# Patient Record
Sex: MALE | Race: WHITE | Employment: OTHER | URBAN - METROPOLITAN AREA
[De-identification: names, ages, dates, MRNs, and addresses within clinical notes are randomized per-mention and may not be internally consistent; named-entity substitution may affect disease eponyms.]

---

## 2018-07-27 ENCOUNTER — HOSPITAL ENCOUNTER (EMERGENCY)
Age: 63
Discharge: HOME | End: 2018-07-27
Attending: EMERGENCY MEDICINE | Admitting: EMERGENCY MEDICINE
Payer: SELF-PAY

## 2018-07-27 ENCOUNTER — APPOINTMENT (OUTPATIENT)
Dept: GENERAL RADIOLOGY | Age: 63
End: 2018-07-27
Attending: EMERGENCY MEDICINE
Payer: SELF-PAY

## 2018-07-27 VITALS
DIASTOLIC BLOOD PRESSURE: 71 MMHG | OXYGEN SATURATION: 98 % | WEIGHT: 179 LBS | HEIGHT: 69 IN | BODY MASS INDEX: 26.51 KG/M2 | SYSTOLIC BLOOD PRESSURE: 126 MMHG | HEART RATE: 95 BPM | RESPIRATION RATE: 17 BRPM | TEMPERATURE: 98.6 F

## 2018-07-27 DIAGNOSIS — L03.115 CELLULITIS OF RIGHT LOWER EXTREMITY: Primary | ICD-10-CM

## 2018-07-27 DIAGNOSIS — L02.91 ABSCESS: ICD-10-CM

## 2018-07-27 LAB
ANION GAP SERPL CALC-SCNC: 9 MMOL/L (ref 3–18)
BASOPHILS # BLD: 0 K/UL (ref 0–0.1)
BASOPHILS NFR BLD: 0 % (ref 0–2)
BUN SERPL-MCNC: 16 MG/DL (ref 7–18)
BUN/CREAT SERPL: 16 (ref 12–20)
CALCIUM SERPL-MCNC: 8.9 MG/DL (ref 8.5–10.1)
CHLORIDE SERPL-SCNC: 106 MMOL/L (ref 100–108)
CO2 SERPL-SCNC: 25 MMOL/L (ref 21–32)
CREAT SERPL-MCNC: 1.02 MG/DL (ref 0.6–1.3)
DIFFERENTIAL METHOD BLD: ABNORMAL
EOSINOPHIL # BLD: 0 K/UL (ref 0–0.4)
EOSINOPHIL NFR BLD: 0 % (ref 0–5)
ERYTHROCYTE [DISTWIDTH] IN BLOOD BY AUTOMATED COUNT: 13.3 % (ref 11.6–14.5)
GLUCOSE SERPL-MCNC: 109 MG/DL (ref 74–99)
HCT VFR BLD AUTO: 39.5 % (ref 36–48)
HGB BLD-MCNC: 13.4 G/DL (ref 13–16)
LACTATE BLD-SCNC: 0.4 MMOL/L (ref 0.4–2)
LYMPHOCYTES # BLD: 1 K/UL (ref 0.9–3.6)
LYMPHOCYTES NFR BLD: 6 % (ref 21–52)
MCH RBC QN AUTO: 31.2 PG (ref 24–34)
MCHC RBC AUTO-ENTMCNC: 33.9 G/DL (ref 31–37)
MCV RBC AUTO: 92.1 FL (ref 74–97)
MONOCYTES # BLD: 1.3 K/UL (ref 0.05–1.2)
MONOCYTES NFR BLD: 8 % (ref 3–10)
NEUTS SEG # BLD: 13.6 K/UL (ref 1.8–8)
NEUTS SEG NFR BLD: 86 % (ref 40–73)
PLATELET # BLD AUTO: 240 K/UL (ref 135–420)
PMV BLD AUTO: 8.8 FL (ref 9.2–11.8)
POTASSIUM SERPL-SCNC: 3.9 MMOL/L (ref 3.5–5.5)
RBC # BLD AUTO: 4.29 M/UL (ref 4.7–5.5)
SODIUM SERPL-SCNC: 140 MMOL/L (ref 136–145)
WBC # BLD AUTO: 15.9 K/UL (ref 4.6–13.2)

## 2018-07-27 PROCEDURE — 90471 IMMUNIZATION ADMIN: CPT

## 2018-07-27 PROCEDURE — 74011250636 HC RX REV CODE- 250/636: Performed by: EMERGENCY MEDICINE

## 2018-07-27 PROCEDURE — 80048 BASIC METABOLIC PNL TOTAL CA: CPT | Performed by: PHYSICIAN ASSISTANT

## 2018-07-27 PROCEDURE — 96374 THER/PROPH/DIAG INJ IV PUSH: CPT

## 2018-07-27 PROCEDURE — 73564 X-RAY EXAM KNEE 4 OR MORE: CPT

## 2018-07-27 PROCEDURE — 85025 COMPLETE CBC W/AUTO DIFF WBC: CPT | Performed by: PHYSICIAN ASSISTANT

## 2018-07-27 PROCEDURE — 74011250636 HC RX REV CODE- 250/636: Performed by: PHYSICIAN ASSISTANT

## 2018-07-27 PROCEDURE — 75810000289 HC I&D ABSCESS SIMP/COMP/MULT

## 2018-07-27 PROCEDURE — 99283 EMERGENCY DEPT VISIT LOW MDM: CPT

## 2018-07-27 PROCEDURE — 90715 TDAP VACCINE 7 YRS/> IM: CPT | Performed by: PHYSICIAN ASSISTANT

## 2018-07-27 PROCEDURE — 83605 ASSAY OF LACTIC ACID: CPT

## 2018-07-27 RX ORDER — SULFAMETHOXAZOLE AND TRIMETHOPRIM 800; 160 MG/1; MG/1
1 TABLET ORAL 2 TIMES DAILY
Qty: 14 TAB | Refills: 0 | Status: SHIPPED | OUTPATIENT
Start: 2018-07-27 | End: 2018-08-06

## 2018-07-27 RX ORDER — CEPHALEXIN 500 MG/1
500 CAPSULE ORAL 4 TIMES DAILY
Qty: 28 CAP | Refills: 0 | Status: ON HOLD | OUTPATIENT
Start: 2018-07-27 | End: 2018-08-06

## 2018-07-27 RX ORDER — CEPHALEXIN 250 MG/1
500 CAPSULE ORAL
Status: DISCONTINUED | OUTPATIENT
Start: 2018-07-27 | End: 2018-07-27 | Stop reason: HOSPADM

## 2018-07-27 RX ORDER — SULFAMETHOXAZOLE AND TRIMETHOPRIM 800; 160 MG/1; MG/1
1 TABLET ORAL
Status: DISCONTINUED | OUTPATIENT
Start: 2018-07-27 | End: 2018-07-27 | Stop reason: HOSPADM

## 2018-07-27 RX ORDER — KETOROLAC TROMETHAMINE 30 MG/ML
15 INJECTION, SOLUTION INTRAMUSCULAR; INTRAVENOUS
Status: COMPLETED | OUTPATIENT
Start: 2018-07-27 | End: 2018-07-27

## 2018-07-27 RX ADMIN — KETOROLAC TROMETHAMINE 15 MG: 30 INJECTION, SOLUTION INTRAMUSCULAR at 19:16

## 2018-07-27 RX ADMIN — TETANUS TOXOID, REDUCED DIPHTHERIA TOXOID AND ACELLULAR PERTUSSIS VACCINE, ADSORBED 0.5 ML: 5; 2.5; 8; 8; 2.5 SUSPENSION INTRAMUSCULAR at 19:15

## 2018-07-27 NOTE — ED NOTES
Cellulitis right knee. HR slightly elevated. I performed a brief evaluation, including history and physical, of the patient here in triage and I have determined that pt will need further treatment and evaluation from the main side ER physician. I have placed initial orders to help in expediting patients care.      July 27, 2018 at 5:44 PM - Yumiko Verma PA-C

## 2018-07-27 NOTE — ED PROVIDER NOTES
EMERGENCY DEPARTMENT HISTORY AND PHYSICAL EXAM    7:01 PM      Date: 7/27/2018  Patient Name: Chance Perdue    History of Presenting Illness     Chief Complaint   Patient presents with    Knee Pain         History Provided By: Patient    Chief Complaint: Knee pain  Duration: 2-3 Days  Timing:  Progressive  Location: Right knee  Quality: Pressure  Severity: Moderate  Modifying Factors: N/A  Associated Symptoms: Swelling, erythema, abrasion, nausea, drainage      Additional History (Context): Chance Perdue is a 65 y.o. male with no PMHx who presents with progressive, moderate right knee pain described as pressure with associated swelling, erythema, abrasion, nausea, and drainage onset x 2-3 days. Pt states had a rusted nail impact his right knee a few days ago and believes all of the nail was removed. Since the nail entered his knee he has begun to have swelling of the right knee and right foot. This morning around 4 AM he pressed down on the knee when drainage came out. Has a known allergy to penicillin and amoxicillin. Denies fever. No further concerns or complaints at this time. PCP: None        Past History     Past Medical History:  History reviewed. No pertinent past medical history. Past Surgical History:  History reviewed. No pertinent surgical history. Family History:  History reviewed. No pertinent family history. Social History:  Social History   Substance Use Topics    Smoking status: Current Some Day Smoker    Smokeless tobacco: Never Used    Alcohol use None       Allergies: Allergies   Allergen Reactions    Amoxicillin Hives    Other Medication Other (comments)     No narcotics/hx addiction    Penicillins Hives         Review of Systems       Review of Systems   Constitutional: Negative for fever. Gastrointestinal: Positive for nausea. Musculoskeletal: Positive for joint swelling (right knee and right foot) and myalgias (right knee).    Skin: Positive for color change (erythema of the right knee) and wound (abrasion). Positive for drainage. All other systems reviewed and are negative. Physical Exam     Visit Vitals    /73 (BP 1 Location: Right arm, BP Patient Position: At rest)    Pulse 95    Temp 98.6 °F (37 °C)    Resp 17    Ht 5' 9\" (1.753 m)    Wt 81.2 kg (179 lb)    SpO2 98%    BMI 26.43 kg/m2       Physical Exam   Constitutional: He is oriented to person, place, and time. He appears well-developed and well-nourished. HENT:   Head: Normocephalic and atraumatic. Neck: Neck supple. No JVD present. Abdominal: Soft. He exhibits no distension. There is no tenderness. There is no rebound and no guarding. Musculoskeletal:        Legs:  RLE 2+ edema  No LLE edema  Mild erythema R knee inferior to patella as drawn  Central area with white and no active drainage at this time  Full ROM R knee  Gross sensation intact BL LE  DP 2+    Neurological: He is alert and oriented to person, place, and time. Skin: Skin is warm and dry. Psychiatric: Judgment normal.         Diagnostic Study Results     Labs -  Recent Results (from the past 12 hour(s))   CBC WITH AUTOMATED DIFF    Collection Time: 07/27/18  7:10 PM   Result Value Ref Range    WBC 15.9 (H) 4.6 - 13.2 K/uL    RBC 4.29 (L) 4.70 - 5.50 M/uL    HGB 13.4 13.0 - 16.0 g/dL    HCT 39.5 36.0 - 48.0 %    MCV 92.1 74.0 - 97.0 FL    MCH 31.2 24.0 - 34.0 PG    MCHC 33.9 31.0 - 37.0 g/dL    RDW 13.3 11.6 - 14.5 %    PLATELET 530 338 - 007 K/uL    MPV 8.8 (L) 9.2 - 11.8 FL    NEUTROPHILS 86 (H) 40 - 73 %    LYMPHOCYTES 6 (L) 21 - 52 %    MONOCYTES 8 3 - 10 %    EOSINOPHILS 0 0 - 5 %    BASOPHILS 0 0 - 2 %    ABS. NEUTROPHILS 13.6 (H) 1.8 - 8.0 K/UL    ABS. LYMPHOCYTES 1.0 0.9 - 3.6 K/UL    ABS. MONOCYTES 1.3 (H) 0.05 - 1.2 K/UL    ABS. EOSINOPHILS 0.0 0.0 - 0.4 K/UL    ABS.  BASOPHILS 0.0 0.0 - 0.1 K/UL    DF AUTOMATED     METABOLIC PANEL, BASIC    Collection Time: 07/27/18  7:10 PM   Result Value Ref Range    Sodium 140 136 - 145 mmol/L    Potassium 3.9 3.5 - 5.5 mmol/L    Chloride 106 100 - 108 mmol/L    CO2 25 21 - 32 mmol/L    Anion gap 9 3.0 - 18 mmol/L    Glucose 109 (H) 74 - 99 mg/dL    BUN 16 7.0 - 18 MG/DL    Creatinine 1.02 0.6 - 1.3 MG/DL    BUN/Creatinine ratio 16 12 - 20      GFR est AA >60 >60 ml/min/1.73m2    GFR est non-AA >60 >60 ml/min/1.73m2    Calcium 8.9 8.5 - 10.1 MG/DL   POC LACTIC ACID    Collection Time: 07/27/18  7:18 PM   Result Value Ref Range    Lactic Acid (POC) 0.4 0.4 - 2.0 mmol/L       Radiologic Studies -   XR KNEE RT MIN 4 V    (Results Pending)     No acute process, mild joint effusion    Medical Decision Making   I am the first provider for this patient. I reviewed the vital signs, available nursing notes, past medical history, past surgical history, family history and social history. Vital Signs-Reviewed the patient's vital signs. Pulse Oximetry Analysis -  98% on room air, normal.       Records Reviewed: Nursing Notes (Time of Review: 7:01 PM)    ED Course: Progress Notes, Reevaluation, and Consults:    Provider Notes (Medical Decision Making): 64 y/o male presents with R knee redness and swelling. S/p nail to knee, concern for cellulitis, abscess  Doubt septic joint as ROM intact and pucture site inferior to joint  Will xr to eval for fb  Screen for sepsis. Discussed concern with pt and advised admission, pt declines, understands risk of worsening infection. bedisde US showing possible small fluid collection so will attempt I&D  Cover with abx. Discussed wound care and return precautions.      Procedures:   I&D Abcess Simple  Date/Time: 7/27/2018 8:15 PM  Performed by: Stalin Lora  Authorized by: Stalin Lora     Consent:     Consent obtained:  Verbal    Consent given by:  Patient    Risks discussed:  Bleeding, incomplete drainage and infection    Alternatives discussed:  No treatment  Location:     Type:  Abscess    Size:  1cm    Location:  Lower extremity    Lower extremity location:  Knee    Knee location:  R knee  Pre-procedure details:     Skin preparation:  Chloraprep  Anesthesia (see MAR for exact dosages): Anesthesia method:  Local infiltration    Local anesthetic:  Lidocaine 1% w/o epi (2)  Procedure type:     Complexity:  Simple  Procedure details:     Incision types:  Stab incision    Incision depth:  Subcutaneous    Scalpel blade:  11    Drainage:  Bloody    Drainage amount:  Scant    Wound treatment:  Wound left open    Packing materials:  None  Post-procedure details:     Patient tolerance of procedure: Tolerated well, no immediate complications            Diagnosis     Clinical Impression:   1. Cellulitis of right lower extremity    2. Abscess        Disposition: Discharged. Attestations:  Romel [address] [place] [address] acting as a scribe for and in the presence of Starla Dutta DO      July 27, 2018 at 7:01 PM       Provider Attestation:      I personally performed the services described in the documentation, reviewed the documentation, as recorded by the scribe in my presence, and it accurately and completely records my words and actions.  July 27, 2018 at DO [place]    _______________________________

## 2018-07-28 NOTE — DISCHARGE INSTRUCTIONS
Cellulitis: Care Instructions  Your Care Instructions    Cellulitis is a skin infection caused by bacteria, most often strep or staph. It often occurs after a break in the skin from a scrape, cut, bite, or puncture, or after a rash. Cellulitis may be treated without doing tests to find out what caused it. But your doctor may do tests, if needed, to look for a specific bacteria, like methicillin-resistant Staphylococcus aureus (MRSA). The doctor has checked you carefully, but problems can develop later. If you notice any problems or new symptoms, get medical treatment right away. Follow-up care is a key part of your treatment and safety. Be sure to make and go to all appointments, and call your doctor if you are having problems. It's also a good idea to know your test results and keep a list of the medicines you take. How can you care for yourself at home? · Take your antibiotics as directed. Do not stop taking them just because you feel better. You need to take the full course of antibiotics. · Prop up the infected area on pillows to reduce pain and swelling. Try to keep the area above the level of your heart as often as you can. · If your doctor told you how to care for your wound, follow your doctor's instructions. If you did not get instructions, follow this general advice:  ¨ Wash the wound with clean water 2 times a day. Don't use hydrogen peroxide or alcohol, which can slow healing. ¨ You may cover the wound with a thin layer of petroleum jelly, such as Vaseline, and a nonstick bandage. ¨ Apply more petroleum jelly and replace the bandage as needed. · Be safe with medicines. Take pain medicines exactly as directed. ¨ If the doctor gave you a prescription medicine for pain, take it as prescribed. ¨ If you are not taking a prescription pain medicine, ask your doctor if you can take an over-the-counter medicine.   To prevent cellulitis in the future  · Try to prevent cuts, scrapes, or other injuries to your skin. Cellulitis most often occurs where there is a break in the skin. · If you get a scrape, cut, mild burn, or bite, wash the wound with clean water as soon as you can to help avoid infection. Don't use hydrogen peroxide or alcohol, which can slow healing. · If you have swelling in your legs (edema), support stockings and good skin care may help prevent leg sores and cellulitis. · Take care of your feet, especially if you have diabetes or other conditions that increase the risk of infection. Wear shoes and socks. Do not go barefoot. If you have athlete's foot or other skin problems on your feet, talk to your doctor about how to treat them. When should you call for help? Call your doctor now or seek immediate medical care if:    · You have signs that your infection is getting worse, such as:  ¨ Increased pain, swelling, warmth, or redness. ¨ Red streaks leading from the area. ¨ Pus draining from the area. ¨ A fever.     · You get a rash.    Watch closely for changes in your health, and be sure to contact your doctor if:    · You do not get better as expected. Where can you learn more? Go to http://tiffanie-elliot.info/. Lee Lacey in the search box to learn more about \"Cellulitis: Care Instructions. \"  Current as of: May 10, 2017  Content Version: 11.7  © 7454-7971 [employer]. Care instructions adapted under license by [employer] (which disclaims liability or warranty for this information). If you have questions about a medical condition or this instruction, always ask your healthcare professional. [place] [address] any warranty or liability for your use of this information.

## 2018-07-29 ENCOUNTER — HOSPITAL ENCOUNTER (INPATIENT)
Age: 63
LOS: 8 days | Discharge: HOME HEALTH | DRG: 464 | End: 2018-08-06
Attending: EMERGENCY MEDICINE | Admitting: INTERNAL MEDICINE
Payer: SELF-PAY

## 2018-07-29 DIAGNOSIS — L03.115 CELLULITIS OF RIGHT LEG: Primary | ICD-10-CM

## 2018-07-29 DIAGNOSIS — Z78.9 FAILURE OF OUTPATIENT TREATMENT: ICD-10-CM

## 2018-07-29 PROBLEM — T14.8XXA INFECTED WOUND: Status: ACTIVE | Noted: 2018-07-29

## 2018-07-29 PROBLEM — L08.9 INFECTED WOUND: Status: ACTIVE | Noted: 2018-07-29

## 2018-07-29 PROBLEM — Z72.0 TOBACCO ABUSE: Status: ACTIVE | Noted: 2018-07-29

## 2018-07-29 LAB
ANION GAP SERPL CALC-SCNC: 7 MMOL/L (ref 3–18)
ANION GAP SERPL CALC-SCNC: 9 MMOL/L (ref 3–18)
BASOPHILS # BLD: 0 K/UL (ref 0–0.1)
BASOPHILS # BLD: 0 K/UL (ref 0–0.1)
BASOPHILS NFR BLD: 0 % (ref 0–2)
BASOPHILS NFR BLD: 0 % (ref 0–2)
BUN SERPL-MCNC: 15 MG/DL (ref 7–18)
BUN SERPL-MCNC: 16 MG/DL (ref 7–18)
BUN/CREAT SERPL: 14 (ref 12–20)
BUN/CREAT SERPL: 14 (ref 12–20)
CALCIUM SERPL-MCNC: 8.1 MG/DL (ref 8.5–10.1)
CALCIUM SERPL-MCNC: 8.4 MG/DL (ref 8.5–10.1)
CHLORIDE SERPL-SCNC: 105 MMOL/L (ref 100–108)
CHLORIDE SERPL-SCNC: 105 MMOL/L (ref 100–108)
CO2 SERPL-SCNC: 25 MMOL/L (ref 21–32)
CO2 SERPL-SCNC: 28 MMOL/L (ref 21–32)
CREAT SERPL-MCNC: 1.07 MG/DL (ref 0.6–1.3)
CREAT SERPL-MCNC: 1.14 MG/DL (ref 0.6–1.3)
DIFFERENTIAL METHOD BLD: ABNORMAL
DIFFERENTIAL METHOD BLD: ABNORMAL
EOSINOPHIL # BLD: 0 K/UL (ref 0–0.4)
EOSINOPHIL # BLD: 0.1 K/UL (ref 0–0.4)
EOSINOPHIL NFR BLD: 0 % (ref 0–5)
EOSINOPHIL NFR BLD: 1 % (ref 0–5)
ERYTHROCYTE [DISTWIDTH] IN BLOOD BY AUTOMATED COUNT: 13.3 % (ref 11.6–14.5)
ERYTHROCYTE [DISTWIDTH] IN BLOOD BY AUTOMATED COUNT: 13.5 % (ref 11.6–14.5)
GLUCOSE SERPL-MCNC: 114 MG/DL (ref 74–99)
GLUCOSE SERPL-MCNC: 97 MG/DL (ref 74–99)
HCT VFR BLD AUTO: 35.9 % (ref 36–48)
HCT VFR BLD AUTO: 38.5 % (ref 36–48)
HGB BLD-MCNC: 12.1 G/DL (ref 13–16)
HGB BLD-MCNC: 13.2 G/DL (ref 13–16)
LACTATE BLD-SCNC: 0.9 MMOL/L (ref 0.4–2)
LACTATE BLD-SCNC: 1.2 MMOL/L (ref 0.4–2)
LACTATE SERPL-SCNC: 1.1 MMOL/L (ref 0.4–2)
LYMPHOCYTES # BLD: 0.8 K/UL (ref 0.9–3.6)
LYMPHOCYTES # BLD: 0.9 K/UL (ref 0.9–3.6)
LYMPHOCYTES NFR BLD: 7 % (ref 21–52)
LYMPHOCYTES NFR BLD: 7 % (ref 21–52)
MCH RBC QN AUTO: 31.3 PG (ref 24–34)
MCH RBC QN AUTO: 31.4 PG (ref 24–34)
MCHC RBC AUTO-ENTMCNC: 33.7 G/DL (ref 31–37)
MCHC RBC AUTO-ENTMCNC: 34.3 G/DL (ref 31–37)
MCV RBC AUTO: 91.4 FL (ref 74–97)
MCV RBC AUTO: 92.8 FL (ref 74–97)
MONOCYTES # BLD: 0.8 K/UL (ref 0.05–1.2)
MONOCYTES # BLD: 1 K/UL (ref 0.05–1.2)
MONOCYTES NFR BLD: 10 % (ref 3–10)
MONOCYTES NFR BLD: 6 % (ref 3–10)
NEUTS SEG # BLD: 11.1 K/UL (ref 1.8–8)
NEUTS SEG # BLD: 8.6 K/UL (ref 1.8–8)
NEUTS SEG NFR BLD: 82 % (ref 40–73)
NEUTS SEG NFR BLD: 87 % (ref 40–73)
PLATELET # BLD AUTO: 204 K/UL (ref 135–420)
PLATELET # BLD AUTO: 229 K/UL (ref 135–420)
PMV BLD AUTO: 8.8 FL (ref 9.2–11.8)
PMV BLD AUTO: 8.8 FL (ref 9.2–11.8)
POTASSIUM SERPL-SCNC: 3.8 MMOL/L (ref 3.5–5.5)
POTASSIUM SERPL-SCNC: 3.9 MMOL/L (ref 3.5–5.5)
RBC # BLD AUTO: 3.87 M/UL (ref 4.7–5.5)
RBC # BLD AUTO: 4.21 M/UL (ref 4.7–5.5)
SODIUM SERPL-SCNC: 139 MMOL/L (ref 136–145)
SODIUM SERPL-SCNC: 140 MMOL/L (ref 136–145)
WBC # BLD AUTO: 10.5 K/UL (ref 4.6–13.2)
WBC # BLD AUTO: 12.8 K/UL (ref 4.6–13.2)

## 2018-07-29 PROCEDURE — 99284 EMERGENCY DEPT VISIT MOD MDM: CPT

## 2018-07-29 PROCEDURE — 80048 BASIC METABOLIC PNL TOTAL CA: CPT | Performed by: PHYSICIAN ASSISTANT

## 2018-07-29 PROCEDURE — 74011250637 HC RX REV CODE- 250/637: Performed by: INTERNAL MEDICINE

## 2018-07-29 PROCEDURE — 74011250636 HC RX REV CODE- 250/636: Performed by: INTERNAL MEDICINE

## 2018-07-29 PROCEDURE — 85025 COMPLETE CBC W/AUTO DIFF WBC: CPT | Performed by: PHYSICIAN ASSISTANT

## 2018-07-29 PROCEDURE — 87070 CULTURE OTHR SPECIMN AEROBIC: CPT | Performed by: INTERNAL MEDICINE

## 2018-07-29 PROCEDURE — 77030011256 HC DRSG MEPILEX <16IN NO BORD MOLN -A

## 2018-07-29 PROCEDURE — 96365 THER/PROPH/DIAG IV INF INIT: CPT

## 2018-07-29 PROCEDURE — 83605 ASSAY OF LACTIC ACID: CPT

## 2018-07-29 PROCEDURE — 74011250636 HC RX REV CODE- 250/636: Performed by: EMERGENCY MEDICINE

## 2018-07-29 PROCEDURE — 77030019604 HC DRSG WND CA ALG S&N -A

## 2018-07-29 PROCEDURE — 65270000029 HC RM PRIVATE

## 2018-07-29 PROCEDURE — 87040 BLOOD CULTURE FOR BACTERIA: CPT | Performed by: HOSPITALIST

## 2018-07-29 PROCEDURE — 36415 COLL VENOUS BLD VENIPUNCTURE: CPT | Performed by: INTERNAL MEDICINE

## 2018-07-29 PROCEDURE — 80048 BASIC METABOLIC PNL TOTAL CA: CPT | Performed by: INTERNAL MEDICINE

## 2018-07-29 PROCEDURE — 87186 SC STD MICRODIL/AGAR DIL: CPT | Performed by: INTERNAL MEDICINE

## 2018-07-29 PROCEDURE — 87077 CULTURE AEROBIC IDENTIFY: CPT | Performed by: INTERNAL MEDICINE

## 2018-07-29 PROCEDURE — 83605 ASSAY OF LACTIC ACID: CPT | Performed by: INTERNAL MEDICINE

## 2018-07-29 RX ORDER — LEVOFLOXACIN 5 MG/ML
750 INJECTION, SOLUTION INTRAVENOUS EVERY 24 HOURS
Status: DISCONTINUED | OUTPATIENT
Start: 2018-07-29 | End: 2018-07-31

## 2018-07-29 RX ORDER — VANCOMYCIN 2 GRAM/500 ML IN 0.9 % SODIUM CHLORIDE INTRAVENOUS
2000 ONCE
Status: COMPLETED | OUTPATIENT
Start: 2018-07-29 | End: 2018-07-29

## 2018-07-29 RX ORDER — ACETAMINOPHEN 325 MG/1
650 TABLET ORAL
Status: DISCONTINUED | OUTPATIENT
Start: 2018-07-29 | End: 2018-07-30

## 2018-07-29 RX ORDER — METRONIDAZOLE 500 MG/100ML
500 INJECTION, SOLUTION INTRAVENOUS EVERY 8 HOURS
Status: DISCONTINUED | OUTPATIENT
Start: 2018-07-29 | End: 2018-07-29

## 2018-07-29 RX ORDER — SODIUM CHLORIDE 9 MG/ML
75 INJECTION, SOLUTION INTRAVENOUS CONTINUOUS
Status: DISCONTINUED | OUTPATIENT
Start: 2018-07-29 | End: 2018-07-31

## 2018-07-29 RX ORDER — SODIUM CHLORIDE 0.9 % (FLUSH) 0.9 %
5-10 SYRINGE (ML) INJECTION AS NEEDED
Status: DISCONTINUED | OUTPATIENT
Start: 2018-07-29 | End: 2018-08-06 | Stop reason: HOSPADM

## 2018-07-29 RX ORDER — ENOXAPARIN SODIUM 100 MG/ML
40 INJECTION SUBCUTANEOUS EVERY 24 HOURS
Status: DISCONTINUED | OUTPATIENT
Start: 2018-07-29 | End: 2018-08-06 | Stop reason: HOSPADM

## 2018-07-29 RX ORDER — SODIUM CHLORIDE 0.9 % (FLUSH) 0.9 %
5-10 SYRINGE (ML) INJECTION EVERY 8 HOURS
Status: DISCONTINUED | OUTPATIENT
Start: 2018-07-29 | End: 2018-08-06 | Stop reason: HOSPADM

## 2018-07-29 RX ADMIN — SODIUM CHLORIDE 75 ML/HR: 900 INJECTION, SOLUTION INTRAVENOUS at 19:23

## 2018-07-29 RX ADMIN — VANCOMYCIN HYDROCHLORIDE 2000 MG: 10 INJECTION, POWDER, LYOPHILIZED, FOR SOLUTION INTRAVENOUS at 16:56

## 2018-07-29 RX ADMIN — ENOXAPARIN SODIUM 40 MG: 100 INJECTION SUBCUTANEOUS at 19:23

## 2018-07-29 RX ADMIN — ACETAMINOPHEN 650 MG: 325 TABLET, FILM COATED ORAL at 19:22

## 2018-07-29 RX ADMIN — LEVOFLOXACIN 750 MG: 5 INJECTION, SOLUTION INTRAVENOUS at 15:21

## 2018-07-29 NOTE — IP AVS SNAPSHOT
[address] Patient: Papito Shah MRN: [id number] KQS:5/25/7805 About your hospitalization You were admitted on:  July 29, 2018 You last received care in the:  [address] [place] [address],[address] You were discharged on:  August 6, 2018 Why you were hospitalized Your primary diagnosis was:  Cellulitis Of Right Leg Your diagnoses also included:  Infected Wound, Tobacco Abuse Follow-up Information Follow up With Details Comments Contact Brandie Miller MD On 8/6/2018 11:45am [address] [phone] 
[phone] Your Scheduled Appointments Tuesday August 07, 2018 To Be Determined START OF CARE with Magdiel Lugo RN  
[facility] CARE SCHEDULING/INTAKE ([facility]/ [facility]PICE) [address] [place] [address] SCHEDULING/INTAKE ([facility]/ HOSPICE) Tuesday August 07, 2018  3:00 PM EDT INFUSION 30 with [facility] INFUSION NURSE 3  
[facility] OP INFUSION [facility] ([address]) [facility] [phone]  
[phone] Wednesday August 08, 2018  1:00 PM EDT INFUSION 30 with [facility] FAST TRACK NURSE [facility] OP INFUSION [facility] ([address]) [facility] [phone]  
[phone] Discharge Orders None A check val indicates which time of day the medication should be taken. My Medications START taking these medications Instructions Each Dose to Equal  
 Morning Noon Evening Bedtime  
 oxyCODONE-acetaminophen  mg per tablet Commonly known as:  PERCOCET 10 Your last dose was: Your next dose is: Take 2 Tabs by mouth every six (6) hours as needed. Max Daily Amount: 8 Tabs. 2 Tab CONTINUE taking these medications  Instructions Each Dose to Equal  
 Morning Noon Evening Bedtime  
 cephALEXin 500 mg capsule Commonly known as:  Christi Hall Your last dose was: Your next dose is: Take 1 Cap by mouth four (4) times daily for 9 days. 500 mg  
    
   
   
   
  
  
STOP taking these medications   
 trimethoprim-sulfamethoxazole 160-800 mg per tablet Commonly known as:  BACTRIM DS, SEPTRA DS Where to Get Your Medications Information on where to get these meds will be given to you by the nurse or doctor. ! Ask your nurse or doctor about these medications  
  cephALEXin 500 mg capsule  
 oxyCODONE-acetaminophen  mg per tablet Opioid Education Prescription Opioids: What You Need to Know: 
 
 
After general anesthesia or intravenous sedation, for 24 hours or while taking prescription Narcotics: · Limit your activities · Do not drive and operate hazardous machinery · Do not make important personal or business decisions · Do  not drink alcoholic beverages · If you have not urinated within 8 hours after discharge, please contact your surgeon on call. Report the following to your surgeon: 
· Excessive pain, swelling, redness or odor of or around the surgical area · Temperature over 100.5 · Nausea and vomiting lasting longer than 4 hours or if unable to take medications · Any signs of decreased circulation or nerve impairment to extremity: change in color, persistent  numbness, tingling, coldness or increase pain · Any questions What to do at Home: 
Recommended activity: Activity as tolerated,  
 
 *  Please give a list of your current medications to your Primary Care Provider. *  Please update this list whenever your medications are discontinued, doses are 
    changed, or new medications (including over-the-counter products) are added. *  Please carry medication information at all times in case of emergency situations. These are general instructions for a healthy lifestyle: No smoking/ No tobacco products/ Avoid exposure to second hand smoke Surgeon General's Warning:  Quitting smoking now greatly reduces serious risk to your health. Obesity, smoking, and sedentary lifestyle greatly increases your risk for illness A healthy diet, regular physical exercise & weight monitoring are important for maintaining a healthy lifestyle You may be retaining fluid if you have a history of heart failure or if you experience any of the following symptoms:  Weight gain of 3 pounds or more overnight or 5 pounds in a week, increased swelling in our hands or feet or shortness of breath while lying flat in bed. Please call your doctor as soon as you notice any of these symptoms; do not wait until your next office visit. Recognize signs and symptoms of STROKE: 
 
F-face looks uneven A-arms unable to move or move unevenly S-speech slurred or non-existent T-time-call 911 as soon as signs and symptoms begin-DO NOT go Back to bed or wait to see if you get better-TIME IS BRAIN. Warning Signs of HEART ATTACK Call 911 if you have these symptoms: 
? Chest discomfort. Most heart attacks involve discomfort in the center of the chest that lasts more than a few minutes, or that goes away and comes back. It can feel like uncomfortable pressure, squeezing, fullness, or pain. ? Discomfort in other areas of the upper body. Symptoms can include pain or discomfort in one or both arms, the back, neck, jaw, or stomach. ? Shortness of breath with or without chest discomfort. ? Other signs may include breaking out in a cold sweat, nausea, or lightheadedness. Don't wait more than five minutes to call [address]! Fast action can save your life. Calling 911 is almost always the fastest way to get lifesaving treatment. Emergency Medical Services staff can begin treatment when they arrive  up to an hour sooner than if someone gets to the hospital by car. The discharge information has been reviewed with the patient. The patient verbalized understanding. Discharge medications reviewed with the patient and appropriate educational materials and side effects teaching were provided. ___________________________________________________________________________________________________________________________________ [employer] Coach[employer] Announcement We are excited to announce that we are making your provider's discharge notes available to you in Printed Piece. You will see these notes when they are completed and signed by the physician that discharged you from your recent hospital stay. If you have any questions or concerns about any information you see in Printed Piece, please call the Health Information Department where you were seen or reach out to your Primary Care Provider for more information about your plan of care. Introducing [facility]! [employer] introduces Printed Piece patient portal. Now you can access parts of your medical record, email your doctor's office, and request medication refills online. 1. In your internet browser, go to [email or web address] 2. Click on the First Time User? Click Here link in the Sign In box. You will see the New Member Sign Up page. 3. Enter your Printed Piece Access Code exactly as it appears below. You will not need to use this code after youve completed the sign-up process. If you do not sign up before the expiration date, you must request a new code. · Printed Piece Access Code: [email or web address] Expires: 10/25/2018  5:39 PM 
 
4. Enter the last four digits of your Social Security Number (xxxx) and Date of Birth (mm/dd/yyyy) as indicated and click Submit. You will be taken to the next sign-up page. 5. Create a Awesome.met ID. This will be your Service Seeking login ID and cannot be changed, so think of one that is secure and easy to remember. 6. Create a Service Seeking password. You can change your password at any time. 7. Enter your Password Reset Question and Answer. This can be used at a later time if you forget your password. 8. Enter your e-mail address. You will receive e-mail notification when new information is available in [address] 9. Click Sign Up. You can now view and download portions of your medical record. 10. Click the Download Summary menu link to download a portable copy of your medical information. If you have questions, please visit the Frequently Asked Questions section of the Service Seeking website. Remember, Service Seeking is NOT to be used for urgent needs. For medical emergencies, dial 911. Now available from your iPhone and Android! Introducing Anders Deshpande As a [employer] patient, I wanted to make you aware of our electronic visit tool called Anders Deshpande. [employer] 24/7 allows you to connect within minutes with a medical provider 24 hours a day, seven days a week via a mobile device or tablet or logging into a secure website from your computer. You can access Anders Anthonyprimofin from anywhere in the [place]. A virtual visit might be right for you when you have a simple condition and feel like you just dont want to get out of bed, or cant get away from work for an appointment, when your regular [employer] provider is not available (evenings, weekends or holidays), or when youre out of town and need minor care.   Electronic visits cost only $49 and if the Anders Charlee provider determines a prescription is needed to treat your condition, one can be electronically transmitted to a nearby pharmacy*. Please take a moment to enroll today if you have not already done so. The enrollment process is free and takes just a few minutes. To enroll, please download the Tittat 24/7 sandy to your tablet or phone, or visit [email or web address] to enroll on your computer. And, as an [address] [place] [address] patient with a Idibon account, the results of your visits will be scanned into your electronic medical record and your primary care provider will be able to view the scanned results. We urge you to continue to see your regular [employer] provider for your ongoing medical care. And while your primary care provider may not be the one available when you seek a [employer]â„¢ virtual visit, the peace of mind you get from getting a real diagnosis real time can be priceless. For more information on [employer]â„¢, view our Frequently Asked Questions (FAQs) at [email or web address]. Sincerely, 
 
Figueroa Reddy MD 
Chief Medical Officer [facility] Sera Bojorquez *:  certain medications cannot be prescribed via [employer]â„¢ Unresulted Labs-Please follow up with your PCP about these lab tests Order Current Status CK In process Providers Seen During Your Hospitalization Provider Specialty Primary office phone Nini Saha MD Emergency Medicine [phone] Nick Sanchez MD Internal Medicine [phone] Donnie Jones MD Family Practice [phone] Sosa Carl MD Internal Medicine [phone] Your Primary Care Physician (PCP) Primary Care Physician Office Phone Office Fax [phone] [address] [phone] You are allergic to the following Allergen Reactions Amoxicillin Hives Other Medication Other (comments) No narcotics/hx addiction Penicillins Hives Recent Documentation Height Weight BMI Smoking Status 1.753 m 81.2 kg 26.43 kg/m2 Current Some Day Smoker Emergency Contacts Name Discharge Info Relation Home Work Mobile Shashank Salmeron  Other Relative [6] [phone] Elizabeth Simon CAREGIVER [3] Friend [5] [phone] Patient Belongings The following personal items are in your possession at time of discharge: 
  Dental Appliances: None  Visual Aid: Glasses, With patient      Home Medications: None   Jewelry: None  Clothing: At bedside    Other Valuables: Cell Phone  Personal Items Sent to Safe: none Discharge Instructions Attachments/References CELLULITIS (ENGLISH) WOUND: VAC (VACUUM-ASSISTED CLOSURE) (ENGLISH) Patient Handouts Cellulitis: Care Instructions Your Care Instructions Cellulitis is a skin infection caused by bacteria, most often strep or staph. It often occurs after a break in the skin from a scrape, cut, bite, or puncture, or after a rash. Cellulitis may be treated without doing tests to find out what caused it. But your doctor may do tests, if needed, to look for a specific bacteria, like methicillin-resistant Staphylococcus aureus (MRSA). The doctor has checked you carefully, but problems can develop later. If you notice any problems or new symptoms, get medical treatment right away. Follow-up care is a key part of your treatment and safety. Be sure to make and go to all appointments, and call your doctor if you are having problems. It's also a good idea to know your test results and keep a list of the medicines you take. How can you care for yourself at home? · Take your antibiotics as directed. Do not stop taking them just because you feel better. You need to take the full course of antibiotics. · Prop up the infected area on pillows to reduce pain and swelling. Try to keep the area above the level of your heart as often as you can. · If your doctor told you how to care for your wound, follow your doctor's instructions. If you did not get instructions, follow this general advice: ¨ Wash the wound with clean water 2 times a day. Don't use hydrogen peroxide or alcohol, which can slow healing. ¨ You may cover the wound with a thin layer of petroleum jelly, such as Vaseline, and a nonstick bandage. ¨ Apply more petroleum jelly and replace the bandage as needed. · Be safe with medicines. Take pain medicines exactly as directed. ¨ If the doctor gave you a prescription medicine for pain, take it as prescribed. ¨ If you are not taking a prescription pain medicine, ask your doctor if you can take an over-the-counter medicine. To prevent cellulitis in the future · Try to prevent cuts, scrapes, or other injuries to your skin. Cellulitis most often occurs where there is a break in the skin. · If you get a scrape, cut, mild burn, or bite, wash the wound with clean water as soon as you can to help avoid infection. Don't use hydrogen peroxide or alcohol, which can slow healing. · If you have swelling in your legs (edema), support stockings and good skin care may help prevent leg sores and cellulitis. · Take care of your feet, especially if you have diabetes or other conditions that increase the risk of infection. Wear shoes and socks. Do not go barefoot. If you have athlete's foot or other skin problems on your feet, talk to your doctor about how to treat them. When should you call for help? Call your doctor now or seek immediate medical care if: 
  · You have signs that your infection is getting worse, such as: 
¨ Increased pain, swelling, warmth, or redness. ¨ Red streaks leading from the area. ¨ Pus draining from the area. ¨ A fever.  
  · You get a rash.  
 Watch closely for changes in your health, and be sure to contact your doctor if: 
  · You do not get better as expected. Where can you learn more? Go to http://tiffanie-elliot.info/. Zeeshan Hamilton in the search box to learn more about \"Cellulitis: Care Instructions. \" Current as of: May 10, 2017 Content Version: 11.7 © 7251-3308 [employer]. Care instructions adapted under license by [employer] (which disclaims liability or warranty for this information). If you have questions about a medical condition or this instruction, always ask your healthcare professional. [address] any warranty or liability for your use of this information. Vacuum-Assisted Closure for Wound Healing: Care Instructions Your Care Instructions When you have a wound that is hard to close your doctor may treat it with vacuum-assisted closure (VAC). VAC uses negative pressure (suction) to help bring the edges of your wound together. It also removes fluid and dead tissue from the wound area. In VAC: 
· A special piece of foam or cotton gauze fits over your wound. This covers and protects the wound. A clear bandage (film dressing) goes several inches beyond the foam or gauze dressing to create a seal for the vacuum. · A tube connects the foam to a small machine called the therapy unit. The therapy unit creates the suction. · The [facility] system may be carried around (portable) or may stay in one place (stationary). VAC does not hurt. You may feel a mild pulling on the wound when treatment first starts. How long you need VAC depends on the size and type of wound you have and how well the [facility] works. You will be limited in what you can do while the wound heals. You will use VAC 24 hours a day. Follow-up care is a key part of your treatment and safety. Be sure to make and go to all appointments, and call your doctor if you are having problems. It's also a good idea to know your test results and keep a list of the medicines you take. How can you care for yourself at home? · A home health care worker will come to your home a few times a week to change the bandage and check the machine. You may need it changed more often if there is a lot of drainage. · Your doctor will give you information on what you can and can't do. This depends on where your wound is located. Your activities may be limited during the time you're using VAC. · You will be able to take sponge baths. Don't shower or take baths unless your doctor says it is okay. · Take pain medicines exactly as directed. ¨ If the doctor gave you a prescription medicine for pain, take it as prescribed. ¨ If you are not taking a prescription pain medicine, ask your doctor if you can take an over-the-counter medicine. · If your doctor prescribed antibiotics, take them as directed. Do not stop taking them just because you feel better. You need to take the full course of antibiotics. When should you call for help? Call 911 anytime you think you may need emergency care. For example, call if: 
  · You have a lot of bleeding or see a sudden change in the color or texture of the drainage.  
  · The wound splits open and organs under the skin can be seen (evisceration).  
 Call your doctor now or seek immediate medical care if: 
  · The wound starts bleeding.  
  · The bandage comes off. Cover the area with a sterile bandage until you can see your doctor or your home health care worker comes by.  
  · You have signs of infection, such as: 
¨ Increased pain, swelling, warmth, or redness around the wound. ¨ Red streaks leading from the wound. ¨ Pus draining from the wound. ¨ A fever.  
 Watch closely for changes in your health, and be sure to contact your doctor if: 
  · The noise the machine makes changes or gets very loud. This may mean the seal is broken or the machine is not producing enough suction. Where can you learn more? Go to http://tiffanie-elliot.info/. Enter K124 in the search box to learn more about \"Vacuum-Assisted Closure for Wound Healing: Care Instructions. \" Current as of: November 21, 2017 Content Version: 11.7 © 8225-0907 [facility] [employer]. Care instructions adapted under license by [employer] (which disclaims liability or warranty for this information). If you have questions about a medical condition or this instruction, always ask your healthcare professional. [address] any warranty or liability for your use of this information. Please provide this summary of care documentation to your next provider. Signatures-by signing, you are acknowledging that this After Visit Summary has been reviewed with you and you have received a copy. Patient Signature:  ____________________________________________________________ Date:  ____________________________________________________________  
  
Chani Kasie Provider Signature:  ____________________________________________________________ Date:  ____________________________________________________________

## 2018-07-29 NOTE — ED NOTES
Cellulitis has worsened since Friday. On bactrim and keflex x 2 days. I performed a brief evaluation, including history and physical, of the patient here in triage and I have determined that pt will need further treatment and evaluation from the main side ER physician. I have placed initial orders to help in expediting patients care. July 29, 2018 at 1:37 PM - Kaylee Verma PA-C Visit Vitals  /74 (BP 1 Location: Right arm, BP Patient Position: At rest)  Pulse 80  Temp 98 °F (36.7 °C)  Resp 16  
 Ht 5' 9\" (1.753 m)  Wt 81.2 kg (179 lb)  SpO2 99%  BMI 26.43 kg/m2

## 2018-07-29 NOTE — PROGRESS NOTES
Pharmacy Dosing Services: Vancomycin    Indication: Diabetic Foot Infection    Day of therapy: 0    Other Antimicrobials (Include dose, start day & day of therapy):  Levofloxacin 750mg IV Q24H      Loading dose (date given): 2000mg  Current Maintenance dose: New Start    Goal Vancomycin Level: 15-20  (Trough 15-20 for most infections, 20 for meningitis/osteomyelitis, pre-HD level ~25)    Vancomycin Level (if drawn): New start     Significant Cultures: N/A    Renal function stable? (unstable defined as SCr increase of 0.5 mg/dL or > 50% increase from baseline, whichever is greater) (Y/N): Y     CAPD, Hemodialysis or Renal Replacement Therapy (Y/N): N     Recent Labs      18   1350  18   1910   CREA  1.14  1.02   BUN  16  16   WBC  12.8  15.9*     Temp (24hrs), Av °F (36.7 °C), Min:98 °F (36.7 °C), Max:98 °F (36.7 °C)    Creatinine Clearance (Creatinine Clearance (ml/min)): 68 mL/min     Regimen assessment: New Start  Maintenance dose: 1000mg IV Q12H  Next scheduled level:  @ 1630       Pharmacy will follow daily and adjust medications as appropriate for renal function and/or serum levels.     Thank you,  Leticia Green, PHARMD

## 2018-07-29 NOTE — PROGRESS NOTES
1900: Provided patient with boxed meal and hot tray. 1902: Spoke with Dr. Behzad Pierre, patient complaint of headache, order for Tylenol PRN. 1905: Called ultrasound, states tech for PVL not available Saturday/Sunday. Called nursing supervisor. Will attempt to page on-call tech. 1907: Paged on-call tech ,  states Peter Wilson, ultrasound tech will call to [phone]. Patient resting in bed, awaiting verification from pharmacy to administer Tylenol. 1912: Spoke with ultrasound tech, PVL will be preformed when tech arrives Monday morning. Bedside shift change report given to Brook Garza RN (oncoming nurse) by Anamika Guevara RN (offgoing nurse). Report included the following information SBAR, Kardex, Intake/Output, MAR, Accordion and Recent Results. 0139: Drainage oozing from wound to RLE, applied. No increase in edema. Erythema at level of knee, no change. Bedside shift change report given to Yin Johns RN (oncoming nurse) by Brook Garza RN (offgoing nurse). Report included the following information SBAR, Kardex, ED Summary, Procedure Summary, Intake/Output and MAR.

## 2018-07-29 NOTE — IP AVS SNAPSHOT
303 [facility] 
 
 
 [address] [facility] [place]io Richard [address] [facility] Patient: Melvern Cheadle MRN: [id number] ADELITA:0/98/7238 A check val indicates which time of day the medication should be taken. My Medications START taking these medications Instructions Each Dose to Equal  
 Morning Noon Evening Bedtime  
 oxyCODONE-acetaminophen  mg per tablet Commonly known as:  PERCOCET 10 Your last dose was: Your next dose is: Take 2 Tabs by mouth every six (6) hours as needed. Max Daily Amount: 8 Tabs. 2 Tab CONTINUE taking these medications Instructions Each Dose to Equal  
 Morning Noon Evening Bedtime  
 cephALEXin 500 mg capsule Commonly known as:  Isidro Hicks Your last dose was: Your next dose is: Take 1 Cap by mouth four (4) times daily for 9 days. 500 mg  
    
   
   
   
  
  
STOP taking these medications   
 trimethoprim-sulfamethoxazole 160-800 mg per tablet Commonly known as:  BACTRIM DS, SEPTRA DS Where to Get Your Medications Information on where to get these meds will be given to you by the nurse or doctor. ! Ask your nurse or doctor about these medications  
  cephALEXin 500 mg capsule  
 oxyCODONE-acetaminophen  mg per tablet

## 2018-07-29 NOTE — PROGRESS NOTES
1800  Pt assisted from zaid to the bed, bell with in reach,told primary nurse Sanjana Huang about pt  , that he is physically here on the floor. NO pain at this time, VS done.

## 2018-07-29 NOTE — ED PROVIDER NOTES
EMERGENCY DEPARTMENT HISTORY AND PHYSICAL EXAM    2:44 PM      Date: 7/29/2018  Patient Name: Trina Medina    History of Presenting Illness     Chief Complaint   Patient presents with    Wound Check         History Provided By: Patient    Chief Complaint: Right leg redness  Duration:  Days  Timing:  Acute and Worsening  Location: Right leg  Quality: Redness  Severity: Moderate  Modifying Factors: None  Associated Symptoms: Right knee pain, swelling of the right lower leg and foot      Additional History (Context): Trina Medina is a 65 y.o. male with No significant past medical history who presents with right leg redness that started five days ago after a nail punctured his knee. He notes associated right knee pain for the past five days and swelling of the right lower leg and foot and nausea that started two days ago. He reports that he was seen in the ED two days ago and was discharged with Bactrim and Keflex. He has been taking his medications as prescribed as well as four OTC Ibuprofen every 6 hours, but his sx are continuing to worsen. He has been waking up with shakes, so he was suspecting he may be having fevers over night. His wound is continuing to drain pus. He does not have a PCP and has not seen a doctor in 35 years. He denies a hx of diabetes, chest pain, SOB, abdominal pain, vomiting, diarrhea, and any further complaints.     PCP: None    Current Facility-Administered Medications   Medication Dose Route Frequency Provider Last Rate Last Dose    levoFLOXacin (LEVAQUIN) 750 mg in D5W IVPB  750 mg IntraVENous Q24H Braxton Zazueta  mL/hr at 07/29/18 1521 750 mg at 07/29/18 1521    vancomycin (VANCOCIN) 2000 mg in  ml infusion  2,000 mg IntraVENous ONCE Arnie Yolie Romero MD        VANCOMYCIN INFORMATION NOTE 1 Each  1 Each Other Rx Dosing/Monitoring Braxton Zazueta MD        [START ON 7/30/2018] vancomycin (VANCOCIN) 1,000 mg in 0.9% sodium chloride (MBP/ADV) 250 mL adv  1,000 mg IntraVENous Q12H Seamus Mensah MD        Aliza Prado ON 7/31/2018] VANCOMYCIN INFORMATION NOTE   Other ONCE Seamus Mensah MD         Current Outpatient Prescriptions   Medication Sig Dispense Refill    cephALEXin (KEFLEX) 500 mg capsule Take 1 Cap by mouth four (4) times daily for 7 days. 28 Cap 0    trimethoprim-sulfamethoxazole (BACTRIM DS, SEPTRA DS) 160-800 mg per tablet Take 1 Tab by mouth two (2) times a day for 7 days. 14 Tab 0       Past History     Past Medical History:  History reviewed. No pertinent past medical history. Past Surgical History:  History reviewed. No pertinent surgical history. Family History:  History reviewed. No pertinent family history. Social History:  Social History   Substance Use Topics    Smoking status: Current Some Day Smoker    Smokeless tobacco: Current User    Alcohol use None       Allergies: Allergies   Allergen Reactions    Amoxicillin Hives    Other Medication Other (comments)     No narcotics/hx addiction    Penicillins Hives         Review of Systems     Review of Systems   Constitutional: Negative for chills. HENT: Negative for congestion and sore throat. Respiratory: Negative for cough and shortness of breath. Cardiovascular: Positive for leg swelling (right leg and foot swelling). Negative for chest pain. Gastrointestinal: Negative for abdominal pain, diarrhea, nausea and vomiting. Genitourinary: Negative for dysuria. Musculoskeletal: Positive for arthralgias (right knee pain). Negative for back pain. Skin: Positive for color change (right lower leg redness). Negative for rash. Neurological: Negative for dizziness and headaches. All other systems reviewed and are negative. Physical Exam     Visit Vitals    /79    Pulse 79    Temp 98 °F (36.7 °C)    Resp 17    Ht 5' 9\" (1.753 m)    Wt 81.2 kg (179 lb)    SpO2 99%    BMI 26.43 kg/m2       Physical Exam   General Exam: Patient is a well developed and well nourished in no distress. Patient does not appear acutely ill or toxic. Eye Exam: Lids and conjunctiva are normal  ENT Exam: The general head and facial exam is normal.  The neck is supple without meningeal signs. No significant adenopathy. Pulmonary Exam: No respiratory distress. The respiratory rate is normal.  No stridor. The breath sounds are equal bilaterally. There are no wheezes, rales, or rhonchi noted. Cardiac Exam: The cardiac rate and rhythm are normal.  No significant murmurs, rubs, or gallops. The peripheral pulses are normal.  Abdominal Exam: Abdomen is soft and non-distended. No pulsatile masses. There is no local tenderness. There is no rebound or guarding noted. Skin and Soft Tissue: The skin is warm and dry  Musculoskeletal Exam: No peripheral edema. .Right lower extremity redness and swelling from distal thigh to foot. Open wound distal to right knee with no palpable abscess or expressed drainage. No redness overlying the knee or significant effusion. No pain directed at right knee on ROM. Psychiatric: Normal adult with appropriate demeanor and interpersonal interaction. Is oriented to person, place, and time. Diagnostic Study Results     Labs -  Recent Results (from the past 12 hour(s))   CBC WITH AUTOMATED DIFF    Collection Time: 07/29/18  1:50 PM   Result Value Ref Range    WBC 12.8 4.6 - 13.2 K/uL    RBC 4.21 (L) 4.70 - 5.50 M/uL    HGB 13.2 13.0 - 16.0 g/dL    HCT 38.5 36.0 - 48.0 %    MCV 91.4 74.0 - 97.0 FL    MCH 31.4 24.0 - 34.0 PG    MCHC 34.3 31.0 - 37.0 g/dL    RDW 13.3 11.6 - 14.5 %    PLATELET 953 792 - 129 K/uL    MPV 8.8 (L) 9.2 - 11.8 FL    NEUTROPHILS 87 (H) 40 - 73 %    LYMPHOCYTES 7 (L) 21 - 52 %    MONOCYTES 6 3 - 10 %    EOSINOPHILS 0 0 - 5 %    BASOPHILS 0 0 - 2 %    ABS. NEUTROPHILS 11.1 (H) 1.8 - 8.0 K/UL    ABS. LYMPHOCYTES 0.9 0.9 - 3.6 K/UL    ABS. MONOCYTES 0.8 0.05 - 1.2 K/UL    ABS. EOSINOPHILS 0.0 0.0 - 0.4 K/UL    ABS.  BASOPHILS 0.0 0.0 - 0.1 K/UL    DF AUTOMATED METABOLIC PANEL, BASIC    Collection Time: 07/29/18  1:50 PM   Result Value Ref Range    Sodium 139 136 - 145 mmol/L    Potassium 3.8 3.5 - 5.5 mmol/L    Chloride 105 100 - 108 mmol/L    CO2 25 21 - 32 mmol/L    Anion gap 9 3.0 - 18 mmol/L    Glucose 97 74 - 99 mg/dL    BUN 16 7.0 - 18 MG/DL    Creatinine 1.14 0.6 - 1.3 MG/DL    BUN/Creatinine ratio 14 12 - 20      GFR est AA >60 >60 ml/min/1.73m2    GFR est non-AA >60 >60 ml/min/1.73m2    Calcium 8.4 (L) 8.5 - 10.1 MG/DL   POC LACTIC ACID    Collection Time: 07/29/18  1:57 PM   Result Value Ref Range    Lactic Acid (POC) 0.9 0.4 - 2.0 mmol/L       Radiologic Studies -   No orders to display         Medical Decision Making   I am the first provider for this patient. I reviewed the vital signs, available nursing notes, past medical history, past surgical history, family history and social history. Vital Signs-Reviewed the patient's vital signs. Pulse Oximetry Analysis -  99% on room air (Interpretation)WNL    Cardiac Monitor:  Rate: 80 BPM  Rhythm:  Normal Sinus Rhythm       Records Reviewed: Nursing Notes and Old Medical Records (Time of Review: 2:44 PM)    ED Course: Progress Notes, Reevaluation, and Consults:  Consult:  Discussed care with Dr. Nubia Ibarra, Specialty: Hospitalist  Standard discussion; including history of patients chief complaint, available diagnostic results, and treatment course. He agrees on admission. 3:38 PM, 07/29/18       Provider Notes (Medical Decision Making): Pt with worsening RLE redness and swelling after puncture wound. No palpable abscess. Exam not consistent with DVT and suggest worsening cellulitis. Pt taking PO antibiotics with worsening symptoms. Not septic. To be admitted for IV antibiotics. For Hospitalized Patients:    1. Hospitalization Decision Time:  The decision to hospitalize the patient was made by Dr. Chani Koch at 3:13 PM on 7/29/2018    2.  Aspirin: Aspirin was not given because the patient did not present with a stroke at the time of their Emergency Department evaluation    Diagnosis     Clinical Impression:   1. Cellulitis of right leg    2. Failure of outpatient treatment        Disposition: Admit      _______________________________    Attestations:  Scribe [address] [place] [address] acting as a scribe for and in the presence of Loretta Shelton MD      July 29, 2018 at 4:35 PM       Provider Attestation:      I personally performed the services described in the documentation, reviewed the documentation, as recorded by the scribe in my presence, and it accurately and completely records my words and actions.  July 29, 2018 at 4:35 PM - Loretta Shelton MD    _______________________________

## 2018-07-29 NOTE — ED NOTES
TRANSFER - ED to INPATIENT REPORT:    SBAR report made available to receiving floor on this patient being transferred to [address] [place] (2200)  for routine progression of care       Admitting diagnosis Cellulitis of right leg    Information from the following report(s) SBAR was made available to receiving floor. Lines:   Peripheral IV 07/29/18 Left Antecubital (Active)   Site Assessment Clean, dry, & intact 7/29/2018  1:50 PM   Phlebitis Assessment 0 7/29/2018  1:50 PM   Infiltration Assessment 0 7/29/2018  1:50 PM   Dressing Status Clean, dry, & intact 7/29/2018  1:50 PM   Dressing Type Transparent 7/29/2018  1:50 PM   Hub Color/Line Status Green 7/29/2018  1:50 PM        Medication list confirmed with patient    Opportunity for questions and clarification was provided.       Patient is oriented to time, place, person and situation   Patient is  continent and ambulatory without assist     Valuables transported with patient     Patient transported with:   [employer].

## 2018-07-29 NOTE — PROGRESS NOTES
Problem: Falls - Risk of  Goal: *Absence of Falls  Document Yoko Fall Risk and appropriate interventions in the flowsheet.    Outcome: Progressing Towards Goal  Fall Risk Interventions:

## 2018-07-29 NOTE — H&P
Hospitalist Admission Note    NAME:  Sean Estrada   :      MRN:   [id number]     Date/Time:  2018 5:01 PM    Subjective:     CHIEF COMPLAINT:    Chief Complaint   Patient presents with    Wound Check       HISTORY OF PRESENT ILLNESS:     Ayla Root is a 65 y.o.  male with h/o skin cancer - basal cell ca several years ago,came to the ED on 17 for a wound underneath his right knee associated with redness of leg. Patient says that this happened when he was painting the house,then got down on his knees and a nail punched in. In ER he was prescribed bactrim and keflex then discharged. Today he noticed that the redness has progressed above his knee and down to his ankle. The leg is now swollen,saying that the knee is not tender. The wound looks infected. He denies fever,chills. Xray of knee on 2017 w/o abnormality. He will be admitted for cellulitis with infected wound. Past Medical History:   Diagnosis Date    Skin cancer         Social History   Substance Use Topics    Smoking status: Current Some Day Smoker    Smokeless tobacco: Current User    Alcohol use Not on file        History reviewed. No pertinent family history. Allergies   Allergen Reactions    Amoxicillin Hives    Other Medication Other (comments)     No narcotics/hx addiction    Penicillins Hives        Prior to Admission medications    Medication Sig Start Date End Date Taking? Authorizing Provider   cephALEXin (KEFLEX) 500 mg capsule Take 1 Cap by mouth four (4) times daily for 7 days. 7/27/18 8/3/18  Earma Fresh, DO   trimethoprim-sulfamethoxazole (BACTRIM DS, SEPTRA DS) 160-800 mg per tablet Take 1 Tab by mouth two (2) times a day for 7 days. 7/27/18 8/3/18  Earma Fresh, DO       REVIEW OF SYSTEMS:    Constitutional:  No fever or weight loss  HEENT:  No headache or visual changes  Cardiovascular:  No chest pain, no palpitations. Respiratory:  No coughing, wheezing, or shortness of breath. GI:  No abdominal pain. No nausea or vomiting. No diarrhea  :  No hematuria or dysuria. No frequency, retention, urinary incontinence. Extr;swollen right leg with open wound underneath right knee. Skin:  No rashes or moles  Neuro:  No seizures or syncope  Hematological:  No bruising or bleeding  Endocrine:  No diabetes or thyroid disease  Objective:   VITALS:       Visit Vitals    /70    Pulse 80    Temp 98 °F (36.7 °C)    Resp 22    Ht 5' 9\" (1.753 m)    Wt 81.2 kg (179 lb)    SpO2 99%    BMI 26.43 kg/m2       O2 Device: Room air    PHYSICAL EXAM:   General:    Lying in bed in no acute distress. HEENT:  Pupils equal.  Sclera anicteric. Conjunctiva pink. Mucous membranes                           moist  Neck:  Supple. Trachea midline. No accessory muscle use. No thyromegaly. No jugular venous distention  CV:                  Regular rate and rhythm. Lungs:   Clear to auscultation bilaterally. No Wheezing or Rhonchi. No rales. Normal to percussion  Abdomen:   Soft, non-tender. Not distended. Bowel sounds normal. No organomegaly  Extremities: Swollen,red right leg from above knee to around the right ankle. Swelling also involving the right foot. Open wound 2x2 cm,with sero-purulent drainage underneath right knee. Neurologic: Alert and oriented X 3. LAB DATA REVIEWED:    Recent Results (from the past 24 hour(s))   CBC WITH AUTOMATED DIFF    Collection Time: 07/29/18  1:50 PM   Result Value Ref Range    WBC 12.8 4.6 - 13.2 K/uL    RBC 4.21 (L) 4.70 - 5.50 M/uL    HGB 13.2 13.0 - 16.0 g/dL    HCT 38.5 36.0 - 48.0 %    MCV 91.4 74.0 - 97.0 FL    MCH 31.4 24.0 - 34.0 PG    MCHC 34.3 31.0 - 37.0 g/dL    RDW 13.3 11.6 - 14.5 %    PLATELET 997 717 - 810 K/uL    MPV 8.8 (L) 9.2 - 11.8 FL    NEUTROPHILS 87 (H) 40 - 73 %    LYMPHOCYTES 7 (L) 21 - 52 %    MONOCYTES 6 3 - 10 %    EOSINOPHILS 0 0 - 5 %    BASOPHILS 0 0 - 2 %    ABS.  NEUTROPHILS 11.1 (H) 1.8 - 8.0 K/UL ABS. LYMPHOCYTES 0.9 0.9 - 3.6 K/UL    ABS. MONOCYTES 0.8 0.05 - 1.2 K/UL    ABS. EOSINOPHILS 0.0 0.0 - 0.4 K/UL    ABS. BASOPHILS 0.0 0.0 - 0.1 K/UL    DF AUTOMATED     METABOLIC PANEL, BASIC    Collection Time: 07/29/18  1:50 PM   Result Value Ref Range    Sodium 139 136 - 145 mmol/L    Potassium 3.8 3.5 - 5.5 mmol/L    Chloride 105 100 - 108 mmol/L    CO2 25 21 - 32 mmol/L    Anion gap 9 3.0 - 18 mmol/L    Glucose 97 74 - 99 mg/dL    BUN 16 7.0 - 18 MG/DL    Creatinine 1.14 0.6 - 1.3 MG/DL    BUN/Creatinine ratio 14 12 - 20      GFR est AA >60 >60 ml/min/1.73m2    GFR est non-AA >60 >60 ml/min/1.73m2    Calcium 8.4 (L) 8.5 - 10.1 MG/DL   POC LACTIC ACID    Collection Time: 07/29/18  1:57 PM   Result Value Ref Range    Lactic Acid (POC) 0.9 0.4 - 2.0 mmol/L   POC LACTIC ACID    Collection Time: 07/29/18  4:32 PM   Result Value Ref Range    Lactic Acid (POC) 1.2 0.4 - 2.0 mmol/L       Assessment/Plan:      Principal Problem:    Cellulitis of right leg (7/29/2018)    Active Problems:    Infected wound (7/29/2018)      Tobacco abuse (7/29/2018)       ___________________________________________________  PLAN:    1. Cellulitis of right leg   -Blood cx   -Vanc/levaquin iv   -Elevate leg while in bed. -PVL rt knee. 2.Infected wound   -Received tetanus shot on 7/27   -Wound cx   -On atbx   -Wound care consult    3. Tobacco abuse    -Declined nicoderm patch   -Educated cessation    4. H/o skin cancer   -Needs follow-up op    DVT prophylaxis:lovenox  Full code  Disposition:tbd    ___________________________________________________  Admitting Physician: Chu Bonilla MD

## 2018-07-30 ENCOUNTER — APPOINTMENT (OUTPATIENT)
Dept: MRI IMAGING | Age: 63
DRG: 464 | End: 2018-07-30
Attending: NURSE PRACTITIONER
Payer: SELF-PAY

## 2018-07-30 ENCOUNTER — APPOINTMENT (OUTPATIENT)
Dept: VASCULAR SURGERY | Age: 63
DRG: 464 | End: 2018-07-30
Attending: INTERNAL MEDICINE
Payer: SELF-PAY

## 2018-07-30 ENCOUNTER — APPOINTMENT (OUTPATIENT)
Dept: GENERAL RADIOLOGY | Age: 63
DRG: 464 | End: 2018-07-30
Attending: HOSPITALIST
Payer: SELF-PAY

## 2018-07-30 LAB
ANION GAP SERPL CALC-SCNC: 8 MMOL/L (ref 3–18)
APPEARANCE UR: CLEAR
BACTERIA URNS QL MICRO: NEGATIVE /HPF
BASOPHILS # BLD: 0 K/UL (ref 0–0.1)
BASOPHILS NFR BLD: 0 % (ref 0–2)
BILIRUB UR QL: NEGATIVE
BUN SERPL-MCNC: 10 MG/DL (ref 7–18)
BUN/CREAT SERPL: 12 (ref 12–20)
CALCIUM SERPL-MCNC: 8.2 MG/DL (ref 8.5–10.1)
CHLORIDE SERPL-SCNC: 107 MMOL/L (ref 100–108)
CO2 SERPL-SCNC: 25 MMOL/L (ref 21–32)
COLOR UR: YELLOW
CREAT SERPL-MCNC: 0.85 MG/DL (ref 0.6–1.3)
DIFFERENTIAL METHOD BLD: ABNORMAL
EOSINOPHIL # BLD: 0.1 K/UL (ref 0–0.4)
EOSINOPHIL NFR BLD: 1 % (ref 0–5)
EPITH CASTS URNS QL MICRO: ABNORMAL /LPF (ref 0–5)
ERYTHROCYTE [DISTWIDTH] IN BLOOD BY AUTOMATED COUNT: 13.5 % (ref 11.6–14.5)
GLUCOSE SERPL-MCNC: 112 MG/DL (ref 74–99)
GLUCOSE UR STRIP.AUTO-MCNC: NEGATIVE MG/DL
HCT VFR BLD AUTO: 33.6 % (ref 36–48)
HGB BLD-MCNC: 11.2 G/DL (ref 13–16)
HGB UR QL STRIP: ABNORMAL
KETONES UR QL STRIP.AUTO: NEGATIVE MG/DL
LEUKOCYTE ESTERASE UR QL STRIP.AUTO: NEGATIVE
LYMPHOCYTES # BLD: 0.6 K/UL (ref 0.9–3.6)
LYMPHOCYTES NFR BLD: 10 % (ref 21–52)
MCH RBC QN AUTO: 30.5 PG (ref 24–34)
MCHC RBC AUTO-ENTMCNC: 33.3 G/DL (ref 31–37)
MCV RBC AUTO: 91.6 FL (ref 74–97)
MONOCYTES # BLD: 0.8 K/UL (ref 0.05–1.2)
MONOCYTES NFR BLD: 12 % (ref 3–10)
NEUTS SEG # BLD: 5.1 K/UL (ref 1.8–8)
NEUTS SEG NFR BLD: 77 % (ref 40–73)
NITRITE UR QL STRIP.AUTO: NEGATIVE
PH UR STRIP: 6 [PH] (ref 5–8)
PLATELET # BLD AUTO: 178 K/UL (ref 135–420)
PMV BLD AUTO: 8.8 FL (ref 9.2–11.8)
POTASSIUM SERPL-SCNC: 3.9 MMOL/L (ref 3.5–5.5)
PROT UR STRIP-MCNC: 30 MG/DL
RBC # BLD AUTO: 3.67 M/UL (ref 4.7–5.5)
RBC #/AREA URNS HPF: ABNORMAL /HPF (ref 0–5)
SODIUM SERPL-SCNC: 140 MMOL/L (ref 136–145)
SP GR UR REFRACTOMETRY: 1.02 (ref 1–1.03)
UROBILINOGEN UR QL STRIP.AUTO: 1 EU/DL (ref 0.2–1)
WBC # BLD AUTO: 6.6 K/UL (ref 4.6–13.2)
WBC URNS QL MICRO: ABNORMAL /HPF (ref 0–4)

## 2018-07-30 PROCEDURE — 87086 URINE CULTURE/COLONY COUNT: CPT | Performed by: HOSPITALIST

## 2018-07-30 PROCEDURE — 74011250636 HC RX REV CODE- 250/636: Performed by: EMERGENCY MEDICINE

## 2018-07-30 PROCEDURE — 74011250636 HC RX REV CODE- 250/636: Performed by: INTERNAL MEDICINE

## 2018-07-30 PROCEDURE — 87040 BLOOD CULTURE FOR BACTERIA: CPT | Performed by: HOSPITALIST

## 2018-07-30 PROCEDURE — 73723 MRI JOINT LWR EXTR W/O&W/DYE: CPT

## 2018-07-30 PROCEDURE — 36415 COLL VENOUS BLD VENIPUNCTURE: CPT | Performed by: INTERNAL MEDICINE

## 2018-07-30 PROCEDURE — 93971 EXTREMITY STUDY: CPT

## 2018-07-30 PROCEDURE — 65270000029 HC RM PRIVATE

## 2018-07-30 PROCEDURE — 85025 COMPLETE CBC W/AUTO DIFF WBC: CPT | Performed by: INTERNAL MEDICINE

## 2018-07-30 PROCEDURE — 71045 X-RAY EXAM CHEST 1 VIEW: CPT

## 2018-07-30 PROCEDURE — 74011250636 HC RX REV CODE- 250/636: Performed by: HOSPITALIST

## 2018-07-30 PROCEDURE — 74011250637 HC RX REV CODE- 250/637: Performed by: INTERNAL MEDICINE

## 2018-07-30 PROCEDURE — 77030011256 HC DRSG MEPILEX <16IN NO BORD MOLN -A

## 2018-07-30 PROCEDURE — A9575 INJ GADOTERATE MEGLUMI 0.1ML: HCPCS | Performed by: HOSPITALIST

## 2018-07-30 PROCEDURE — 81001 URINALYSIS AUTO W/SCOPE: CPT | Performed by: HOSPITALIST

## 2018-07-30 PROCEDURE — 74011250637 HC RX REV CODE- 250/637: Performed by: HOSPITALIST

## 2018-07-30 PROCEDURE — 80048 BASIC METABOLIC PNL TOTAL CA: CPT | Performed by: INTERNAL MEDICINE

## 2018-07-30 RX ORDER — HYDROCODONE BITARTRATE AND ACETAMINOPHEN 5; 325 MG/1; MG/1
1 TABLET ORAL
Status: DISCONTINUED | OUTPATIENT
Start: 2018-07-30 | End: 2018-07-31 | Stop reason: SDUPTHER

## 2018-07-30 RX ORDER — GADOTERATE MEGLUMINE 376.9 MG/ML
15 INJECTION INTRAVENOUS
Status: COMPLETED | OUTPATIENT
Start: 2018-07-30 | End: 2018-07-30

## 2018-07-30 RX ADMIN — HYDROCODONE BITARTRATE AND ACETAMINOPHEN 1 TABLET: 5; 325 TABLET ORAL at 15:19

## 2018-07-30 RX ADMIN — ACETAMINOPHEN 650 MG: 325 TABLET, FILM COATED ORAL at 01:38

## 2018-07-30 RX ADMIN — HYDROCODONE BITARTRATE AND ACETAMINOPHEN 1 TABLET: 5; 325 TABLET ORAL at 21:56

## 2018-07-30 RX ADMIN — Medication 10 ML: at 21:57

## 2018-07-30 RX ADMIN — ENOXAPARIN SODIUM 40 MG: 100 INJECTION SUBCUTANEOUS at 18:33

## 2018-07-30 RX ADMIN — SODIUM CHLORIDE 1000 MG: 900 INJECTION, SOLUTION INTRAVENOUS at 05:02

## 2018-07-30 RX ADMIN — SODIUM CHLORIDE 1000 MG: 900 INJECTION, SOLUTION INTRAVENOUS at 17:18

## 2018-07-30 RX ADMIN — ACETAMINOPHEN 650 MG: 325 TABLET, FILM COATED ORAL at 08:20

## 2018-07-30 RX ADMIN — Medication 10 ML: at 00:28

## 2018-07-30 RX ADMIN — LEVOFLOXACIN 750 MG: 5 INJECTION, SOLUTION INTRAVENOUS at 15:20

## 2018-07-30 RX ADMIN — GADOTERATE MEGLUMINE 15 ML: 376.9 INJECTION INTRAVENOUS at 14:00

## 2018-07-30 NOTE — PROGRESS NOTES
Care Management Interventions  PCP Verified by CM: Yes  Palliative Care Criteria Met (RRAT>21 & CHF Dx)?: No  Transition of Care Consult (CM Consult): Discharge Planning  Current Support Network: Lives Alone  Confirm Follow Up Transport: Family  Plan discussed with Pt/Family/Caregiver: Yes  Discharge Location  Discharge Placement: Home     Reason for Admission:  Cellulitis                     RRAT Score:    green                 Plan for utilizing home health: if recommended                         Likelihood of Readmission: green                         Transition of Care Plan:        Spoke with patient in room, he stated that he lives alone. He has been independent with his care and uses no DME's. He stated that he is unemployed, has no PCP, No insurance He verified his address and phone # as correct on the facesheet.  consult ordered. Patient has designated ________Nephew________________ to participate in his/her discharge plan and to receive any needed information. Leidy Hamilton [phone].  :His d/c plan is for him to return home when medically cleared. He will arrange transport with family.

## 2018-07-30 NOTE — ROUTINE PROCESS
Assumed care of patient at 1915 report received from Graham Epperson. IRAM. Alert & oriented, denies nausea & distress. RLE red, swelling, dressing intact. Call bell within reach. All safety precautions in place - safety maintained. 2030 -   Urine obtained & sent to Lab. Patient resting quietly. 2156 - Medicated pt for pain. Pain rate 6/10. Elevated right leg with pillows. 0300 - dressing came off, new dressing applied to Right knee. 0430 -  Medicated  pt for pain. 0500 -  Applied new dressing to Right knee, well tolerated. 0730 -  Bedside and Verbal shift change report given to Crow SEGURA RN (oncoming nurse) by Pedro Fagan RN BSN (offgoing nurse). Report given with SBAR, Kardex, Intake/Output, MAR and Recent Results.

## 2018-07-30 NOTE — PROGRESS NOTES
Patient has developed fever to 102. Already on broad spectrum antibiotics. Will send blood culture X 2 and urine culture, CXR. If temperature does not come back down promptly we will need to consider adding additional antibiotic coverage.

## 2018-07-30 NOTE — PROGRESS NOTES
conducted an initial consultation and Spiritual Assessment for Sean Estrada, who is a 65 y.o.,male. Patients Primary Language is: [place]. According to the patients EMR Islam Affiliation is: No preference. The reason the Patient came to the hospital is:   Patient Active Problem List    Diagnosis Date Noted    Cellulitis of right leg 07/29/2018    Infected wound 07/29/2018    Tobacco abuse 07/29/2018        The  provided the following Interventions:  Initiated a relationship of care and support. Explored issues of seema, belief, spirituality and Baptism/ritual needs while hospitalized. Listened empathically. Provided information about Spiritual Care Services. Offered prayer and assurance of continued prayers on patient's behalf. Chart reviewed. The following outcomes were achieved:  Patient shared limited information about both their medical narrative and spiritual journey/beliefs. Patient processed feeling about current hospitalization. Patient expressed gratitude for 's visit. Assessment:  Patient does not have any Baptism/cultural needs that will affect patients preferences in health care. There are no further spiritual or Baptism issues which require intervention at this time. Plan:  Chaplains will continue to follow and will provide pastoral care on an as needed/requested basis.  recommends bedside caregivers page  on duty if patient shows signs of acute spiritual or emotional distress. Thomas Golden M.Div.   [facility] [address]  [phone]

## 2018-07-30 NOTE — PROGRESS NOTES
Problem: Falls - Risk of  Goal: *Absence of Falls  Document Yoko Fall Risk and appropriate interventions in the flowsheet. Outcome: Progressing Towards Goal  Fall Risk Interventions:  Mobility Interventions: Patient to call before getting OOB, Assess mobility with egress test, Strengthening exercises (ROM-active/passive)         Medication Interventions: Evaluate medications/consider consulting pharmacy, Patient to call before getting OOB, Teach patient to arise slowly         History of Falls Interventions: Door open when patient unattended        Problem: Pressure Injury - Risk of  Goal: *Prevention of pressure injury  Document Erick Scale and appropriate interventions in the flowsheet. Outcome: Progressing Towards Goal  Pressure Injury Interventions:             Activity Interventions: Increase time out of bed, Pressure redistribution bed/mattress(bed type)    Mobility Interventions: HOB 30 degrees or less, Pressure redistribution bed/mattress (bed type)    Nutrition Interventions: Document food/fluid/supplement intake, Offer support with meals,snacks and hydration    Friction and Shear Interventions: HOB 30 degrees or less

## 2018-07-30 NOTE — PROGRESS NOTES
Problem: Discharge Planning  Goal: *Discharge to safe environment  Outcome: Progressing Towards Goal  Home, home health if recommended

## 2018-07-30 NOTE — PROGRESS NOTES
[facility]  Hospitalist Division        Inpatient Daily Progress Note    Daily progress Note    Patient: Wilma Angeles MRN: [id number]  CSN: [id number]    YOB: 1955  Age: 65 y.o. Sex: male    DOA: 7/29/2018 LOS:  LOS: 1 day                    Chief Complaint:  Cellulitis right leg    Interval History: 64 y/o male with hx of Basal Cell Skin CA presented to the ED on 7/27 for wound and redness underneath right knee. Pt stated this happened when he was painting the house, he got down on his knee and a nail punched in. Xray of knee w/o abnormality. In the ED he was prescribed Bactrim and Keflex and discharged. He presented back to the ED on7/29 for wound check- pt noticed edema and redness has progressed above his knee and down to his ankle. Lactic acid normal, blood culture collected x 1 NGTD, wound culture with moderate WBC's, no organisms seen. Pt started on IV Abx and admitted for further management of care. Wound care following. PVL RLE negative. MRI right knee pending. Assessment/Plan:     Patient Active Problem List   Diagnosis Code    Cellulitis of right leg L03.115    Infected wound T14. 8XXA, L08.9    Tobacco abuse Z72.0       A/P:  Cellulitis right leg  - seen in ED on 7/27- xray normal, d/c with Keflex and Bactrim  - presented back to ED on 7/29 for wound check, now with edema and progression of erythema  - blood culture collected 7/29 x 1 NGTD  - wound culture with moderate WBC's, no organisms seen  - IV Vanc and Levaquin  - PVL RLE negative   - normal WBC, afebrile, lactic acid normal  - MRI right knee pending    Infected Wound  - received Tetanus shot on 7/27  - wound culture with moderate WBC's, no organisms seen  - wound care consult, appreciate input  - continue IV Vanc and Levaquin    Tobacco abuse  - declined nicoderm patch  - educated on cessation    Hx of Basal Cell Skin CA  - f/u outpatient    DVT Prophylaxis  - Lovenox daily      Adrain Soulier, FNP-C  [address] [place] [address]  Hospitalist Division  Pager:  [phone]  Office:  [phone]        Subjective:      Has some pain erythema and edema to RLE. PVL RLE negative. Pt seen at bedside while wound care nurse doing eval- packed with iodoform, recommends MRI (results currently pending). Pt anticipating being able to return to work and also has a vacation planned in the next ~2 weeks. Objective:      Visit Vitals    /68 (BP 1 Location: Right arm, BP Patient Position: At rest)    Pulse 77    Temp 98.4 °F (36.9 °C)    Resp 18    Ht 5' 9\" (1.753 m)    Wt 81.2 kg (179 lb)    SpO2 97%    BMI 26.43 kg/m2           Physical Exam:  General appearance: alert, cooperative, no distress, appears stated age  Head: Normocephalic, without obvious abnormality, atraumatic  Lungs: clear to auscultation bilaterally  Heart: regular rate and rhythm, S1, S2 normal, no murmur, click, rub or gallop  Abdomen: soft, non tender, non distended. Normoactive bowel sounds. Extremities:erythema, 2+ edema RLE, DP pulse palpable, wound to anterior right knee with purulent drainage   Skin: see above  Neurologic: Grossly normal  PSY: Mood and affect normal, appropriately behaved        Intake and Output:  Current Shift:     Last three shifts:  07/28 1901 - 07/30 0700  In: 1231.3 [P.O.:480; I.V.:751.3]  Out: 400 [Urine:400]    Recent Results (from the past 24 hour(s))   CBC WITH AUTOMATED DIFF    Collection Time: 07/29/18  1:50 PM   Result Value Ref Range    WBC 12.8 4.6 - 13.2 K/uL    RBC 4.21 (L) 4.70 - 5.50 M/uL    HGB 13.2 13.0 - 16.0 g/dL    HCT 38.5 36.0 - 48.0 %    MCV 91.4 74.0 - 97.0 FL    MCH 31.4 24.0 - 34.0 PG    MCHC 34.3 31.0 - 37.0 g/dL    RDW 13.3 11.6 - 14.5 %    PLATELET 665 710 - 842 K/uL    MPV 8.8 (L) 9.2 - 11.8 FL    NEUTROPHILS 87 (H) 40 - 73 %    LYMPHOCYTES 7 (L) 21 - 52 %    MONOCYTES 6 3 - 10 %    EOSINOPHILS 0 0 - 5 %    BASOPHILS 0 0 - 2 %    ABS. NEUTROPHILS 11.1 (H) 1.8 - 8.0 K/UL    ABS. LYMPHOCYTES 0.9 0.9 - 3.6 K/UL    ABS. MONOCYTES 0.8 0.05 - 1.2 K/UL    ABS. EOSINOPHILS 0.0 0.0 - 0.4 K/UL    ABS. BASOPHILS 0.0 0.0 - 0.1 K/UL    DF AUTOMATED     METABOLIC PANEL, BASIC    Collection Time: 07/29/18  1:50 PM   Result Value Ref Range    Sodium 139 136 - 145 mmol/L    Potassium 3.8 3.5 - 5.5 mmol/L    Chloride 105 100 - 108 mmol/L    CO2 25 21 - 32 mmol/L    Anion gap 9 3.0 - 18 mmol/L    Glucose 97 74 - 99 mg/dL    BUN 16 7.0 - 18 MG/DL    Creatinine 1.14 0.6 - 1.3 MG/DL    BUN/Creatinine ratio 14 12 - 20      GFR est AA >60 >60 ml/min/1.73m2    GFR est non-AA >60 >60 ml/min/1.73m2    Calcium 8.4 (L) 8.5 - 10.1 MG/DL   POC LACTIC ACID    Collection Time: 07/29/18  1:57 PM   Result Value Ref Range    Lactic Acid (POC) 0.9 0.4 - 2.0 mmol/L   POC LACTIC ACID    Collection Time: 07/29/18  4:32 PM   Result Value Ref Range    Lactic Acid (POC) 1.2 0.4 - 2.0 mmol/L   METABOLIC PANEL, BASIC    Collection Time: 07/29/18  8:02 PM   Result Value Ref Range    Sodium 140 136 - 145 mmol/L    Potassium 3.9 3.5 - 5.5 mmol/L    Chloride 105 100 - 108 mmol/L    CO2 28 21 - 32 mmol/L    Anion gap 7 3.0 - 18 mmol/L    Glucose 114 (H) 74 - 99 mg/dL    BUN 15 7.0 - 18 MG/DL    Creatinine 1.07 0.6 - 1.3 MG/DL    BUN/Creatinine ratio 14 12 - 20      GFR est AA >60 >60 ml/min/1.73m2    GFR est non-AA >60 >60 ml/min/1.73m2    Calcium 8.1 (L) 8.5 - 10.1 MG/DL   CBC WITH AUTOMATED DIFF    Collection Time: 07/29/18  8:02 PM   Result Value Ref Range    WBC 10.5 4.6 - 13.2 K/uL    RBC 3.87 (L) 4.70 - 5.50 M/uL    HGB 12.1 (L) 13.0 - 16.0 g/dL    HCT 35.9 (L) 36.0 - 48.0 %    MCV 92.8 74.0 - 97.0 FL    MCH 31.3 24.0 - 34.0 PG    MCHC 33.7 31.0 - 37.0 g/dL    RDW 13.5 11.6 - 14.5 %    PLATELET 676 040 - 083 K/uL    MPV 8.8 (L) 9.2 - 11.8 FL    NEUTROPHILS 82 (H) 40 - 73 %    LYMPHOCYTES 7 (L) 21 - 52 %    MONOCYTES 10 3 - 10 %    EOSINOPHILS 1 0 - 5 %    BASOPHILS 0 0 - 2 %    ABS. NEUTROPHILS 8.6 (H) 1.8 - 8.0 K/UL    ABS. LYMPHOCYTES 0.8 (L) 0.9 - 3.6 K/UL    ABS. MONOCYTES 1.0 0.05 - 1.2 K/UL    ABS. EOSINOPHILS 0.1 0.0 - 0.4 K/UL    ABS. BASOPHILS 0.0 0.0 - 0.1 K/UL    DF AUTOMATED     LACTIC ACID    Collection Time: 07/29/18  8:02 PM   Result Value Ref Range    Lactic acid 1.1 0.4 - 2.0 MMOL/L   METABOLIC PANEL, BASIC    Collection Time: 07/30/18  5:05 AM   Result Value Ref Range    Sodium 140 136 - 145 mmol/L    Potassium 3.9 3.5 - 5.5 mmol/L    Chloride 107 100 - 108 mmol/L    CO2 25 21 - 32 mmol/L    Anion gap 8 3.0 - 18 mmol/L    Glucose 112 (H) 74 - 99 mg/dL    BUN 10 7.0 - 18 MG/DL    Creatinine 0.85 0.6 - 1.3 MG/DL    BUN/Creatinine ratio 12 12 - 20      GFR est AA >60 >60 ml/min/1.73m2    GFR est non-AA >60 >60 ml/min/1.73m2    Calcium 8.2 (L) 8.5 - 10.1 MG/DL   CBC WITH AUTOMATED DIFF    Collection Time: 07/30/18  5:05 AM   Result Value Ref Range    WBC 6.6 4.6 - 13.2 K/uL    RBC 3.67 (L) 4.70 - 5.50 M/uL    HGB 11.2 (L) 13.0 - 16.0 g/dL    HCT 33.6 (L) 36.0 - 48.0 %    MCV 91.6 74.0 - 97.0 FL    MCH 30.5 24.0 - 34.0 PG    MCHC 33.3 31.0 - 37.0 g/dL    RDW 13.5 11.6 - 14.5 %    PLATELET 635 872 - 962 K/uL    MPV 8.8 (L) 9.2 - 11.8 FL    NEUTROPHILS 77 (H) 40 - 73 %    LYMPHOCYTES 10 (L) 21 - 52 %    MONOCYTES 12 (H) 3 - 10 %    EOSINOPHILS 1 0 - 5 %    BASOPHILS 0 0 - 2 %    ABS. NEUTROPHILS 5.1 1.8 - 8.0 K/UL    ABS. LYMPHOCYTES 0.6 (L) 0.9 - 3.6 K/UL    ABS. MONOCYTES 0.8 0.05 - 1.2 K/UL    ABS. EOSINOPHILS 0.1 0.0 - 0.4 K/UL    ABS. BASOPHILS 0.0 0.0 - 0.1 K/UL    DF AUTOMATED             Lab Results   Component Value Date/Time    Glucose 112 (H) 07/30/2018 05:05 AM    Glucose 114 (H) 07/29/2018 08:02 PM    Glucose 97 07/29/2018 01:50 PM    Glucose 109 (H) 07/27/2018 07:10 PM        Imaging:  No results found.     Medication List Reviewed:  Current Facility-Administered Medications   Medication Dose Route Frequency    levoFLOXacin (LEVAQUIN) 750 mg in D5W IVPB  750 mg IntraVENous Q24H    VANCOMYCIN INFORMATION NOTE 1 Each  1 Each Other Rx Dosing/Monitoring    vancomycin (VANCOCIN) 1,000 mg in 0.9% sodium chloride (MBP/ADV) 250 mL adv  1,000 mg IntraVENous Q12H    [START ON 7/31/2018] VANCOMYCIN INFORMATION NOTE   Other ONCE    sodium chloride (NS) flush 5-10 mL  5-10 mL IntraVENous Q8H    sodium chloride (NS) flush 5-10 mL  5-10 mL IntraVENous PRN    enoxaparin (LOVENOX) injection 40 mg  40 mg SubCUTAneous Q24H    0.9% sodium chloride infusion  75 mL/hr IntraVENous CONTINUOUS    acetaminophen (TYLENOL) tablet 650 mg  650 mg Oral Q6H PRN

## 2018-07-30 NOTE — PROGRESS NOTES
Beside shift report received from RITA Long with sbar  To vascular lab via stretcher  Wound care nurse in with patient  To MRI    1420 returned from MRI  1452 paged  to obtain pain med  1500 paged   1515 medicated for pain  1620 temp. 102.4 paged [facility] notified of temp, will see patient  Bedside shift report given to CAPO Dela Cruz R.N.with sbar  Patient instructed on collecting clean catch urine

## 2018-07-30 NOTE — WOUND CARE
General Progress Note    Patient: Delores Swain MRN: [id number] LOS: 1     YOB: 1955  Age: 65 y.o. Sex: male        Admit Date: 7/29/2018      Subjective:   Patient verbalized some intermittent discomfort when assessing wound depth however tolerable no other pain or discomfort with wound care assessment/treatment. Per patient \" I have a feeling of tightness in the back of my knee when I bend my leg. \"  Right leg edematous and erythema extending from above the nee to the foot. Frank Nova NP bedside. Report to Giovanna Weber RN; orders received, Frank Nova, Nurse Practitioner     Objective:     Vitals  Patient Vitals for the past 8 hrs:   BP Temp Pulse Resp SpO2   07/30/18 0744 111/68 98.4 °F (36.9 °C) 77 18 97 %       I/O Current Shift       I/O Last Three Shifts  07/28 1901 - 07/30 0700  In: 2676.3 [P.O.:1480; I.V.:1196.3]  Out: 750 [Urine:750]              Assessment:     Wound Presentation:     07/30/18 1015   Wound Knee Right   Date First Assessed/Time First Assessed: 07/29/18 2136   POA: Yes  Wound Type: Puncture  Location: Knee  Orientation: Right   DRESSING STATUS Removed   DRESSING TYPE Gauze;Special tape (comment)   Non-Pressure Injury Full thickness (subcut/muscle)   Wound Length (cm) 1.3 cm   Wound Width (cm) 1.7 cm   Wound Depth (cm) 1.2   Wound Surface area (cm^2) 2.21 cm^2   Condition of Base Slough   Condition of Edges Open   Tissue Type Yellow   Tissue Type Percent Yellow 100   Direction of Undermining 12    oclock;1    oclock;2    oclock;3    oclock;4    oclock;5    oclock;6    oclock;7    oclock;8    oclock;9    oclock;10    oclock;11    oclock   Depth of Undermining (cm) 0.7   Drainage Amount  Large   Drainage Color Purulent;Sanguinous   Wound Odor None   Periwound Skin Condition Edematous; Erythema, blanchable; Increased warmth   Cleansing and Cleansing Agents  Normal saline   Dressing Type Applied Packing;Silicone  (3/9OLFX Iodoform Packing, Mepilex)   Procedure Tolerated Well               Principal Problem:    Cellulitis of right leg (7/29/2018)    Active Problems:    Infected wound (7/29/2018)      Tobacco abuse (7/29/2018)           bed rails up x3, bed low and locked, lights dimmed for comfort, and call bell within patient's reach. Patient verbalized understanding to all instructions provided but needs additional instruction. Plan:     Nursing to Perform Daily:  Wound Care/Dressing change to RIGHT KNEE: Cleanse wound with normal saline, apply no sting skin prep to periwound skin, gently fill wound depth with 1/4inch Iodoform packing (ensure tail of Iodoform left remaining from wound base for removal at next dressing change),  cover with Mepilex, may secure with hypafix tape. Discussed with Jalen Ramirez NP possible further imaging as purulent drainage is still able to expressed.     Spencer English RN, [address] [place] [address],[address]

## 2018-07-31 ENCOUNTER — ANESTHESIA EVENT (OUTPATIENT)
Dept: SURGERY | Age: 63
DRG: 464 | End: 2018-07-31
Payer: SELF-PAY

## 2018-07-31 ENCOUNTER — ANESTHESIA (OUTPATIENT)
Dept: SURGERY | Age: 63
DRG: 464 | End: 2018-07-31
Payer: SELF-PAY

## 2018-07-31 LAB
ANION GAP SERPL CALC-SCNC: 10 MMOL/L (ref 3–18)
BASOPHILS # BLD: 0 K/UL (ref 0–0.1)
BASOPHILS NFR BLD: 0 % (ref 0–2)
BUN SERPL-MCNC: 9 MG/DL (ref 7–18)
BUN/CREAT SERPL: 9 (ref 12–20)
CALCIUM SERPL-MCNC: 8.2 MG/DL (ref 8.5–10.1)
CHLORIDE SERPL-SCNC: 106 MMOL/L (ref 100–108)
CO2 SERPL-SCNC: 25 MMOL/L (ref 21–32)
CREAT SERPL-MCNC: 1 MG/DL (ref 0.6–1.3)
DIFFERENTIAL METHOD BLD: ABNORMAL
EOSINOPHIL # BLD: 0.1 K/UL (ref 0–0.4)
EOSINOPHIL NFR BLD: 1 % (ref 0–5)
ERYTHROCYTE [DISTWIDTH] IN BLOOD BY AUTOMATED COUNT: 13.6 % (ref 11.6–14.5)
GLUCOSE SERPL-MCNC: 118 MG/DL (ref 74–99)
HCT VFR BLD AUTO: 33.6 % (ref 36–48)
HGB BLD-MCNC: 11.3 G/DL (ref 13–16)
LYMPHOCYTES # BLD: 1.3 K/UL (ref 0.9–3.6)
LYMPHOCYTES NFR BLD: 18 % (ref 21–52)
MCH RBC QN AUTO: 30.8 PG (ref 24–34)
MCHC RBC AUTO-ENTMCNC: 33.6 G/DL (ref 31–37)
MCV RBC AUTO: 91.6 FL (ref 74–97)
MONOCYTES # BLD: 0.9 K/UL (ref 0.05–1.2)
MONOCYTES NFR BLD: 13 % (ref 3–10)
NEUTS SEG # BLD: 4.7 K/UL (ref 1.8–8)
NEUTS SEG NFR BLD: 68 % (ref 40–73)
PLATELET # BLD AUTO: 220 K/UL (ref 135–420)
PMV BLD AUTO: 8.7 FL (ref 9.2–11.8)
POTASSIUM SERPL-SCNC: 3.7 MMOL/L (ref 3.5–5.5)
RBC # BLD AUTO: 3.67 M/UL (ref 4.7–5.5)
SODIUM SERPL-SCNC: 141 MMOL/L (ref 136–145)
VANCOMYCIN TROUGH SERPL-MCNC: 6.5 UG/ML (ref 10–20)
WBC # BLD AUTO: 7 K/UL (ref 4.6–13.2)

## 2018-07-31 PROCEDURE — 77030019895 HC PCKNG STRP IODO -A

## 2018-07-31 PROCEDURE — 74011250636 HC RX REV CODE- 250/636: Performed by: EMERGENCY MEDICINE

## 2018-07-31 PROCEDURE — 87075 CULTR BACTERIA EXCEPT BLOOD: CPT | Performed by: HOSPITALIST

## 2018-07-31 PROCEDURE — 36415 COLL VENOUS BLD VENIPUNCTURE: CPT | Performed by: INTERNAL MEDICINE

## 2018-07-31 PROCEDURE — 77030012510 HC MSK AIRWY LMA TELE -B: Performed by: ANESTHESIOLOGY

## 2018-07-31 PROCEDURE — 76060000033 HC ANESTHESIA 1 TO 1.5 HR: Performed by: ORTHOPAEDIC SURGERY

## 2018-07-31 PROCEDURE — 0JBN0ZZ EXCISION OF RIGHT LOWER LEG SUBCUTANEOUS TISSUE AND FASCIA, OPEN APPROACH: ICD-10-PCS | Performed by: ORTHOPAEDIC SURGERY

## 2018-07-31 PROCEDURE — 76010000149 HC OR TIME 1 TO 1.5 HR: Performed by: ORTHOPAEDIC SURGERY

## 2018-07-31 PROCEDURE — 74011000258 HC RX REV CODE- 258: Performed by: INTERNAL MEDICINE

## 2018-07-31 PROCEDURE — 80202 ASSAY OF VANCOMYCIN: CPT | Performed by: HOSPITALIST

## 2018-07-31 PROCEDURE — 77030013079 HC BLNKT BAIR HGGR 3M -A: Performed by: ANESTHESIOLOGY

## 2018-07-31 PROCEDURE — 74011250636 HC RX REV CODE- 250/636: Performed by: INTERNAL MEDICINE

## 2018-07-31 PROCEDURE — 77030019952 HC CANSTR VAC ASST KCON -B: Performed by: ORTHOPAEDIC SURGERY

## 2018-07-31 PROCEDURE — 74011250636 HC RX REV CODE- 250/636

## 2018-07-31 PROCEDURE — 74011250636 HC RX REV CODE- 250/636: Performed by: ORTHOPAEDIC SURGERY

## 2018-07-31 PROCEDURE — 74011000258 HC RX REV CODE- 258

## 2018-07-31 PROCEDURE — 77030011256 HC DRSG MEPILEX <16IN NO BORD MOLN -A

## 2018-07-31 PROCEDURE — 77030019895 HC PCKNG STRP IODO -A: Performed by: ORTHOPAEDIC SURGERY

## 2018-07-31 PROCEDURE — 87070 CULTURE OTHR SPECIMN AEROBIC: CPT | Performed by: INTERNAL MEDICINE

## 2018-07-31 PROCEDURE — 87075 CULTR BACTERIA EXCEPT BLOOD: CPT | Performed by: INTERNAL MEDICINE

## 2018-07-31 PROCEDURE — 87077 CULTURE AEROBIC IDENTIFY: CPT | Performed by: INTERNAL MEDICINE

## 2018-07-31 PROCEDURE — 74011250636 HC RX REV CODE- 250/636: Performed by: NURSE ANESTHETIST, CERTIFIED REGISTERED

## 2018-07-31 PROCEDURE — 77030018836 HC SOL IRR NACL ICUM -A: Performed by: ORTHOPAEDIC SURGERY

## 2018-07-31 PROCEDURE — 76210000016 HC OR PH I REC 1 TO 1.5 HR: Performed by: ORTHOPAEDIC SURGERY

## 2018-07-31 PROCEDURE — 74011250637 HC RX REV CODE- 250/637: Performed by: HOSPITALIST

## 2018-07-31 PROCEDURE — 87070 CULTURE OTHR SPECIMN AEROBIC: CPT | Performed by: HOSPITALIST

## 2018-07-31 PROCEDURE — 87186 SC STD MICRODIL/AGAR DIL: CPT | Performed by: INTERNAL MEDICINE

## 2018-07-31 PROCEDURE — 80048 BASIC METABOLIC PNL TOTAL CA: CPT | Performed by: INTERNAL MEDICINE

## 2018-07-31 PROCEDURE — 65270000029 HC RM PRIVATE

## 2018-07-31 PROCEDURE — 85025 COMPLETE CBC W/AUTO DIFF WBC: CPT | Performed by: INTERNAL MEDICINE

## 2018-07-31 PROCEDURE — 77030019934 HC DRSG VAC ASST KCON -B: Performed by: ORTHOPAEDIC SURGERY

## 2018-07-31 PROCEDURE — 87186 SC STD MICRODIL/AGAR DIL: CPT | Performed by: HOSPITALIST

## 2018-07-31 PROCEDURE — 87077 CULTURE AEROBIC IDENTIFY: CPT | Performed by: HOSPITALIST

## 2018-07-31 RX ORDER — FENTANYL CITRATE 50 UG/ML
50 INJECTION, SOLUTION INTRAMUSCULAR; INTRAVENOUS
Status: DISCONTINUED | OUTPATIENT
Start: 2018-07-31 | End: 2018-07-31 | Stop reason: HOSPADM

## 2018-07-31 RX ORDER — MAGNESIUM SULFATE 100 %
4 CRYSTALS MISCELLANEOUS AS NEEDED
Status: DISCONTINUED | OUTPATIENT
Start: 2018-07-31 | End: 2018-07-31 | Stop reason: HOSPADM

## 2018-07-31 RX ORDER — DEXTROSE 50 % IN WATER (D50W) INTRAVENOUS SYRINGE
25-50 AS NEEDED
Status: DISCONTINUED | OUTPATIENT
Start: 2018-07-31 | End: 2018-07-31 | Stop reason: HOSPADM

## 2018-07-31 RX ORDER — SODIUM CHLORIDE 9 MG/ML
125 INJECTION, SOLUTION INTRAVENOUS CONTINUOUS
Status: DISCONTINUED | OUTPATIENT
Start: 2018-07-31 | End: 2018-08-02

## 2018-07-31 RX ORDER — LIDOCAINE HYDROCHLORIDE 20 MG/ML
INJECTION, SOLUTION EPIDURAL; INFILTRATION; INTRACAUDAL; PERINEURAL AS NEEDED
Status: DISCONTINUED | OUTPATIENT
Start: 2018-07-31 | End: 2018-07-31 | Stop reason: HOSPADM

## 2018-07-31 RX ORDER — MIDAZOLAM HYDROCHLORIDE 1 MG/ML
INJECTION, SOLUTION INTRAMUSCULAR; INTRAVENOUS AS NEEDED
Status: DISCONTINUED | OUTPATIENT
Start: 2018-07-31 | End: 2018-07-31 | Stop reason: HOSPADM

## 2018-07-31 RX ORDER — ONDANSETRON 2 MG/ML
INJECTION INTRAMUSCULAR; INTRAVENOUS AS NEEDED
Status: DISCONTINUED | OUTPATIENT
Start: 2018-07-31 | End: 2018-07-31 | Stop reason: HOSPADM

## 2018-07-31 RX ORDER — OXYCODONE AND ACETAMINOPHEN 5; 325 MG/1; MG/1
1-2 TABLET ORAL
Status: DISCONTINUED | OUTPATIENT
Start: 2018-07-31 | End: 2018-07-31 | Stop reason: HOSPADM

## 2018-07-31 RX ORDER — FENTANYL CITRATE 50 UG/ML
INJECTION, SOLUTION INTRAMUSCULAR; INTRAVENOUS AS NEEDED
Status: DISCONTINUED | OUTPATIENT
Start: 2018-07-31 | End: 2018-07-31 | Stop reason: HOSPADM

## 2018-07-31 RX ORDER — SODIUM CHLORIDE, SODIUM LACTATE, POTASSIUM CHLORIDE, CALCIUM CHLORIDE 600; 310; 30; 20 MG/100ML; MG/100ML; MG/100ML; MG/100ML
INJECTION, SOLUTION INTRAVENOUS
Status: DISCONTINUED | OUTPATIENT
Start: 2018-07-31 | End: 2018-07-31 | Stop reason: HOSPADM

## 2018-07-31 RX ORDER — SODIUM CHLORIDE, SODIUM LACTATE, POTASSIUM CHLORIDE, CALCIUM CHLORIDE 600; 310; 30; 20 MG/100ML; MG/100ML; MG/100ML; MG/100ML
25 INJECTION, SOLUTION INTRAVENOUS CONTINUOUS
Status: DISCONTINUED | OUTPATIENT
Start: 2018-07-31 | End: 2018-07-31 | Stop reason: HOSPADM

## 2018-07-31 RX ORDER — SODIUM CHLORIDE 0.9 % (FLUSH) 0.9 %
5-10 SYRINGE (ML) INJECTION AS NEEDED
Status: DISCONTINUED | OUTPATIENT
Start: 2018-07-31 | End: 2018-07-31 | Stop reason: HOSPADM

## 2018-07-31 RX ORDER — PROPOFOL 10 MG/ML
INJECTION, EMULSION INTRAVENOUS AS NEEDED
Status: DISCONTINUED | OUTPATIENT
Start: 2018-07-31 | End: 2018-07-31 | Stop reason: HOSPADM

## 2018-07-31 RX ORDER — MORPHINE SULFATE 4 MG/ML
2-4 INJECTION INTRAVENOUS
Status: COMPLETED | OUTPATIENT
Start: 2018-07-31 | End: 2018-07-31

## 2018-07-31 RX ORDER — MORPHINE SULFATE 4 MG/ML
2 INJECTION INTRAVENOUS
Status: DISCONTINUED | OUTPATIENT
Start: 2018-07-31 | End: 2018-08-06 | Stop reason: HOSPADM

## 2018-07-31 RX ORDER — OXYCODONE AND ACETAMINOPHEN 10; 325 MG/1; MG/1
2 TABLET ORAL
Status: DISCONTINUED | OUTPATIENT
Start: 2018-07-31 | End: 2018-08-06 | Stop reason: HOSPADM

## 2018-07-31 RX ADMIN — FENTANYL CITRATE 50 MCG: 50 INJECTION, SOLUTION INTRAMUSCULAR; INTRAVENOUS at 18:16

## 2018-07-31 RX ADMIN — FENTANYL CITRATE 25 MCG: 50 INJECTION, SOLUTION INTRAMUSCULAR; INTRAVENOUS at 17:05

## 2018-07-31 RX ADMIN — MIDAZOLAM HYDROCHLORIDE 2 MG: 1 INJECTION, SOLUTION INTRAMUSCULAR; INTRAVENOUS at 16:33

## 2018-07-31 RX ADMIN — MORPHINE SULFATE 2 MG: 4 INJECTION INTRAVENOUS at 20:15

## 2018-07-31 RX ADMIN — FENTANYL CITRATE 25 MCG: 50 INJECTION, SOLUTION INTRAMUSCULAR; INTRAVENOUS at 16:49

## 2018-07-31 RX ADMIN — SODIUM CHLORIDE 1000 MG: 900 INJECTION, SOLUTION INTRAVENOUS at 04:48

## 2018-07-31 RX ADMIN — Medication 10 ML: at 22:55

## 2018-07-31 RX ADMIN — ONDANSETRON 4 MG: 2 INJECTION INTRAMUSCULAR; INTRAVENOUS at 16:59

## 2018-07-31 RX ADMIN — Medication 10 ML: at 05:36

## 2018-07-31 RX ADMIN — FENTANYL CITRATE 25 MCG: 50 INJECTION, SOLUTION INTRAMUSCULAR; INTRAVENOUS at 16:58

## 2018-07-31 RX ADMIN — SODIUM CHLORIDE 1000 MG: 900 INJECTION, SOLUTION INTRAVENOUS at 22:53

## 2018-07-31 RX ADMIN — MORPHINE SULFATE 4 MG: 4 INJECTION INTRAVENOUS at 17:55

## 2018-07-31 RX ADMIN — SODIUM CHLORIDE, SODIUM LACTATE, POTASSIUM CHLORIDE, CALCIUM CHLORIDE: 600; 310; 30; 20 INJECTION, SOLUTION INTRAVENOUS at 16:42

## 2018-07-31 RX ADMIN — MEROPENEM 500 MG: 500 INJECTION, POWDER, FOR SOLUTION INTRAVENOUS at 20:41

## 2018-07-31 RX ADMIN — LIDOCAINE HYDROCHLORIDE 60 MG: 20 INJECTION, SOLUTION EPIDURAL; INFILTRATION; INTRACAUDAL; PERINEURAL at 16:40

## 2018-07-31 RX ADMIN — MEROPENEM 500 MG: 500 INJECTION, POWDER, FOR SOLUTION INTRAVENOUS at 10:19

## 2018-07-31 RX ADMIN — ENOXAPARIN SODIUM 40 MG: 100 INJECTION SUBCUTANEOUS at 20:15

## 2018-07-31 RX ADMIN — FENTANYL CITRATE 25 MCG: 50 INJECTION, SOLUTION INTRAMUSCULAR; INTRAVENOUS at 17:07

## 2018-07-31 RX ADMIN — SODIUM CHLORIDE 75 ML/HR: 900 INJECTION, SOLUTION INTRAVENOUS at 04:10

## 2018-07-31 RX ADMIN — SODIUM CHLORIDE 125 ML/HR: 900 INJECTION, SOLUTION INTRAVENOUS at 13:04

## 2018-07-31 RX ADMIN — HYDROCODONE BITARTRATE AND ACETAMINOPHEN 1 TABLET: 5; 325 TABLET ORAL at 04:30

## 2018-07-31 RX ADMIN — PROPOFOL 200 MG: 10 INJECTION, EMULSION INTRAVENOUS at 16:40

## 2018-07-31 RX ADMIN — MORPHINE SULFATE 2 MG: 4 INJECTION INTRAVENOUS at 23:18

## 2018-07-31 RX ADMIN — SODIUM CHLORIDE, SODIUM LACTATE, POTASSIUM CHLORIDE, AND CALCIUM CHLORIDE 25 ML/HR: 600; 310; 30; 20 INJECTION, SOLUTION INTRAVENOUS at 17:46

## 2018-07-31 RX ADMIN — SODIUM CHLORIDE 125 ML/HR: 900 INJECTION, SOLUTION INTRAVENOUS at 20:45

## 2018-07-31 NOTE — PROGRESS NOTES
Patient is self pay and will need home health when medically cleared, will need wound care order at that time and orders if patient is to discharge with IV abx will be outpatient infusion services.

## 2018-07-31 NOTE — PROGRESS NOTES
Problem: Falls - Risk of  Goal: *Absence of Falls  Document Yoko Fall Risk and appropriate interventions in the flowsheet. Outcome: Progressing Towards Goal  Fall Risk Interventions:  Mobility Interventions: Communicate number of staff needed for ambulation/transfer, Patient to call before getting OOB, PT Consult for mobility concerns, Strengthening exercises (ROM-active/passive), Utilize walker, cane, or other assistive device         Medication Interventions: Assess postural VS orthostatic hypotension, Evaluate medications/consider consulting pharmacy, Patient to call before getting OOB, Teach patient to arise slowly    Elimination Interventions: Call light in reach, Patient to call for help with toileting needs, Toileting schedule/hourly rounds    History of Falls Interventions: Consult care management for discharge planning, Door open when patient unattended, Room close to nurse's station, Evaluate medications/consider consulting pharmacy        Problem: Pressure Injury - Risk of  Goal: *Prevention of pressure injury  Document Erick Scale and appropriate interventions in the flowsheet. Outcome: Progressing Towards Goal  Pressure Injury Interventions: Activity Interventions: Increase time out of bed, Pressure redistribution bed/mattress(bed type), PT/OT evaluation    Mobility Interventions: HOB 30 degrees or less, Pressure redistribution bed/mattress (bed type), PT/OT evaluation, Turn and reposition approx.  every two hours(pillow and wedges)    Nutrition Interventions: Document food/fluid/supplement intake    Friction and Shear Interventions: Foam dressings/transparent film/skin sealants, HOB 30 degrees or less, Sit at 90-degree angle, Transfer aides:transfer board/Carri lift/ceiling lift, Transferring/repositioning devices

## 2018-07-31 NOTE — PROGRESS NOTES
Chart accessed to assist with orthopaedic care. Noted patient to OR for surgical intervention, will round/followup post surgery.

## 2018-07-31 NOTE — PERIOP NOTES
Recd care of pt from OR via bed. Resp even and unlabored. Attached to monitor. VSS. OR, MAR and anesthesia report acknowledged. Wound vac in place right knee. Will cont to monitor. [address] REPORT:    Verbal report given to David Lozano RN (name) on Milagros Smith  being transferred to 2200 (unit) for routine post - op       Report consisted of patients Situation, Background, Assessment and   Recommendations(SBAR). Information from the following report(s) SBAR, Kardex, OR Summary, Intake/Output, MAR and Cardiac Rhythm sinus rhythm was reviewed with the receiving nurse. Lines:   Peripheral IV 07/31/18 Left Forearm (Active)   Site Assessment Clean, dry, & intact 7/31/2018  6:27 PM   Phlebitis Assessment 0 7/31/2018  6:27 PM   Infiltration Assessment 0 7/31/2018  6:27 PM   Dressing Status Clean, dry, & intact 7/31/2018  6:27 PM   Dressing Type Transparent;Tape 7/31/2018  6:27 PM   Hub Color/Line Status Green; Infusing 7/31/2018  6:27 PM   Action Taken Open ports on tubing capped 7/31/2018  5:46 PM   Alcohol Cap Used Yes 7/31/2018  6:27 PM        Opportunity for questions and clarification was provided.       Patient transported with:   [employer])

## 2018-07-31 NOTE — CONSULTS
Progress Note      Assessment:    Septic right prepatellar, no evidence of septic arthritis      PLAN:    1. To the OR today for I&D of his right prepatellar bursae. Continue abx and increase fluids to 100 in anticipation of the OR. Understanding risks and benefits of the procedure he wishes to proceed. Consent signed. Remain npo           HPI: Kipp Prader is a 65 y.o. male patient presents with right knee pain. He was kneeling last Wednesday and got stuck with a pascual nail. She developed some redness and swelling and presented to the ED where he was given po antibiotics. His symptoms worsened and he was admitted through the ED and started on antibiotics after developing drainage, fevers and chills. Past Medical History:   Diagnosis Date    Skin cancer    History reviewed. No pertinent surgical history. Prior to Admission medications    Medication Sig Start Date End Date Taking? Authorizing Provider   cephALEXin (KEFLEX) 500 mg capsule Take 1 Cap by mouth four (4) times daily for 7 days. 7/27/18 8/3/18  Jeremías Goodman DO   trimethoprim-sulfamethoxazole (BACTRIM DS, SEPTRA DS) 160-800 mg per tablet Take 1 Tab by mouth two (2) times a day for 7 days. 7/27/18 8/3/18  Jeremías Goodman DO     Allergies   Allergen Reactions    Amoxicillin Hives    Other Medication Other (comments)     No narcotics/hx addiction    Penicillins Hives     Social History     Social History    Marital status: SINGLE     Spouse name: N/A    Number of children: N/A    Years of education: N/A     Occupational History    Not on file. Social History Main Topics    Smoking status: Current Some Day Smoker    Smokeless tobacco: Current User    Alcohol use Not on file    Drug use: Not on file    Sexual activity: Not on file     Other Topics Concern    Not on file     Social History Narrative        Blood pressure 148/90, pulse 67, temperature 98 °F (36.7 °C), resp.  rate 18, height 5' 9\" (1.753 m), weight 81.2 kg (179 lb), SpO2 98 %. CBC w/Diff   Lab Results   Component Value Date/Time    WBC 7.0 07/31/2018 05:42 AM    RBC 3.67 (L) 07/31/2018 05:42 AM    HCT 33.6 (L) 07/31/2018 05:42 AM          Physical Assessment:  General: in no apparent distress   Wound: Open wound to the anterior knee just distal to the patella with purulent drainage, mild fluctuance   Extremities:  Neurovascular intact RLE. No right knee effusion or significant pain with motion. There is erythema extending from the knee down to the ankle with mild pitting edema. The knee is nontender    Dressing: Moderate purulent drainage   DVT Exam:   No exam evidence to suggest DVT. Compartments soft and NT. Radiology:   MRI right knee:   1. Soft tissue puncture wound to the anterior pretibial soft tissues at the  level of the tibial tuberosity with adjacent findings of cellulitis and  ill-defined subcutaneous phlegmon within the prepatellar soft tissues.     2. Mild reactive myositis involving the proximal anterior fibers of the tibialis  anterior muscle belly. No rim-enhancing intramuscular fluid collection.     3. No evidence of significant joint effusion or synovitis. Physiologic amount of  fluid within the knee joint.     4. No evidence of bone marrow signal abnormality to suggest osteomyelitis. Maryhelen Burkitt, PA-C  7/31/2018  Office [phone]  Cell [phone]    Pt examined. Radiographs/mri reviewed. No foreign body seen. No evidence of r knee joint infection    Plan I and d. Risks and benefits presented to pt.   He wishes to proceed    japennie

## 2018-07-31 NOTE — OP NOTES
BRIEF OPERATIVE NOTE    Date of Procedure: 7/31/2018     Preoperative Diagnosis: pre patellar bursa    Postoperative Diagnosis: * No post-op diagnosis entered *      Procedure: Procedure(s):  INCISION AND DRAINAGE RIGHT PRE PATELLAR BURSA   Placement of wound vac with one black spong    Surgeon(s) and Role:     * Silvano Wills MD - Primary    Anesthesia: General     Findings: white purulent prepatellar bursitis tracking laterally to posterior aspect of knee     Estimated Blood Loss: 25  Replaced0      [id number]        Urine0    Specimens:   ID Type Source Tests Collected by Time Destination   1 : Baldo Ventura Right Knee Tissue  CULTURE, TISSUE W GRAM STAIN, CULTURE, ANAEROBIC Silvano Wills MD 7/31/2018 1709 Microbiology        Tubes/Drains: None    Needle/sponge count:  Correct    Complications: 0

## 2018-07-31 NOTE — ANESTHESIA PREPROCEDURE EVALUATION
Anesthetic History   No history of anesthetic complications            Review of Systems / Medical History  Patient summary reviewed and pertinent labs reviewed    Pulmonary          Smoker         Neuro/Psych   Within defined limits           Cardiovascular                  Exercise tolerance: >4 METS     GI/Hepatic/Renal  Within defined limits              Endo/Other  Within defined limits          Comments: Recovering addict ~ 15 years Other Findings   Comments: Documentation of current medication  Current medications obtained, documented and obtained? YES      Risk Factors for Postoperative nausea/vomiting:       History of postoperative nausea/vomiting? NO       Female? NO       Motion sickness? NO       Intended opioid administration for postoperative analgesia? YES      Smoking Abstinence:  Current Smoker? YES  Elective Surgery? NO  Seen preoperatively by anesthesiologist or proxy prior to day of surgery? YES  Pt abstained from smoking 24 hours prior to anesthesia?  YES    Preventive care/screening for High Blood Pressure:  Aged 18 years and older: YES  Screened for high blood pressure: YES  Patients with high blood pressure referred to primary care provider   for BP management: YES                 Physical Exam    Airway  Mallampati: II  TM Distance: 4 - 6 cm  Neck ROM: normal range of motion   Mouth opening: Normal     Cardiovascular  Regular rate and rhythm,  S1 and S2 normal,  no murmur, click, rub, or gallop             Dental  No notable dental hx       Pulmonary  Breath sounds clear to auscultation               Abdominal  GI exam deferred       Other Findings            Anesthetic Plan    ASA: 2  Anesthesia type: general          Induction: Intravenous  Anesthetic plan and risks discussed with: Patient

## 2018-07-31 NOTE — ANESTHESIA POSTPROCEDURE EVALUATION
Post-Anesthesia Evaluation and Assessment    Patient: Trinidad Kern MRN: [id number]  SSN: [id number]    YOB: 1955  Age: 65 y.o. Sex: male      Data from PACU flowsheet    Cardiovascular Function/Vital Signs  Visit Vitals    BP (!) 149/95 (BP 1 Location: Right arm, BP Patient Position: At rest)    Pulse 61    Temp 36.6 °C (97.8 °F)    Resp 16    Ht 5' 9\" (1.753 m)    Wt 81.2 kg (179 lb)    SpO2 98%    BMI 26.43 kg/m2       Patient is status post general anesthesia for Procedure(s):  INCISION AND DRAINAGE RIGHT PRE PATELLAR BURSA. Nausea/Vomiting: controlled    Postoperative hydration reviewed and adequate. Pain:  Pain Scale 1: Numeric (0 - 10) (07/31/18 1816)  Pain Intensity 1: 9 (07/31/18 1816)   Managed      Mental Status and Level of Consciousness: Alert and oriented     Pulmonary Status:   O2 Device: Room air (07/31/18 1914)   Adequate oxygenation and airway patent    Complications related to anesthesia: None    Post-anesthesia assessment completed.  No concerns    Signed By: Aviva Phelps CRNA     July 31, 2018

## 2018-07-31 NOTE — OP NOTES
[facility]  OPERATIVE REPORT    Sara Rojas  MR#: [id number]: 1955  ACCOUNT #: [de-identified]   DATE OF SERVICE: 07/31/2018    PREOPERATIVE DIAGNOSIS:  Septic right prepatellar bursitis. POSTOPERATIVE DIAGNOSIS:  Septic right prepatellar bursitis. PROCEDURES PERFORMED:    1. Irrigation of the skin, subcutaneous tissue, and muscle. 2.  Placement of wound VAC, right prepatellar bursa wound. SURGEON:  COLTON Kuo MD    ASSISTANT:  Alexa Dear    ANESTHESIA:  General    FINDINGS:  Patient had approximately a centimeter hole anterior to the patellar ligament just proximal to the tibial tubercle. It was possible to express white purulent material out of this hole. The infection had tracked between the skin and the fascia posteriorly to the posterior aspect of the knee on the lateral side. There was not significant tracking medially. The infection did not enter the knee joint. PROCEDURE:  Under general anesthesia, patient placed in the supine position, right lower extremity prepped and draped in a sterile fashion. Skin and subcutaneous tissue was sharply debrided. Purulent material sent for aerobic, anaerobic, fungal, and TB culture, along with some of the soft tissue that was debrided. Debridement was carried between the subcutaneous tissue and muscle. The tract was opened between the fascia and subcutaneous tissue to the posterior aspect of the knee, and all purulent material was expressed from this. The wound was then irrigated with 1000 mL of saline, using a catheter to reach the posterior aspect of the knee. The wound was then packed with a wound VAC hooked to suction. Patient awakened, taken to recovery room in satisfactory condition without complication. ESTIMATED BLOOD LOSS:  25 mL    FLUIDS:  The patient received 600 of crystalloid fluid. SPECIMENS REMOVED:  Were pus and soft tissue for aerobic, anaerobic, fungal, and TB.   There was 1 Hemovac drain using 1 small black sponge. Needle and sponge count correct. No complications. At the time of dictation, patient not awakened sufficiently to test motor sensation. MD Jeniffer Friedman / [facility]  D: 07/31/2018 17:47     T: 07/31/2018 18:46  JOB #: [id number]  CC: COLTON Vazquez MD

## 2018-07-31 NOTE — PROGRESS NOTES
Bedside shift report received from Jasvir Chavez R.N. With hiram Chang Mask in to see patient, wound culture of right leg done by her  anerobic culture needed called [facility] and they will obtain equipment  Called [facility] again for anerobic swab   I to see patient Angel Rivero for or now  Consent signed  anerobic culture done  [address]  Given with sip of water  1900 returned from pacu wound vac to right leg at 125mm pressure  Bedside shift report given to JOSELITO Santos with hiram

## 2018-07-31 NOTE — PROGRESS NOTES
[facility]  Hospitalist Division        Inpatient Daily Progress Note    Daily progress Note    Patient: Hugo Kumar MRN: [id number]  CSN: [id number]    YOB: 1955  Age: 65 y.o. Sex: male    DOA: 7/29/2018 LOS:  LOS: 2 days                    Chief Complaint:  Cellulitis right leg    Interval History: 66 y/o male with hx of Basal Cell Skin CA presented to the ED on 7/27 for wound and redness underneath right knee. Pt stated this happened when he was painting the house, he got down on his knee and a nail punched in. Xray of knee w/o abnormality. In the ED he was prescribed Bactrim and Keflex and discharged. He presented back to the ED on7/29 for wound check- pt noticed edema and redness has progressed above his knee and down to his ankle. Lactic acid normal, blood culture collected x 1 NGTD, wound culture with moderate WBC's, no organisms seen. Pt started on IV Abx and admitted for further management of care. Wound care following. PVL RLE negative. MRI right knee soft tissue puncture wound to anterior pretibial soft tissues at level of tibial tuberosity w/ adjacent findings of cellulitis and ill-defined subcutaneous phlegmon w/in prepatellar soft tissue. Pt spiked temp of 102.4 on 7/30- UA negative, repeat blood cultures x 2 pending and CXR negative. Ortho consulted. Pt medically clear to proceed with incision and drainage with Dr. Virgilio Crawley today 7/31/2018. Assessment/Plan:     Patient Active Problem List   Diagnosis Code    Cellulitis of right leg L03.115    Infected wound T14. 8XXA, L08.9    Tobacco abuse Z72.0       A/P:  Cellulitis right leg  - seen in ED on 7/27- xray normal, d/c with Keflex and Bactrim  - presented back to ED on 7/29 for wound check, now with edema and progression of erythema  - blood culture collected 7/29 x 1 NGTD/  - wound culture with moderate WBC's, no organisms seen  - PVL RLE negative   - MRI right knee soft tissue puncture wound to anterior pretibial soft tissues at level of tibial tuberosity w/ adjacent findings of cellulitis and ill-defined subcutaneous phlegmon w/in prepatellar soft tissue  - ortho consulted - to OR today for incision and drainage  - ID consulted- continue IV abx per recommendations    Infected Wound  - received Tetanus shot on 7/27  - wound culture with moderate WBC's, no organisms seen  - wound care consult, appreciate input  - continue IV abx per ID    Fever  - temp of 102.4 on 7/30  - CXR negative  - WBC normal  - UA negative  - repeat blood cultures 7/30 pending  - repeat wound culture 7/31 pending  - ID consulted- appreciate input    Tobacco abuse  - declined nicoderm patch  - educated on cessation    Hx of Basal Cell Skin CA  - f/u outpatient    DVT Prophylaxis  - Lovenox daily      JADEN Marquez 84  Pager:  [phone]  Office:  [phone]        Subjective:      Temp yesterday of 102.4, CXR negative, blood cultures obtained, UA negative. ID and ortho consulted. No new complaints or concerns. Pt still with erythema and edema RLE. Dressing intact. Objective:      Visit Vitals    /74 (BP 1 Location: Right arm, BP Patient Position: At rest)    Pulse 75    Temp 98.1 °F (36.7 °C)    Resp 18    Ht 5' 9\" (1.753 m)    Wt 81.2 kg (179 lb)    SpO2 96%    BMI 26.43 kg/m2           Physical Exam:  General appearance: alert, cooperative, no distress, appears stated age  Head: Normocephalic, without obvious abnormality, atraumatic  Lungs: clear to auscultation bilaterally  Heart: regular rate and rhythm, S1, S2 normal, no murmur, click, rub or gallop  Abdomen: soft, non tender, non distended. Normoactive bowel sounds.    Extremities:erythema, 2+ edema RLE, DP pulse palpable, wound to anterior right knee with purulent drainage   Skin: see above  Neurologic: Grossly normal  PSY: Mood and affect normal, appropriately behaved        Intake and Output:  Current Shift:     Last three shifts:  07/29 1901 - 07/31 0700  In: 5840 [P.O.:2920; I.V.:2920]  Out: 3550 [Urine:3550]    Recent Results (from the past 24 hour(s))   URINALYSIS W/MICROSCOPIC    Collection Time: 07/30/18  8:30 PM   Result Value Ref Range    Color YELLOW      Appearance CLEAR      Specific gravity 1.021 1.005 - 1.030      pH (UA) 6.0 5.0 - 8.0      Protein 30 (A) NEG mg/dL    Glucose NEGATIVE  NEG mg/dL    Ketone NEGATIVE  NEG mg/dL    Bilirubin NEGATIVE  NEG      Blood MODERATE (A) NEG      Urobilinogen 1.0 0.2 - 1.0 EU/dL    Nitrites NEGATIVE  NEG      Leukocyte Esterase NEGATIVE  NEG      WBC 0 to 2 0 - 4 /hpf    RBC 8 to 10 0 - 5 /hpf    Epithelial cells FEW 0 - 5 /lpf    Bacteria NEGATIVE  NEG /hpf   METABOLIC PANEL, BASIC    Collection Time: 07/31/18  5:42 AM   Result Value Ref Range    Sodium 141 136 - 145 mmol/L    Potassium 3.7 3.5 - 5.5 mmol/L    Chloride 106 100 - 108 mmol/L    CO2 25 21 - 32 mmol/L    Anion gap 10 3.0 - 18 mmol/L    Glucose 118 (H) 74 - 99 mg/dL    BUN 9 7.0 - 18 MG/DL    Creatinine 1.00 0.6 - 1.3 MG/DL    BUN/Creatinine ratio 9 (L) 12 - 20      GFR est AA >60 >60 ml/min/1.73m2    GFR est non-AA >60 >60 ml/min/1.73m2    Calcium 8.2 (L) 8.5 - 10.1 MG/DL   CBC WITH AUTOMATED DIFF    Collection Time: 07/31/18  5:42 AM   Result Value Ref Range    WBC 7.0 4.6 - 13.2 K/uL    RBC 3.67 (L) 4.70 - 5.50 M/uL    HGB 11.3 (L) 13.0 - 16.0 g/dL    HCT 33.6 (L) 36.0 - 48.0 %    MCV 91.6 74.0 - 97.0 FL    MCH 30.8 24.0 - 34.0 PG    MCHC 33.6 31.0 - 37.0 g/dL    RDW 13.6 11.6 - 14.5 %    PLATELET 725 154 - 736 K/uL    MPV 8.7 (L) 9.2 - 11.8 FL    NEUTROPHILS 68 40 - 73 %    LYMPHOCYTES 18 (L) 21 - 52 %    MONOCYTES 13 (H) 3 - 10 %    EOSINOPHILS 1 0 - 5 %    BASOPHILS 0 0 - 2 %    ABS. NEUTROPHILS 4.7 1.8 - 8.0 K/UL    ABS. LYMPHOCYTES 1.3 0.9 - 3.6 K/UL    ABS. MONOCYTES 0.9 0.05 - 1.2 K/UL    ABS. EOSINOPHILS 0.1 0.0 - 0.4 K/UL    ABS.  BASOPHILS 0.0 0.0 - 0.1 K/UL    DF AUTOMATED             Lab Results   Component Value Date/Time    Glucose 118 (H) 07/31/2018 05:42 AM    Glucose 112 (H) 07/30/2018 05:05 AM    Glucose 114 (H) 07/29/2018 08:02 PM    Glucose 97 07/29/2018 01:50 PM    Glucose 109 (H) 07/27/2018 07:10 PM        Imaging:  Mri Knee Rt W Wo Cont    Result Date: 7/30/2018  Examination: MRI right knee without and with contrast. HISTORY: 65year-old patient with progressive erythema, soft tissue swelling, and pain about the right knee joint following puncture wound below the level of the knee. Erythema has spread above and below the knee joint while being on antibiotics. Evaluation for potential osteomyelitis or organized/drainable fluid collection is requested. TECHNIQUE: An MRI examination of the right knee is performed utilizing a local coil. Coronal and sagittal STIR and T1 images as well as axial T1 and T2 fat-suppressed images were obtained before the uneventful intravenous administration of 15 mL Dotarem intravenous gadolinium contrast. Postcontrast imaging was performed utilizing T1 fat-suppressed techniques and axial, coronal, and sagittal planes. COMPARISON: Radiographs of the right knee dated July 27, 2018. FINDINGS: In keeping with the provided history, there is a subcutaneous puncture wound at the level of the tibial tuberosity with considerable associated adjacent skin thickening and enhancement. Contiguous with puncture wound, there is a ill-defined fluid collection within the subcutaneous tissues of the anterior leg which measures approximately 10 cm in craniocaudal span, approximately 5 mm in anterior-posterior span, and approximately 5 cm in medial-lateral span. There is focal area of dephasing artifact present within the proximal medial right leg soft tissues, below the level of the knee joint, in keeping with punctate radiodensity noted on preceding radiographs.  There is mild intrinsic muscular edema and enhancement noted within the anterior superior portion of the tibialis anterior muscle belly. No evidence of organized or drainable intramuscular fluid collection. Evaluation of the bone marrow signal demonstrates no evidence of T1 marrow hypointensity to suggest osteomyelitis. Pertinently, no evidence of joint effusion or synovitis is present. Physiologic amount of fluid present within the knee joint. No fracture, stress fracture, or stress reaction. The extensor mechanism is intact. Quadriceps and patellar tendons are normal in appearance. Although not performed with a protocol for evaluation of internal derangement, the anterior and posterior cruciate ligaments appear grossly intact. Bilateral collateral ligaments are similarly intact. No evidence of Baker's cyst formation. Neurovascular bundles normal in appearance. IMPRESSION: 1. Soft tissue puncture wound to the anterior pretibial soft tissues at the level of the tibial tuberosity with adjacent findings of cellulitis and ill-defined subcutaneous phlegmon within the prepatellar soft tissues. 2. Mild reactive myositis involving the proximal anterior fibers of the tibialis anterior muscle belly. No rim-enhancing intramuscular fluid collection. 3. No evidence of significant joint effusion or synovitis. Physiologic amount of fluid within the knee joint. 4. No evidence of bone marrow signal abnormality to suggest osteomyelitis. Xr Chest Port    Result Date: 7/31/2018  CHEST AP PORTABLE Indication: Fever. Comparison: None. Findings: The lungs appear clear of infiltrates. Minimal discoid opacity at the left costophrenic angle, likely subsegmental atelectasis or scarring. No evidence for pneumothorax or pleural effusion. Cardiac silhouette and pulmonary vascularity appear within normal limits. IMPRESSION: No acute cardiopulmonary disease. Minimal left basilar subsegmental atelectasis or scarring.       Medication List Reviewed:  Current Facility-Administered Medications   Medication Dose Route Frequency    HYDROcodone-acetaminophen (NORCO) 5-325 mg per tablet 1 Tab  1 Tab Oral Q6H PRN    levoFLOXacin (LEVAQUIN) 750 mg in D5W IVPB  750 mg IntraVENous Q24H    VANCOMYCIN INFORMATION NOTE 1 Each  1 Each Other Rx Dosing/Monitoring    vancomycin (VANCOCIN) 1,000 mg in 0.9% sodium chloride (MBP/ADV) 250 mL adv  1,000 mg IntraVENous Q12H    VANCOMYCIN INFORMATION NOTE   Other ONCE    sodium chloride (NS) flush 5-10 mL  5-10 mL IntraVENous Q8H    sodium chloride (NS) flush 5-10 mL  5-10 mL IntraVENous PRN    enoxaparin (LOVENOX) injection 40 mg  40 mg SubCUTAneous Q24H    0.9% sodium chloride infusion  75 mL/hr IntraVENous CONTINUOUS

## 2018-07-31 NOTE — INTERDISCIPLINARY ROUNDS
IDR Summary      Patient: Tommy Cardenas MRN: [id number]    Age: 65 y.o.  : 1955     Admit Diagnosis: Cellulitis of right leg      POD #: N/A     DIET status: Regular     Lines/Tubes:   IV: YES   Needed: YES  Moon: NO  Needed:NO  Central Line: NO Needed: NO      VTE Prophylaxis: Chemical    Mobility needs: No     PT ordered:  NO PT eval on chart: NO    OT ordered:  NO OT eval on chart: NO      ST ordered:  NO ST eval on chart:  NO     Disposition/Care Management:  Discharge plan: Home with [address] [place] [address] ordered? NO     Recommended DME from PT/OT:      DME ordered? NO     SNF- has patient been matched? NO    Accepting bed? NO   Does patient require insurance auth?   NO      Barriers to discharge:   Financial concerns:No   PCP: None    : NO  Interventions:       LOS: 2 days     Expected days until discharge: TBD           Signed:     KRISTYN Mills  [address]  Hospitalist Division  Pager:  [phone]  Office:  [phone]

## 2018-07-31 NOTE — CONSULTS
INFECTIOUS DISEASE CONSULT NOTE       Requested by: Dr Sunita Carvalho    Reason for consult: Fever, leg wound    Date of admission: 7/29/2018    Date of consult: July 31, 2018      ABX:     Current abx Prior abx    Vanco 7/29-2, Meropenem 7/31-0 Levaquin 7.29-2     ASSESSMENT: -- RECOMMENDATIONS      Right leg wound with cellulitis- started with puncture nail wound over a week ago  - suspect nail was old and pascual and infected  - pt s/p Tetanus shot in ED 7/27  - initial wd cx and blcx neg - overall edema better but wd with increased purulent drainage and not good appearance  - wd close to knee but MRI not s/o knee joint involvement   - will broaden Abx. Continue on Vanco and start Meropenem to provide broader coverage including anaerobe and better Pseudomonas coverage  - will repeat wd cx today- aerobic and anaerobic  - neg PVL for DVT  - continue LWC and packing- appreciate wd care eval but suspect will need Sx input and I&D as with phlegmon on MRI  - check inflammatory markers  - d/w Dr Kyle Kauffman fever 7/30- suspect from leg itself - blcx IP  - WBC normal  - monitor on new abx   H/o basal cell ca    Smoker - Counseled on cessation   Ax- PCN- >30 years ago - tolerated keflex as out pt  - monitor on meropenem                 MICROBIOLOGY:     7/29 Wd cx neg   Blcx x1 neg  7/30 Blcx x2 IP   Ucx IP    LINES/DRAINS:     PIV    HPI:     Mr David Mercedes is a 64 y/o W male with no sig PMH presented to the ED on 7/27 for r4ight leg wound. He says works as  and he was painting a house last week and got down on his knee and a nail punched in. Nail was old and pascual. He says didn't do anything for few days but then was painful so went to ED 7/27 and Xray of knee was done and was reported normal. He was given tetanus shot and prescribed Bactrim and Keflex and discharged. He says was not improving.  He went back to the ED on 7/29 for wound check and was found with worsening edema and redness. He now had involvement of knee and ankle and wd was draining. His WBC was normal, he was afebrile. Blood and wd cx were drawn and he was started on Vanco and Levaquin. Wound care saw the pt and packed the wd with idoform. He continued to have pain in back of his knee and PVL was done and reported neg. Pt spiked a fever yesterday and repeat blcx were drawn along with urine cx. MRI right knee was s/o cellulitis and phlegmon. We were asked for further eval and management. Past medical history:     Past Medical History:   Diagnosis Date    Skin cancer        Social History:     Social History     Social History    Marital status: SINGLE     Spouse name: N/A    Number of children: N/A    Years of education: N/A     Occupational History    Not on file. Social History Main Topics    Smoking status: Current Some Day Smoker    Smokeless tobacco: Current User    Alcohol use Not on file    Drug use: Not on file    Sexual activity: Not on file     Other Topics Concern    Not on file     Social History Narrative       Family History:   History reviewed. No pertinent family history. Allergies: Allergies   Allergen Reactions    Amoxicillin Hives    Other Medication Other (comments)     No narcotics/hx addiction    Penicillins Hives         Home Medications:     Prescriptions Prior to Admission   Medication Sig    cephALEXin (KEFLEX) 500 mg capsule Take 1 Cap by mouth four (4) times daily for 7 days.  trimethoprim-sulfamethoxazole (BACTRIM DS, SEPTRA DS) 160-800 mg per tablet Take 1 Tab by mouth two (2) times a day for 7 days.        Current Medications:     Current Facility-Administered Medications   Medication Dose Route Frequency    HYDROcodone-acetaminophen (NORCO) 5-325 mg per tablet 1 Tab  1 Tab Oral Q6H PRN    levoFLOXacin (LEVAQUIN) 750 mg in D5W IVPB  750 mg IntraVENous Q24H    VANCOMYCIN INFORMATION NOTE 1 Each  1 Each Other Rx Dosing/Monitoring    vancomycin (VANCOCIN) 1,000 mg in 0.9% sodium chloride (MBP/ADV) 250 mL adv  1,000 mg IntraVENous Q12H    VANCOMYCIN INFORMATION NOTE   Other ONCE    sodium chloride (NS) flush 5-10 mL  5-10 mL IntraVENous Q8H    sodium chloride (NS) flush 5-10 mL  5-10 mL IntraVENous PRN    enoxaparin (LOVENOX) injection 40 mg  40 mg SubCUTAneous Q24H    0.9% sodium chloride infusion  75 mL/hr IntraVENous CONTINUOUS       Review of Systems:   12 points ROS done. Pertinent positives and negatives are as follows, ROS otherwise negative. Constitutional: +ve for fever, no chills, diaphoresis and unexpected weight change. HENT: Negative for ear pain, congestion, sore throat, rhinorrhea. Eyes: Negative for pain, redness and visual disturbance. Respiratory: negative for shortness of breath, cough, chest tightness, wheezing. Cardiovascular: Negative for chest pain, palpitations and leg swelling. Gastrointestinal: Negative for nausea, vomiting, abdominal pain or diarrhea  Genitourinary: Negative for dysuria   Musculoskeletal: +ve for rt leg pain and swelling along with ankle  Skin:+ve for rt leg ulcers, rash  Neurological: Negative for dizziness, syncope, light-headedness or headaches. Hematological:Negative for easy bruising or bleeding. Psychiatric/Behavioral: Negative anxiety, depression. Physical Exam:  Vitals  Temp (24hrs), Av.5 °F (37.5 °C), Min:98.1 °F (36.7 °C), Max:102.4 °F (39.1 °C)    Visit Vitals    /74 (BP 1 Location: Right arm, BP Patient Position: At rest)    Pulse 75    Temp 98.1 °F (36.7 °C)    Resp 18    Ht 5' 9\" (1.753 m)    Wt 81.2 kg (179 lb)    SpO2 96%    BMI 26.43 kg/m2       General: Well-developed, 65y.o. year-old, male, in no acute distress  HEENT: Normocephalic, anicteric sclerae, Pupils equal, round reactive to light, no oropharyngeal lesions. No sinus tenderness.   Neck: Supple, no lymphadenopathy, masses or thyromegaly  Chest: Symmetrical expansion  Lungs: Clear to auscultation bilaterally, no dullness  Heart: Regular rhythm, no murmurs, rubs or gallops, No JVD  Abdomen: Soft, non-tender,non distended, no organomegaly, BS+  Musculoskeletal: Rt leg ant tibial area swollen, ulcer present with purulent fluid draining, tender, no foul smell, surrounding edema and redness+, extending to ankle and lower thigh. Knee with no effusion and able to do ROM. Calf tenderness+. Left leg with no abnormality. CNS: AAOx3. Cranial nerves II-XII intact. Grossly normal.No NR  SKIN: No other skin lesion or rash. Dry, warm, intact          Labs: Results:   Chemistry Recent Labs      07/31/18   0542  07/30/18   0505  07/29/18 2002   GLU  118*  112*  114*   NA  141  140  140   K  3.7  3.9  3.9   CL  106  107  105   CO2  25  25  28   BUN  9  10  15   CREA  1.00  0.85  1.07   CA  8.2*  8.2*  8.1*   AGAP  10  8  7   BUCR  9*  12  14      CBC w/Diff Recent Labs      07/31/18   0542  07/30/18   0505  07/29/18 2002   WBC  7.0  6.6  10.5   RBC  3.67*  3.67*  3.87*   HGB  11.3*  11.2*  12.1*   HCT  33.6*  33.6*  35.9*   PLT  220  178  204   GRANS  68  77*  82*   LYMPH  18*  10*  7*   EOS  1  1  1      Microbiology Recent Labs      07/29/18 2002 07/29/18   1830   CULT  NO GROWTH AFTER 11 HOURS  PENDING          Imaging-      Results from Hospital Encounter encounter on 07/29/18   XR CHEST PORT   Narrative CHEST AP PORTABLE    Indication: Fever. Comparison: None. Findings: The lungs appear clear of infiltrates. Minimal discoid opacity at the  left costophrenic angle, likely subsegmental atelectasis or scarring. No  evidence for pneumothorax or pleural effusion. Cardiac silhouette and pulmonary  vascularity appear within normal limits. Impression IMPRESSION:    No acute cardiopulmonary disease. Minimal left basilar subsegmental atelectasis  or scarring.         No results found for this or any previous visit.    ---------------------------------------------------------------------------------------------------------------  I have independently examined the patient and reviewed all lab studies and imgaing as well as review of nursing notes and physican notes from the past 24 hours. The plan of care has been discussed with the patient/relative and all questions are answered. Dragon medical dictation software was used for portions of this report. Unintended errors may occur.      Sophia Trimble MD  7/31/2018    [facility] THE [facility]  [phone]

## 2018-08-01 LAB
BACTERIA SPEC CULT: ABNORMAL
BACTERIA SPEC CULT: NORMAL
CRP SERPL-MCNC: 12 MG/DL (ref 0–0.3)
DATE LAST DOSE: ABNORMAL
ERYTHROCYTE [SEDIMENTATION RATE] IN BLOOD: 96 MM/HR (ref 0–20)
GRAM STN SPEC: ABNORMAL
GRAM STN SPEC: ABNORMAL
REPORTED DOSE/TIME,TMG: 1700
SERVICE CMNT-IMP: ABNORMAL
SERVICE CMNT-IMP: NORMAL
VANCOMYCIN TROUGH SERPL-MCNC: 8 UG/ML (ref 10–20)

## 2018-08-01 PROCEDURE — 65270000029 HC RM PRIVATE

## 2018-08-01 PROCEDURE — 74011250636 HC RX REV CODE- 250/636: Performed by: EMERGENCY MEDICINE

## 2018-08-01 PROCEDURE — 80202 ASSAY OF VANCOMYCIN: CPT | Performed by: PHYSICIAN ASSISTANT

## 2018-08-01 PROCEDURE — 97161 PT EVAL LOW COMPLEX 20 MIN: CPT

## 2018-08-01 PROCEDURE — 86140 C-REACTIVE PROTEIN: CPT | Performed by: INTERNAL MEDICINE

## 2018-08-01 PROCEDURE — 85652 RBC SED RATE AUTOMATED: CPT | Performed by: INTERNAL MEDICINE

## 2018-08-01 PROCEDURE — 36415 COLL VENOUS BLD VENIPUNCTURE: CPT | Performed by: INTERNAL MEDICINE

## 2018-08-01 PROCEDURE — 74011250636 HC RX REV CODE- 250/636: Performed by: ORTHOPAEDIC SURGERY

## 2018-08-01 PROCEDURE — 97530 THERAPEUTIC ACTIVITIES: CPT

## 2018-08-01 PROCEDURE — 74011250636 HC RX REV CODE- 250/636: Performed by: INTERNAL MEDICINE

## 2018-08-01 PROCEDURE — 74011250637 HC RX REV CODE- 250/637: Performed by: ORTHOPAEDIC SURGERY

## 2018-08-01 PROCEDURE — 74011250636 HC RX REV CODE- 250/636: Performed by: HOSPITALIST

## 2018-08-01 PROCEDURE — 74011000258 HC RX REV CODE- 258: Performed by: INTERNAL MEDICINE

## 2018-08-01 RX ADMIN — OXYCODONE HYDROCHLORIDE AND ACETAMINOPHEN 2 TABLET: 10; 325 TABLET ORAL at 19:15

## 2018-08-01 RX ADMIN — MORPHINE SULFATE 2 MG: 4 INJECTION INTRAVENOUS at 11:27

## 2018-08-01 RX ADMIN — SODIUM CHLORIDE 125 ML/HR: 900 INJECTION, SOLUTION INTRAVENOUS at 09:23

## 2018-08-01 RX ADMIN — MEROPENEM 500 MG: 500 INJECTION, POWDER, FOR SOLUTION INTRAVENOUS at 21:03

## 2018-08-01 RX ADMIN — Medication 10 ML: at 21:04

## 2018-08-01 RX ADMIN — OXYCODONE HYDROCHLORIDE AND ACETAMINOPHEN 2 TABLET: 10; 325 TABLET ORAL at 13:44

## 2018-08-01 RX ADMIN — SODIUM CHLORIDE 125 ML/HR: 900 INJECTION, SOLUTION INTRAVENOUS at 21:03

## 2018-08-01 RX ADMIN — MEROPENEM 500 MG: 500 INJECTION, POWDER, FOR SOLUTION INTRAVENOUS at 05:19

## 2018-08-01 RX ADMIN — Medication 10 ML: at 05:19

## 2018-08-01 RX ADMIN — MORPHINE SULFATE 2 MG: 4 INJECTION INTRAVENOUS at 21:13

## 2018-08-01 RX ADMIN — MEROPENEM 500 MG: 500 INJECTION, POWDER, FOR SOLUTION INTRAVENOUS at 13:44

## 2018-08-01 RX ADMIN — ENOXAPARIN SODIUM 40 MG: 100 INJECTION SUBCUTANEOUS at 21:04

## 2018-08-01 RX ADMIN — SODIUM CHLORIDE 1000 MG: 900 INJECTION, SOLUTION INTRAVENOUS at 11:31

## 2018-08-01 RX ADMIN — MORPHINE SULFATE 2 MG: 4 INJECTION INTRAVENOUS at 07:07

## 2018-08-01 RX ADMIN — SODIUM CHLORIDE 1000 MG: 900 INJECTION, SOLUTION INTRAVENOUS at 22:52

## 2018-08-01 RX ADMIN — MORPHINE SULFATE 2 MG: 4 INJECTION INTRAVENOUS at 03:30

## 2018-08-01 NOTE — PROGRESS NOTES
1930 - Assumed pt care from Ashwin Bailey, [facility]. Pt in bed, alert and oriented x 4. Not in any form of distress. Denies pain. Frequent use items and call bell within reach. Verbalized understanding to call for assistance. Bed locked in lowest position. 2000 - Called and verified the plan of administration of Vancomycin with pharmacy. Continue with vanc trough draw then resume if trough is less than 20.     2015 - Phlebotomist seen in pt's room to draw Vanc trough. 2100 - No result of vanc trough posted. 2253 - Vancomycin 1,000 mg administered. vanc trough 6.    0715 - Bedside and Verbal shift change report given to Elle Day RN (oncoming nurse) by Justino Duran RN (offgoing nurse). Report included the following information SBAR, Kardex, Intake/Output, MAR and Recent Results.

## 2018-08-01 NOTE — PROGRESS NOTES
Orthopaedics    Patient without new complaints status post INCISION AND DRAINAGE LOWER EXTREMITY for Cellulitis of right leg  pre patellar bursa 7/29/2018. About the same as preop as regards pain about the r knee. Wound vac output minimal    Visit Vitals    /78 (BP 1 Location: Left arm, BP Patient Position: At rest)    Pulse 70    Temp 98.3 °F (36.8 °C)    Resp 16    Ht 5' 9\" (1.753 m)    Wt 81.2 kg (179 lb)    SpO2 97%    BMI 26.43 kg/m2       CBC w/Diff    Lab Results   Component Value Date/Time    WBC 7.0 07/31/2018 05:42 AM    RBC 3.67 (L) 07/31/2018 05:42 AM    HCT 33.6 (L) 07/31/2018 05:42 AM    MCV 91.6 07/31/2018 05:42 AM    MCH 30.8 07/31/2018 05:42 AM    MCHC 33.6 07/31/2018 05:42 AM    RDW 13.6 07/31/2018 05:42 AM    Lab Results   Component Value Date/Time    MONOS 13 (H) 07/31/2018 05:42 AM    EOS 1 07/31/2018 05:42 AM    BASOS 0 07/31/2018 05:42 AM    RDW 13.6 07/31/2018 05:42 AM          Physical exam:  Wound vac satisfactory  AT, GS intact  Bilateral Lower Extremities. jamie present l calf    Gm stain and early culture  Many gm positive cocci in pairs. Assessment:  Status post INCISION AND DRAINAGE LOWER EXTREMITY for Cellulitis of right leg  pre patellar bursa 7/29/2018, stable      PLAN: antibiotics as per ID. Will have pt lay prone to facilitate drainage.   Consult wound care  Nelly Cheek MD  August 1, 2018

## 2018-08-01 NOTE — PROGRESS NOTES
INFECTIOUS DISEASE FOLLOW UP NOTE :-     Admit Date: 7/29/2018    ABX;      Current  Prior    Vanco 7/29-3, Meropenem 7/31-1 Levaquin 7.29-2     ASSESSMENT: -> RECS     Right leg wound with cellulitis MSSA with prepatellar purulent bursitis- started with puncture nail wound over a week ago  - suspect nail was old and pascual and infected  - pt s/p Tetanus shot in ED 7/27  - wound cx - MSSA, Blcx ntd  - MRI knee neg for septic arthritis, PVLs neg  - s/p I and D of prepatellar bursa and fascia 7/31 by Dr Qing Blackman   - ESR/CRP 96/12 - pt s/p surgery and drainage of abscess   - f/up final OR cx , if again MSSA then can switch abx to either Po Clindamycin or Doxy to finish total 14 days. - d/w Medicine team   New fever 7/30- suspect from un drained abscess - blcx ntd  - WBC normal  - monitor on abx   H/o basal cell ca     Smoker - Counseled on cessation   Ax- PCN- >30 years ago - tolerated keflex as out pt  - monitor on meropenem     MICROBIOLOGY:   7/29               Wd cx - MSSA                         Blcx x1 neg  7/30               Blcx x2 ntd                         Ucx neg  7/31               Wound cx - many SA                        Anaerobic cx ntd                         Tissue cx - many gpc    LINES AND CATHETERS:   PIV    SUBJECTIVE :     Interval notes reviewed. Pt is feeling sleepy as he got morphine before my visit, says had surgery yesterday on his knee and is feeling okay after that. Denies any diarrhea, no fever.     MEDICATIONS :     Current Facility-Administered Medications   Medication Dose Route Frequency    meropenem (MERREM) 500 mg in 0.9% sodium chloride (MBP/ADV) 50 mL MBP  500 mg IntraVENous Q8H    0.9% sodium chloride infusion  125 mL/hr IntraVENous CONTINUOUS    morphine injection 2 mg  2 mg IntraVENous Q3H PRN    oxyCODONE-acetaminophen (PERCOCET 10)  mg per tablet 2 Tab  2 Tab Oral Q6H PRN    VANCOMYCIN INFORMATION NOTE   Other ONCE    VANCOMYCIN INFORMATION NOTE 1 Each  1 Each Other Rx Dosing/Monitoring    vancomycin (VANCOCIN) 1,000 mg in 0.9% sodium chloride (MBP/ADV) 250 mL adv  1,000 mg IntraVENous Q12H    sodium chloride (NS) flush 5-10 mL  5-10 mL IntraVENous Q8H    sodium chloride (NS) flush 5-10 mL  5-10 mL IntraVENous PRN    enoxaparin (LOVENOX) injection 40 mg  40 mg SubCUTAneous Q24H       OBJECTIVE :     Visit Vitals    /78 (BP 1 Location: Left arm, BP Patient Position: At rest)    Pulse 70    Temp 98.3 °F (36.8 °C)    Resp 16    Ht 5' 9\" (1.753 m)    Wt 81.2 kg (179 lb)    SpO2 97%    BMI 26.43 kg/m2       Temp (24hrs), Av °F (36.7 °C), Min:97.8 °F (36.6 °C), Max:98.3 °F (36.8 °C)    General: Well-developed, 65y.o. year-old, male, in no acute distress  HEENT: Normocephalic, anicteric sclerae, Pupils equal, round reactive to light, no oropharyngeal lesions. Neck: Supple, no lymphadenopathy, masses or thyromegaly  Chest: Symmetrical expansion  Lungs: Clear to auscultation bilaterally, no dullness  Heart: Regular rhythm, no murmurs, rubs or gallops, No JVD  Abdomen: Soft, non-tender,non distended, no organomegaly, BS+  Musculoskeletal: Rt knee area with wound vac present, surrounding area of knee swollen, no erythema currently. CNS: AAOx3. Cranial nerves II-XII intact.  Grossly normal.No NR    Labs: Results:   Chemistry Recent Labs      18   0542  18   0505  18   GLU  118*  112*  114*   NA  141  140  140   K  3.7  3.9  3.9   CL  106  107  105   CO2  25  25  28   BUN  9  10  15   CREA  1.00  0.85  1.07   CA  8.2*  8.2*  8.1*   AGAP  10  8  7   BUCR  9*  12  14      CBC w/Diff Recent Labs      18   0542  18   0505  18   WBC  7.0  6.6  10.5   RBC  3.67*  3.67*  3.87*   HGB  11.3*  11.2*  12.1*   HCT  33.6*  33.6*  35.9*   PLT  220  178  204   GRANS  68  77*  82*   LYMPH  18*  10*  7*   EOS  1  1  1 RADIOLOGY :      MRI KNEE 7/30 - IMPRESSION:     1. Soft tissue puncture wound to the anterior pretibial soft tissues at the  level of the tibial tuberosity with adjacent findings of cellulitis and  ill-defined subcutaneous phlegmon within the prepatellar soft tissues.     2. Mild reactive myositis involving the proximal anterior fibers of the tibialis  anterior muscle belly. No rim-enhancing intramuscular fluid collection.     3. No evidence of significant joint effusion or synovitis. Physiologic amount of  fluid within the knee joint.     4. No evidence of bone marrow signal abnormality to suggest osteomyelitis.       Carolene Opitz, MD  August 1, 2018  [facility] AT THE [facility]  [phone]

## 2018-08-01 NOTE — PROGRESS NOTES
Problem: Falls - Risk of  Goal: *Absence of Falls  Document Yoko Fall Risk and appropriate interventions in the flowsheet. Outcome: Progressing Towards Goal  Fall Risk Interventions:  Mobility Interventions: Patient to call before getting OOB         Medication Interventions: Evaluate medications/consider consulting pharmacy    Elimination Interventions: Patient to call for help with toileting needs    History of Falls Interventions: Door open when patient unattended        Problem: Pressure Injury - Risk of  Goal: *Prevention of pressure injury  Document Erick Scale and appropriate interventions in the flowsheet. Outcome: Progressing Towards Goal  Pressure Injury Interventions:             Activity Interventions: Increase time out of bed    Mobility Interventions: HOB 30 degrees or less    Nutrition Interventions: Document food/fluid/supplement intake    Friction and Shear Interventions: Foam dressings/transparent film/skin sealants

## 2018-08-01 NOTE — PROGRESS NOTES
Problem: Mobility Impaired (Adult and Pediatric)  Goal: *Acute Goals and Plan of Care (Insert Text)  Physical Therapy Goals   Initiated 8/1/2018 and to be accomplished within 7 days. 1.  Patient will ambulate 300 feet with modified independence using LRAD in order to prepare for safe negotiation of their environment. 2.  Patient will ascend/descend 1 steps without handrail use with modified independence in order to prepare for safe exit/entry into their home environment. Outcome: Progressing Towards Goal  PHYSICAL THERAPY: Initial Assessment   INPATIENT: Self-pay: Hospital Day: 4     Patient: Tommy Cardenas (61 y.o. male)    Date: 8/1/2018  Primary Diagnosis: Cellulitis of right leg  pre patellar bursa   Procedure(s) (LRB):  INCISION AND DRAINAGE RIGHT PRE PATELLAR BURSA (Right), 1 Day Post-Op   Precautions:  Wound Vac on R       PLOF: I with ADLs and ambulation     ASSESSMENT :  Patient sitting EOB, agreeable to participation with PT. Reports that he lives in a 1 story home. MOD I for sit <> stand. Supervision for ambulation x 20 feet in room environment; patient able to manage IV pole and wound vac. Good standing balance with toileting tasks. Patient returns to sitting EOB. Visitors enter room. Patient states \"it's throbbing but it's been that way\" when asked about R knee pain. Educated on purpose of PT and safety with gait. Plan to see for 1 - 2 visits to maximize safety with mobility. Recommend d/c home without need for skilled PT services. Patient appears to be close to baseline with mobility but would benefit from 1 - 2 sessions to maximize safety with managing wound vac. Patient presents with deficits in:  Gait and Stairs    Patient will benefit from skilled intervention to address the above impairments.   Patients rehabilitation potential is considered to be Good  Factors which may influence rehabilitation potential include:   []         None noted  []         Mental ability/status  [x] Medical condition  []         Home/family situation and support systems  []         Safety awareness  [x]         Pain tolerance/management  []         Other:      PLAN :  Recommendations and Planned Interventions:  [x]           Bed Mobility Training             [x]    Neuromuscular Re-Education  [x]           Transfer Training                   []    Orthotic/Prosthetic Training  [x]           Gait Training                          []    Modalities  [x]           Therapeutic Exercises          []    Edema Management/Control  [x]           Therapeutic Activities            [x]    Patient and Family Training/Education  []           Other (comment):      EDUCATION:   Education:  Patient was educated on the following topics: Educated on purpose of PT and safety with gait. Barriers to Learning/Limitations: None  Compensate with: visual, verbal, tactile, kinesthetic cues/model    Recommendations for the next treatment session: Gait and stair training   Frequency/Duration: Patient will be followed by physical therapy 3 -  5 times a week to address goals. Discharge Recommendations: None  Further Equipment Recommendations for Discharge: N/A  Factors which may impact discharge planning: None     SUBJECTIVE:   Patient stated I've been getting up and walking to the bathroom. They need something to make it easier to take this vac.     OBJECTIVE DATA SUMMARY:     Past Medical History:   Diagnosis Date    Skin cancer    History reviewed. No pertinent surgical history.       Eval Complexity: History: MEDIUM  Complexity : 1-2 comorbidities / personal factors will impact the outcome/ POC Exam:MEDIUM Complexity : 3 Standardized tests and measures addressing body structure, function, activity limitation and / or participation in recreation  Presentation: LOW Complexity : Stable, uncomplicated  Clinical Decision Making:Low Complexity [facility] Balance Scale 5/5 Overall Complexity:LOW     G CODES:Mobility  Current  CH= 0%   Goal  CH= 0%. The severity rating is based on the Other [facility] Balance Scale 5/5    [facility] Balance Scale 5/5  0: Pt performs 25% or less of standing activity (Max assist) CN, 100% impaired. 1: Pt supports self with upper extremities but requires therapist assistance. Pt performs 25-50% of effort (Mod assist) CM, 80% to <100% impaired. 1+: Pt supports self with upper extremities but requires therapist assistance. Pt performs >50% effort. (Min assist). CL, 60% to <80% impaired. 2: Pt supports self independently with both upper extremities (walker, crutches, parallel bars). CL, 60% to <80% impaired. 2+: Pt support self independently with 1 upper extremity (cane, crutch, 1 parallel bar). CK, 40% to <60% impaired. 3: Pt stands without upper extremity support for up to 30 seconds. CK, 40% to <60% impaired. 3+: Pt stands without upper extremity support for 30 seconds or greater. CJ, 20% to <40% impaired. 4: Pt independently moves and returns center of gravity 1-2 inches in one plane. CJ, 20% to <40% impaired. 4+: Pt independently moves and returns center of gravity 1-2 inches in multiple planes. CI, 1% to <20% impaired. 5: Pt independently moves and returns center of gravity in all planes greater than 2 inches. CH, 0% impaired. Prior Level of Function/Home Situation:   Home Situation  Home Environment: Private residence  One/Two Story Residence: One story  Living Alone: Yes  Support Systems: Friends \ neighbors, Family member(s)  Patient Expects to be Discharged to[de-identified] Private residence  Current DME Used/Available at Home: None  Critical Behavior:  Neurologic State: Alert  Orientation Level: Oriented X4  Cognition: Follows commands  Safety/Judgement: Fall prevention; Awareness of environment  Psychosocial  Patient Behaviors: Calm; Cooperative  Purposeful Interaction: Yes  Pt Identified Daily Priority: Clinical issues (comment)     Tone : normal  Sensation: NT  Range Of Motion: WFL    Functional Mobility:      Functional Status      Indep   (I)   Mod I   Super-vision   Min A   Mod A   Max A   Total A   Assist x2 Verbal cues Additional time Not tested   Comments   Rolling []  []  [] []    []    []  []  [] [] [] []    Supine to sit []  []  [] []  []  []  []  [] [] [] []    Sit to supine []  []  [] []  []  []  []  [] [] [] []    Sit to stand []  [x]  [] []  []  []  []  [] [] [] []    Stand to sit []  [x]  [] []  []  []  []  [] [] [] []    Bed to chair transfers []  []  [] []  []  []  []  [] [] [] []        Balance    Good   Fair   Poor   Unable   Not tested   Comments   Sitting static [x]  []  []  []  []    Sitting dynamic [x]  []  []  []  []    Standing static [x]  []  []  []  []    Standing dynamic [x]  []  []  []  []        Mobility/Gait:   Level of Assistance: Supervision  Assistive Device: None  Distance Ambulated: 20 feet     Left Lower Extremity: FWB  Right Lower Extremity: FWB  Base of Support: [facility]  Speed/Nasima: pace decreased (<100 feet/min)  Step Length: left shortened  Swing Pattern: right asymmetrical  Stance: right decreased  Gait Abnormalities: antalgic    Pain: R knee throbbing, no rating given     Vital Signs  Temp: 98.4 °F (36.9 °C)     Pulse (Heart Rate): 72     BP: 133/85     Resp Rate: 16     O2 Sat (%): 95 %    Activity Tolerance:   Good     Please refer to the flowsheet for vital signs taken during this treatment. After treatment:   []         Patient left in no apparent distress sitting up in chair  [x]         Patient left in no apparent distress sitting edge of bed  [x]         Call bell left within reach  [x]         Nursing notified  []         Caregiver present  []         Bed alarm activated    COMMUNICATION/EDUCATION:   [x]         Fall prevention education was provided and the patient/caregiver indicated understanding. [x]         Patient/family have participated as able in goal setting and plan of care.   [x] Patient/family agree to work toward stated goals and plan of care. []         Patient understands intent and goals of therapy, but is neutral about his/her participation. []         Patient is unable to participate in goal setting and plan of care.     Thank you for this referral.  Haider Echavarria   Time Calculation: 19 mins

## 2018-08-01 NOTE — PROGRESS NOTES
Patient is self pay and will need home health when medically cleared, will need wound care order at that time and orders if patient is to discharge with IV abx will be outpatient infusion services. Currently patient on a wound vac if patient is to go home with wound vac indigent paperwork will have to be completed and approved.

## 2018-08-01 NOTE — PROGRESS NOTES
[facility]  Hospitalist Division        Inpatient Daily Progress Note    Daily progress Note    Patient: Trinidad Kern MRN: [id number]  CSN: [id number]    YOB: 1955  Age: 65 y.o. Sex: male    DOA: 7/29/2018 LOS:  LOS: 3 days                    Chief Complaint:  Cellulitis right leg    Interval History: 66 y/o male with hx of Basal Cell Skin CA presented to the ED on 7/27 for wound and redness underneath right knee. Pt stated this happened when he was painting the house, he got down on his knee and a nail punched in. Xray of knee w/o abnormality. In the ED he was prescribed Bactrim and Keflex and discharged. He presented back to the ED on7/29 for wound check- pt noticed edema and redness has progressed above his knee and down to his ankle. Lactic acid normal, blood culture collected x 1 NGTD, wound culture with moderate WBC's, no organisms seen. Pt started on IV Abx and admitted for further management of care. Wound care following. PVL RLE negative. MRI right knee soft tissue puncture wound to anterior pretibial soft tissues at level of tibial tuberosity w/ adjacent findings of cellulitis and ill-defined subcutaneous phlegmon w/in prepatellar soft tissue. Pt spiked temp of 102.4 on 7/30- UA negative, repeat blood cultures x 2 pending and CXR negative. Ortho consulted. Pt medically clear to proceed with incision and drainage with Dr. Christiano Ambrosio today 7/31/2018. Assessment/Plan:     Patient Active Problem List   Diagnosis Code    Cellulitis of right leg L03.115    Infected wound T14. 8XXA, L08.9    Tobacco abuse Z72.0       A/P:  Cellulitis right leg  - seen in ED on 7/27- xray normal, d/c with Keflex and Bactrim  - presented back to ED on 7/29 for wound check, now with edema and progression of erythema  - blood culture collected 7/29 x 1 NGTD/  - wound culture with moderate WBC's, no organisms seen  - PVL RLE negative   - MRI right knee soft tissue puncture wound to anterior pretibial soft tissues at level of tibial tuberosity w/ adjacent findings of cellulitis and ill-defined subcutaneous phlegmon w/in prepatellar soft tissue  - ortho and ID following, appreciate input   - continue IV Vancomycin and Merrem  - s/p I&D RLE for pre-patellar bursa 7/31 with wound vac placed, culture with many gram positive cocci in pairs and in groups, anaerobic culture in progress  - per ortho- have pt lay prone to facilitate output  - CRP 12, ESR 96  - PT eval pending    Infected Wound  - received Tetanus shot on 7/27  - wound culture with moderate WBC's, no organisms seen  - wound care consult, appreciate input  - continue IV abx per ID    Fever  - temp of 102.4 on 7/30  - CXR negative  - WBC normal  - UA negative, urine culture negative  - repeat blood cultures 7/30 NGTD x 2  - repeat wound culture 7/31 with few gram positive cocci in pairs  - ID consulted- appreciate input  - s/p I&D w/ wound vac placed on 7/31: culture with many gram positive cocci in pairs and in groups, anaerobic culture in progress    Tobacco abuse  - declined nicoderm patch  - educated on cessation    Hx of Basal Cell Skin CA  - f/u outpatient    DVT Prophylaxis  - Lovenox daily      Achilles Bridegroom, JADEN Hayes   Pager:  [phone]  Office:  [phone]        Subjective:     Didn't sleep well last night. Has right knee pain, posterior knee pain as well. Wound vac in place. Ortho recommended laying prone to facilitate wound vac drainage, pt stated he didn't think he would be able to do that, but if absolutely necessary he would try. Willing to try sitting in chair. PT consulted, eval pending.     Objective:      Visit Vitals    /78 (BP 1 Location: Left arm, BP Patient Position: At rest)    Pulse 70    Temp 98.3 °F (36.8 °C)    Resp 16    Ht 5' 9\" (1.753 m)    Wt 81.2 kg (179 lb)    SpO2 97%    BMI 26.43 kg/m2           Physical Exam:  General appearance: alert, cooperative, no distress, appears stated age  Head: Normocephalic, without obvious abnormality, atraumatic  Lungs: clear to auscultation bilaterally  Heart: regular rate and rhythm, S1, S2 normal, no murmur, click, rub or gallop  Abdomen: soft, non tender, non distended. Normoactive bowel sounds. Extremities:wound vac to anterior right knee itntact with minimal sanguineous drainage, erythema noted to RLE, 1+ edema, DP palpable bilaterally  Skin: see above  Neurologic: Grossly normal  PSY: Mood and affect normal, appropriately behaved        Intake and Output:  Current Shift:     Last three shifts:  07/30 1901 - 08/01 0700  In: 3623.8 [P.O.:1200;  I.V.:2423.8]  Out: 2650 [Urine:2650]    Recent Results (from the past 24 hour(s))   CULTURE, WOUND W GRAM STAIN    Collection Time: 07/31/18  8:30 AM   Result Value Ref Range    Special Requests: NO SPECIAL REQUESTS      GRAM STAIN MANY WBC'S      GRAM STAIN FEW GRAM POSITIVE COCCI IN PAIRS      Culture result: PENDING    CULTURE, ANAEROBIC    Collection Time: 07/31/18 12:30 PM   Result Value Ref Range    Special Requests: NO SPECIAL REQUESTS      Culture result: CULTURE IN PROGRESS,FURTHER UPDATES TO FOLLOW     CULTURE, TISSUE W GRAM STAIN    Collection Time: 07/31/18  5:09 PM   Result Value Ref Range    Special Requests: NO SPECIAL REQUESTS      GRAM STAIN MANY WBC'S      GRAM STAIN MANY GRAM POSITIVE COCCI IN PAIRS      GRAM STAIN MANY GRAM POSITIVE COCCI IN GROUPS      Culture result: PENDING    CULTURE, ANAEROBIC    Collection Time: 07/31/18  5:09 PM   Result Value Ref Range    Special Requests: NO SPECIAL REQUESTS      Culture result: CULTURE IN PROGRESS,FURTHER UPDATES TO FOLLOW     VANCOMYCIN, TROUGH    Collection Time: 07/31/18  8:40 PM   Result Value Ref Range    Vancomycin,trough 6.5 (L) 10.0 - 20.0 ug/mL   SED RATE (ESR)    Collection Time: 08/01/18  5:05 AM   Result Value Ref Range    Sed rate, automated 96 (H) 0 - 20 mm/hr C REACTIVE PROTEIN, QT    Collection Time: 08/01/18  5:05 AM   Result Value Ref Range    C-Reactive protein 12.0 (H) 0 - 0.3 mg/dL           Lab Results   Component Value Date/Time    Glucose 118 (H) 07/31/2018 05:42 AM    Glucose 112 (H) 07/30/2018 05:05 AM    Glucose 114 (H) 07/29/2018 08:02 PM    Glucose 97 07/29/2018 01:50 PM    Glucose 109 (H) 07/27/2018 07:10 PM        Imaging:  No results found.     Medication List Reviewed:  Current Facility-Administered Medications   Medication Dose Route Frequency    meropenem (MERREM) 500 mg in 0.9% sodium chloride (MBP/ADV) 50 mL MBP  500 mg IntraVENous Q8H    0.9% sodium chloride infusion  125 mL/hr IntraVENous CONTINUOUS    morphine injection 2 mg  2 mg IntraVENous Q3H PRN    oxyCODONE-acetaminophen (PERCOCET 10)  mg per tablet 2 Tab  2 Tab Oral Q6H PRN    VANCOMYCIN INFORMATION NOTE   Other ONCE    VANCOMYCIN INFORMATION NOTE 1 Each  1 Each Other Rx Dosing/Monitoring    vancomycin (VANCOCIN) 1,000 mg in 0.9% sodium chloride (MBP/ADV) 250 mL adv  1,000 mg IntraVENous Q12H    sodium chloride (NS) flush 5-10 mL  5-10 mL IntraVENous Q8H    sodium chloride (NS) flush 5-10 mL  5-10 mL IntraVENous PRN    enoxaparin (LOVENOX) injection 40 mg  40 mg SubCUTAneous Q24H

## 2018-08-01 NOTE — ROUTINE PROCESS
Assumed care of patient at [facility] report received from Richard Kwon RN. Received pt ambulates to BR, denies nausea. Right knee dressing intact connected to wound VAC machine. Call bell within reach. All safety precautions in place - safety maintained. 2015 [facility]  [facility] Good appetite. 2113 - Medicated pt for pain. Pain rate of 7/10.  2210 -  Pt verbalized some relief of pain. 0010 -  Ice cream given per pt patient requests. 8140  -  Medicated pt for pain. 0150 - Pt verbalized some relief of pain. 0300 -  Pt asleep. 4059 -  Medicated pt for pain. 0720 -  Bedside and Verbal shift change report given to Richard Kwon RN (oncoming nurse) by Lucrecia Ferreira RN BSN (offgoing nurse). Report given with SBAR, Kardex, Intake/Output, MAR and Recent Results.

## 2018-08-01 NOTE — PROGRESS NOTES
0740 Received pt resting on bed. Denies pain at this time, Morphine was just given. Wound vac draining minimal amt of serosanguinous output, dressing intact, placed yesterday per report. Pt has not been out of bed yet. Voiding with urinal within reach. 1100 PT consult placed, pt refused saying he has not slept since yesterday, will do PT this PM per pt.    1130 Informed pt of plan to wean him from Morphine by giving Percocet in between instead of just getting Morphine. 1300 Pt ambulated in the room with PT, tolerated well. Not using walker. [address] wound care re wound care consult, wound care nurse checked wound vac, working good, due for change tomorrow. Wound care Rn will do dressing change tomorrow. 1830 Pt ambulating to bathroom by himself, gait is steady, tolerated very well. Pain under control with Percocet. Still with wound vac on, periwound red and edematous, no pain or swelling noted behind the knee. Pt voiding to bathroom. VS within normal range. 1920 Bedside and Verbal shift change report given to [address] (oncoming nurse) by Haider Machado   (offgoing nurse). Report included the following information SBAR, Kardex, Intake/Output and MAR.

## 2018-08-01 NOTE — PROGRESS NOTES
Problem: Falls - Risk of  Goal: *Absence of Falls  Document Yoko Fall Risk and appropriate interventions in the flowsheet.    Outcome: Progressing Towards Goal  Fall Risk Interventions:  Mobility Interventions: Patient to call before getting OOB         Medication Interventions: Evaluate medications/consider consulting pharmacy    Elimination Interventions: Patient to call for help with toileting needs    History of Falls Interventions: Door open when patient unattended

## 2018-08-01 NOTE — CDMP QUERY
Please clarify for coding purposes, if this Debridement was  
 
=>Excisional Debridement  (definite cutting away of tissue)  See Explanation below) Or   
=>Non-Excisional debridement     
 
=>Unable to Determine (no explanation of clinical findings) Excisonal Debridement Vs Non-excisional  
Excisional debridement of the skin or subcutaneous tissue is the surgical removal or cutting away of such tissue, necrosis, or slough and is classified to the root operation \"Excision. \"  
 
Nonexcisional debridement of the skin is the nonoperative brushing, irrigating, scrubbing, or washing of devitalized tissue, necrosis, slough, or foreign material. Most nonexcisional debridement procedures are classified to the root operation \"Extraction Thank you, 
Leobardo Jackson RN/CCDS 
[phone]

## 2018-08-02 LAB
ANION GAP SERPL CALC-SCNC: 7 MMOL/L (ref 3–18)
BACTERIA SPEC CULT: ABNORMAL
BACTERIA SPEC CULT: ABNORMAL
BASOPHILS # BLD: 0 K/UL (ref 0–0.1)
BASOPHILS NFR BLD: 0 % (ref 0–2)
BUN SERPL-MCNC: 16 MG/DL (ref 7–18)
BUN/CREAT SERPL: 23 (ref 12–20)
CALCIUM SERPL-MCNC: 8.3 MG/DL (ref 8.5–10.1)
CHLORIDE SERPL-SCNC: 105 MMOL/L (ref 100–108)
CO2 SERPL-SCNC: 28 MMOL/L (ref 21–32)
CREAT SERPL-MCNC: 0.7 MG/DL (ref 0.6–1.3)
DIFFERENTIAL METHOD BLD: ABNORMAL
EOSINOPHIL # BLD: 0.3 K/UL (ref 0–0.4)
EOSINOPHIL NFR BLD: 5 % (ref 0–5)
ERYTHROCYTE [DISTWIDTH] IN BLOOD BY AUTOMATED COUNT: 13.7 % (ref 11.6–14.5)
GLUCOSE SERPL-MCNC: 95 MG/DL (ref 74–99)
GRAM STN SPEC: ABNORMAL
HCT VFR BLD AUTO: 33.6 % (ref 36–48)
HGB BLD-MCNC: 10.9 G/DL (ref 13–16)
LYMPHOCYTES # BLD: 2.2 K/UL (ref 0.9–3.6)
LYMPHOCYTES NFR BLD: 30 % (ref 21–52)
MCH RBC QN AUTO: 30.1 PG (ref 24–34)
MCHC RBC AUTO-ENTMCNC: 32.4 G/DL (ref 31–37)
MCV RBC AUTO: 92.8 FL (ref 74–97)
MONOCYTES # BLD: 0.6 K/UL (ref 0.05–1.2)
MONOCYTES NFR BLD: 8 % (ref 3–10)
NEUTS SEG # BLD: 4.1 K/UL (ref 1.8–8)
NEUTS SEG NFR BLD: 57 % (ref 40–73)
PLATELET # BLD AUTO: 276 K/UL (ref 135–420)
PMV BLD AUTO: 8.9 FL (ref 9.2–11.8)
POTASSIUM SERPL-SCNC: 5 MMOL/L (ref 3.5–5.5)
RBC # BLD AUTO: 3.62 M/UL (ref 4.7–5.5)
SERVICE CMNT-IMP: ABNORMAL
SERVICE CMNT-IMP: ABNORMAL
SODIUM SERPL-SCNC: 140 MMOL/L (ref 136–145)
WBC # BLD AUTO: 7.1 K/UL (ref 4.6–13.2)

## 2018-08-02 PROCEDURE — 74011250636 HC RX REV CODE- 250/636: Performed by: NURSE PRACTITIONER

## 2018-08-02 PROCEDURE — 74011000258 HC RX REV CODE- 258: Performed by: INTERNAL MEDICINE

## 2018-08-02 PROCEDURE — 74011250637 HC RX REV CODE- 250/637: Performed by: ORTHOPAEDIC SURGERY

## 2018-08-02 PROCEDURE — 65270000029 HC RM PRIVATE

## 2018-08-02 PROCEDURE — 97605 NEG PRS WND THER DME<=50SQCM: CPT

## 2018-08-02 PROCEDURE — 74011250636 HC RX REV CODE- 250/636: Performed by: INTERNAL MEDICINE

## 2018-08-02 PROCEDURE — 36415 COLL VENOUS BLD VENIPUNCTURE: CPT | Performed by: NURSE PRACTITIONER

## 2018-08-02 PROCEDURE — 80048 BASIC METABOLIC PNL TOTAL CA: CPT | Performed by: NURSE PRACTITIONER

## 2018-08-02 PROCEDURE — 85025 COMPLETE CBC W/AUTO DIFF WBC: CPT | Performed by: NURSE PRACTITIONER

## 2018-08-02 PROCEDURE — 77030019934 HC DRSG VAC ASST KCON -B

## 2018-08-02 PROCEDURE — 77030019952 HC CANSTR VAC ASST KCON -B

## 2018-08-02 PROCEDURE — 74011250636 HC RX REV CODE- 250/636: Performed by: ORTHOPAEDIC SURGERY

## 2018-08-02 RX ORDER — MORPHINE SULFATE 2 MG/ML
1 INJECTION, SOLUTION INTRAMUSCULAR; INTRAVENOUS ONCE
Status: COMPLETED | OUTPATIENT
Start: 2018-08-02 | End: 2018-08-02

## 2018-08-02 RX ORDER — LIDOCAINE HYDROCHLORIDE 40 MG/ML
SOLUTION TOPICAL
Status: DISCONTINUED | OUTPATIENT
Start: 2018-08-02 | End: 2018-08-06 | Stop reason: HOSPADM

## 2018-08-02 RX ORDER — CEFAZOLIN SODIUM 2 G/50ML
2 SOLUTION INTRAVENOUS EVERY 8 HOURS
Status: DISCONTINUED | OUTPATIENT
Start: 2018-08-02 | End: 2018-08-06 | Stop reason: HOSPADM

## 2018-08-02 RX ADMIN — MORPHINE SULFATE 2 MG: 4 INJECTION INTRAVENOUS at 22:55

## 2018-08-02 RX ADMIN — OXYCODONE HYDROCHLORIDE AND ACETAMINOPHEN 2 TABLET: 10; 325 TABLET ORAL at 08:21

## 2018-08-02 RX ADMIN — Medication 10 ML: at 22:56

## 2018-08-02 RX ADMIN — OXYCODONE HYDROCHLORIDE AND ACETAMINOPHEN 2 TABLET: 10; 325 TABLET ORAL at 00:55

## 2018-08-02 RX ADMIN — MORPHINE SULFATE 2 MG: 4 INJECTION INTRAVENOUS at 05:50

## 2018-08-02 RX ADMIN — Medication 10 ML: at 05:59

## 2018-08-02 RX ADMIN — OXYCODONE HYDROCHLORIDE AND ACETAMINOPHEN 2 TABLET: 10; 325 TABLET ORAL at 20:12

## 2018-08-02 RX ADMIN — Medication 10 ML: at 14:00

## 2018-08-02 RX ADMIN — CEFAZOLIN SODIUM 2 G: 2 SOLUTION INTRAVENOUS at 20:12

## 2018-08-02 RX ADMIN — OXYCODONE HYDROCHLORIDE AND ACETAMINOPHEN 2 TABLET: 10; 325 TABLET ORAL at 14:35

## 2018-08-02 RX ADMIN — MORPHINE SULFATE 2 MG: 4 INJECTION INTRAVENOUS at 19:17

## 2018-08-02 RX ADMIN — ENOXAPARIN SODIUM 40 MG: 100 INJECTION SUBCUTANEOUS at 20:12

## 2018-08-02 RX ADMIN — MORPHINE SULFATE 2 MG: 4 INJECTION INTRAVENOUS at 16:12

## 2018-08-02 RX ADMIN — CEFAZOLIN SODIUM 2 G: 2 SOLUTION INTRAVENOUS at 12:16

## 2018-08-02 RX ADMIN — MEROPENEM 500 MG: 500 INJECTION, POWDER, FOR SOLUTION INTRAVENOUS at 05:40

## 2018-08-02 RX ADMIN — MORPHINE SULFATE 1 MG: 2 INJECTION, SOLUTION INTRAMUSCULAR; INTRAVENOUS at 10:44

## 2018-08-02 NOTE — PROGRESS NOTES
Attempted PT X3 today; first attempt pt in pain, second attempt pt getting wound care, third attempt pt in bathroom. Will continue to follow.   Doug Haney, PTA

## 2018-08-02 NOTE — PROGRESS NOTES
0715 Received pt resting on bed, pain under control. Wound vac dressing c/d/i, draining to serosanguinous output. Periwound edematous up to thigh, red below the knee, pillow under. IV site c/d/i. Pt ambulating. Due for dressing change today c/o wound care RN.    7565 Spoke with wound care RN, wound vac dressing change ongoing, pt in a lot of pain but refused to have Morphine. Percocet 2 tabs given over an hour ago. NP ordered lidocaine topical, wound care RN will apply it and will resume dressing change in 15 mins. 1230 Dressing changed, c/d/i. Canister replaced also. 1500 Pt upset at this time. Per pt MD in the room and told him he's supposed to be on prone position to enhance draining through wound vac. Pt tried but he ended up having so much pain in his neck, back and knees. 1800 Pt still getting Morphine and Percocet for pain. Wound vac dressing c/d/i, periwound still with redness and swelling. Palpable pulse, can wiggle toes, with sensation. IV site no sign of infiltration. Pt ambulating in the room, no assistance needed. VS within normal range. 1915 Bedside and Verbal shift change report given to [address] (oncoming nurse) by Cesar Ho (offgoing nurse). Report included the following information SBAR, Kardex, Intake/Output and MAR.

## 2018-08-02 NOTE — PROGRESS NOTES
Wound Vac paper work faxed to [employer] approval form given to patient to fill out and return to me signed so that I can fax form back to [facility] for wound Vac approval.    Form has been faxed back to [facility]

## 2018-08-02 NOTE — PROGRESS NOTES
Problem: Falls - Risk of  Goal: *Absence of Falls  Document Yoko Fall Risk and appropriate interventions in the flowsheet. Outcome: Progressing Towards Goal  Fall Risk Interventions:  Mobility Interventions: Communicate number of staff needed for ambulation/transfer, Patient to call before getting OOB, PT Consult for mobility concerns, Strengthening exercises (ROM-active/passive)         Medication Interventions: Assess postural VS orthostatic hypotension, Patient to call before getting OOB, Teach patient to arise slowly    Elimination Interventions: Call light in reach, Patient to call for help with toileting needs, Toileting schedule/hourly rounds, Urinal in reach    History of Falls Interventions: Consult care management for discharge planning, Door open when patient unattended, Evaluate medications/consider consulting pharmacy, Room close to nurse's station        Problem: Pressure Injury - Risk of  Goal: *Prevention of pressure injury  Document Erick Scale and appropriate interventions in the flowsheet. Outcome: Progressing Towards Goal  Pressure Injury Interventions: Activity Interventions: Increase time out of bed, Pressure redistribution bed/mattress(bed type), PT/OT evaluation    Mobility Interventions: HOB 30 degrees or less, Pressure redistribution bed/mattress (bed type), PT/OT evaluation, Turn and reposition approx.  every two hours(pillow and wedges)    Nutrition Interventions: Document food/fluid/supplement intake    Friction and Shear Interventions: Apply protective barrier, creams and emollients, HOB 30 degrees or less, Lift team/patient mobility team, Transfer aides:transfer board/Carri lift/ceiling lift, Transferring/repositioning devices

## 2018-08-02 NOTE — PROGRESS NOTES
Met with the pt to discuss home care services and offer FOC. Pt chose [facility] and verified the contact information on the face sheet is correct. DC pending. Home care referral sent to [facility] via [address] and called to intake rep, Barb Duron, as a w/e pending. DC. .  Care Management Interventions  PCP Verified by CM: Yes  Palliative Care Criteria Met (RRAT>21 & CHF Dx)?: No  Transition of Care Consult (CM Consult): [address]: Yes  Occupational Therapy Consult:  (Skilled Nurse. ( Wd Vac))  Current Support Network: Lives Alone  Confirm Follow Up Transport: Family  Plan discussed with Pt/Family/Caregiver: Yes  Freedom of Choice Offered: Yes  Discharge Location  Discharge Placement: Home with home health

## 2018-08-02 NOTE — PROGRESS NOTES
1432 - entered chart to review due time of next pain medication, primary nurse unavailable at this time. Advised primary nurse Jessica Rodríguez RN.

## 2018-08-02 NOTE — PROGRESS NOTES
[facility]  Hospitalist Division        Inpatient Daily Progress Note    Daily progress Note    Patient: Papito Shah MRN: [id number]  CSN: [id number]    YOB: 1955  Age: 65 y.o. Sex: male    DOA: 7/29/2018 LOS:  LOS: 4 days                    Chief Complaint:  Cellulitis right leg    Interval History: 66 y/o male with hx of Basal Cell Skin CA presented to the ED on 7/27 for wound and redness underneath right knee. Pt stated this happened when he was painting the house, he got down on his knee and a nail punched in. Xray of knee w/o abnormality. In the ED he was prescribed Bactrim and Keflex and discharged. He presented back to the ED on7/29 for wound check- pt noticed edema and redness has progressed above his knee and down to his ankle. Lactic acid normal, blood culture collected x 1 NGTD, wound culture with moderate WBC's, no organisms seen. Pt started on IV Abx and admitted for further management of care. Wound care following. PVL RLE negative. MRI right knee soft tissue puncture wound to anterior pretibial soft tissues at level of tibial tuberosity w/ adjacent findings of cellulitis and ill-defined subcutaneous phlegmon w/in prepatellar soft tissue. Pt spiked temp of 102.4 on 7/30- UA negative, repeat blood cultures x 2 pending and CXR negative. Ortho consulted. S/p  I&D RLE for pre-patellar bursa 7/31 with wound vac placed. Culture with many staph aureus, anaerobic culture negative. Assessment/Plan:     Patient Active Problem List   Diagnosis Code    Cellulitis of right leg L03.115    Infected wound T14. 8XXA, L08.9    Tobacco abuse Z72.0       A/P:  Cellulitis right leg  - seen in ED on 7/27- xray normal, d/c with Keflex and Bactrim  - presented back to ED on 7/29 for wound check, now with edema and progression of erythema  - blood culture collected 7/29 x 1 NGTD/  - wound culture with moderate WBC's, no organisms seen  - PVL RLE negative - MRI right knee soft tissue puncture wound to anterior pretibial soft tissues at level of tibial tuberosity w/ adjacent findings of cellulitis and ill-defined subcutaneous phlegmon w/in prepatellar soft tissue  - ortho and ID following, appreciate input   - s/p I&D RLE for pre-patellar bursa 7/31 with wound vac placed, culture with many staph aureus, anaerobic culture negative  - per ID, Vanc not in therapeutic range so switching to IV Ancef - will need to stay on IV abx an additional 2-3 days  - per ortho- have pt lay prone to facilitate output  - CRP 12, ESR 96  - PT following    Infected Wound  - received Tetanus shot on 7/27  - wound culture with moderate WBC's, no organisms seen  - wound care consult, appreciate input  - continue IV abx per ID    Fever  - temp of 102.4 on 7/30  - CXR negative  - WBC normal  - UA negative, urine culture negative  - repeat blood cultures 7/30 NGTD x 2  - repeat wound culture 7/31 with staph aurues  - ID consulted- appreciate input  - s/p I&D w/ wound vac placed on 7/31: culture with many staph aureus, anaerobic culture negative  - resolved    Tobacco abuse  - declined nicoderm patch  - educated on cessation    Hx of Basal Cell Skin CA  - f/u outpatient    DVT Prophylaxis  - Lovenox daily      JADEN Curry [address]  Pager:  [phone]  Office:  [phone]        Subjective:     Pain at wound/surgical site, controlled with meds. Wound care following, Lidocaine patch requested and ordered. Still has erythema and edema RLE, discussed with ID, abx changed as Vanc trough not in range.     Objective:      Visit Vitals    /79 (BP 1 Location: Left arm, BP Patient Position: At rest)    Pulse 65    Temp 97.7 °F (36.5 °C)    Resp 18    Ht 5' 9\" (1.753 m)    Wt 81.2 kg (179 lb)    SpO2 96%    BMI 26.43 kg/m2           Physical Exam:  General appearance: alert, cooperative, no distress, appears stated age  Head: Normocephalic, without obvious abnormality, atraumatic  Lungs: clear to auscultation bilaterally  Heart: regular rate and rhythm, S1, S2 normal, no murmur, click, rub or gallop  Abdomen: soft, non tender, non distended. Normoactive bowel sounds. Extremities:wound vac to anterior right knee itntact with minimal sanguineous drainage, erythema noted to RLE, 1+ edema, DP palpable bilaterally  Skin: see above  Neurologic: Grossly normal  PSY: Mood and affect normal, appropriately behaved        Intake and Output:  Current Shift:     Last three shifts:  07/31 1901 - 08/02 0700  In: 4916.3 [P.O.:1560; I.V.:3356.3]  Out: 2270 [Urine:2250; Drains:20]    Recent Results (from the past 24 hour(s))   VANCOMYCIN, TROUGH    Collection Time: 08/01/18 10:26 AM   Result Value Ref Range    Vancomycin,trough 8.0 (L) 10.0 - 20.0 ug/mL    Reported dose date: [id number]      Reported dose time: 1700     CBC WITH AUTOMATED DIFF    Collection Time: 08/02/18  4:00 AM   Result Value Ref Range    WBC 7.1 4.6 - 13.2 K/uL    RBC 3.62 (L) 4.70 - 5.50 M/uL    HGB 10.9 (L) 13.0 - 16.0 g/dL    HCT 33.6 (L) 36.0 - 48.0 %    MCV 92.8 74.0 - 97.0 FL    MCH 30.1 24.0 - 34.0 PG    MCHC 32.4 31.0 - 37.0 g/dL    RDW 13.7 11.6 - 14.5 %    PLATELET 781 310 - 460 K/uL    MPV 8.9 (L) 9.2 - 11.8 FL    NEUTROPHILS 57 40 - 73 %    LYMPHOCYTES 30 21 - 52 %    MONOCYTES 8 3 - 10 %    EOSINOPHILS 5 0 - 5 %    BASOPHILS 0 0 - 2 %    ABS. NEUTROPHILS 4.1 1.8 - 8.0 K/UL    ABS. LYMPHOCYTES 2.2 0.9 - 3.6 K/UL    ABS. MONOCYTES 0.6 0.05 - 1.2 K/UL    ABS. EOSINOPHILS 0.3 0.0 - 0.4 K/UL    ABS.  BASOPHILS 0.0 0.0 - 0.1 K/UL    DF AUTOMATED     METABOLIC PANEL, BASIC    Collection Time: 08/02/18  4:00 AM   Result Value Ref Range    Sodium 140 136 - 145 mmol/L    Potassium 5.0 3.5 - 5.5 mmol/L    Chloride 105 100 - 108 mmol/L    CO2 28 21 - 32 mmol/L    Anion gap 7 3.0 - 18 mmol/L    Glucose 95 74 - 99 mg/dL    BUN 16 7.0 - 18 MG/DL    Creatinine 0.70 0.6 - 1.3 MG/DL BUN/Creatinine ratio 23 (H) 12 - 20      GFR est AA >60 >60 ml/min/1.73m2    GFR est non-AA >60 >60 ml/min/1.73m2    Calcium 8.3 (L) 8.5 - 10.1 MG/DL           Lab Results   Component Value Date/Time    Glucose 95 08/02/2018 04:00 AM    Glucose 118 (H) 07/31/2018 05:42 AM    Glucose 112 (H) 07/30/2018 05:05 AM    Glucose 114 (H) 07/29/2018 08:02 PM    Glucose 97 07/29/2018 01:50 PM        Imaging:  No results found.     Medication List Reviewed:  Current Facility-Administered Medications   Medication Dose Route Frequency    vancomycin (VANCOCIN) 1,000 mg in 0.9% sodium chloride (MBP/ADV) 250 mL adv  1,000 mg IntraVENous Q12H    [START ON 8/3/2018] VANCOMYCIN INFORMATION NOTE   Other ONCE    meropenem (MERREM) 500 mg in 0.9% sodium chloride (MBP/ADV) 50 mL MBP  500 mg IntraVENous Q8H    morphine injection 2 mg  2 mg IntraVENous Q3H PRN    oxyCODONE-acetaminophen (PERCOCET 10)  mg per tablet 2 Tab  2 Tab Oral Q6H PRN    VANCOMYCIN INFORMATION NOTE 1 Each  1 Each Other Rx Dosing/Monitoring    sodium chloride (NS) flush 5-10 mL  5-10 mL IntraVENous Q8H    sodium chloride (NS) flush 5-10 mL  5-10 mL IntraVENous PRN    enoxaparin (LOVENOX) injection 40 mg  40 mg SubCUTAneous Q24H

## 2018-08-02 NOTE — WOUND CARE
Wound/Ostomy Nurse Progress Note          Patient: Lakeisha Pitt                                                                                      KIK:[id number]    MRN: [id number]              Situation: Wound care consult for NPWT dressing change. Background: Patient admitted to [facility] for right knee infection from a nail puncture wound. His is nowS/P right knee debridement on 7/31/18 by Dr. Belia Arnett. He currently has a wound vac in place. Assessment: The patient was sitting up in bed, awake and alert, when the wound care team arrived for his dressing change. He states he was given Dilaudid PO within the last hour, but his knee still hurts to touch. He states his pain is 9/10 when his dressing change was attempted. Lidocaine 4% topical solution was ordered by Leticia Gonzales NP. The lidocaine was then instilled into the black foam, and also poured onto gauze and placed on his knee for 15 minutes. The patient was still in distress and felt he could not continue as his knee hurt too much. He called his nurse, Hien Cabello, for pain medication. Patient was given extra pain meds, and the dressing change proceeded. The black foam was gently removed from the knee wound. The wound was irrigated with NS. The wound measures 3.5 x 2 x 1.6 cm, with undermining from 7 o'clock to 11 o'clock measuring 3.4 cm at 8 o'clock. The base is granulating nicely, and there are no tunnels or sinus tracts. The wound dressing was re-applied with white foam placed in the undermining and black foam on top. The vac was set at 125 mmHg. Recommendation: Continue to monitor vac and canister for excessive drainage or change in color and consistency. Next dressing change due on 8/4/18.

## 2018-08-02 NOTE — PROGRESS NOTES
Orthopaedics    Patient without new complaints status post INCISION AND DRAINAGE LOWER EXTREMITY for Cellulitis of right leg  pre patellar bursa 7/29/2018. Pain about the same. Voiding and BM. Visit Vitals    /81 (BP 1 Location: Left arm, BP Patient Position: At rest)    Pulse (!) 57    Temp 97.8 °F (36.6 °C)    Resp 18    Ht 5' 9\" (1.753 m)    Wt 81.2 kg (179 lb)    SpO2 96%    BMI 26.43 kg/m2       CBC w/Diff    Lab Results   Component Value Date/Time    WBC 7.1 08/02/2018 04:00 AM    RBC 3.62 (L) 08/02/2018 04:00 AM    HCT 33.6 (L) 08/02/2018 04:00 AM    MCV 92.8 08/02/2018 04:00 AM    MCH 30.1 08/02/2018 04:00 AM    MCHC 32.4 08/02/2018 04:00 AM    RDW 13.7 08/02/2018 04:00 AM    Lab Results   Component Value Date/Time    MONOS 8 08/02/2018 04:00 AM    EOS 5 08/02/2018 04:00 AM    BASOS 0 08/02/2018 04:00 AM    RDW 13.7 08/02/2018 04:00 AM          Physical exam:  Wound about the same. AT, GS intact  Bilateral Lower Extremities. Calves soft and nontender. Culture    Many staph aureus. No strep       Assessment:  Status post INCISION AND DRAINAGE LOWER EXTREMITY for Cellulitis of right leg  pre patellar bursa 7/29/2018,  No significant progress yet      PLAN: I asked Dr. Puma Tian to eval the pt. He rec cont with present tx plan  Have stressed to the pt the importance of laying prone to facilitate drainage  Cont antibiotics per ID  May be oob to bathroom.   Juan Manuel Rubin MD  August 2, 2018

## 2018-08-03 LAB
DATE LAST DOSE: ABNORMAL
REPORTED DOSE,DOSE: ABNORMAL UNITS
REPORTED DOSE/TIME,TMG: 2300
VANCOMYCIN TROUGH SERPL-MCNC: 3.3 UG/ML (ref 10–20)

## 2018-08-03 PROCEDURE — 80202 ASSAY OF VANCOMYCIN: CPT | Performed by: HOSPITALIST

## 2018-08-03 PROCEDURE — 36415 COLL VENOUS BLD VENIPUNCTURE: CPT | Performed by: HOSPITALIST

## 2018-08-03 PROCEDURE — 65270000029 HC RM PRIVATE

## 2018-08-03 PROCEDURE — 97116 GAIT TRAINING THERAPY: CPT

## 2018-08-03 PROCEDURE — 74011250636 HC RX REV CODE- 250/636: Performed by: INTERNAL MEDICINE

## 2018-08-03 PROCEDURE — 74011250637 HC RX REV CODE- 250/637: Performed by: ORTHOPAEDIC SURGERY

## 2018-08-03 PROCEDURE — 74011250636 HC RX REV CODE- 250/636: Performed by: ORTHOPAEDIC SURGERY

## 2018-08-03 RX ADMIN — OXYCODONE HYDROCHLORIDE AND ACETAMINOPHEN 2 TABLET: 10; 325 TABLET ORAL at 02:06

## 2018-08-03 RX ADMIN — OXYCODONE HYDROCHLORIDE AND ACETAMINOPHEN 2 TABLET: 10; 325 TABLET ORAL at 08:22

## 2018-08-03 RX ADMIN — CEFAZOLIN SODIUM 2 G: 2 SOLUTION INTRAVENOUS at 04:45

## 2018-08-03 RX ADMIN — Medication 10 ML: at 06:00

## 2018-08-03 RX ADMIN — MORPHINE SULFATE 2 MG: 4 INJECTION INTRAVENOUS at 04:45

## 2018-08-03 RX ADMIN — Medication 10 ML: at 17:26

## 2018-08-03 RX ADMIN — CEFAZOLIN SODIUM 2 G: 2 SOLUTION INTRAVENOUS at 12:08

## 2018-08-03 RX ADMIN — CEFAZOLIN SODIUM 2 G: 2 SOLUTION INTRAVENOUS at 20:06

## 2018-08-03 RX ADMIN — Medication 10 ML: at 22:43

## 2018-08-03 RX ADMIN — OXYCODONE HYDROCHLORIDE AND ACETAMINOPHEN 2 TABLET: 10; 325 TABLET ORAL at 14:22

## 2018-08-03 RX ADMIN — MORPHINE SULFATE 2 MG: 4 INJECTION INTRAVENOUS at 17:26

## 2018-08-03 RX ADMIN — MORPHINE SULFATE 2 MG: 4 INJECTION INTRAVENOUS at 10:22

## 2018-08-03 RX ADMIN — OXYCODONE HYDROCHLORIDE AND ACETAMINOPHEN 2 TABLET: 10; 325 TABLET ORAL at 20:42

## 2018-08-03 RX ADMIN — MORPHINE SULFATE 2 MG: 4 INJECTION INTRAVENOUS at 22:43

## 2018-08-03 RX ADMIN — ENOXAPARIN SODIUM 40 MG: 100 INJECTION SUBCUTANEOUS at 20:42

## 2018-08-03 NOTE — PROGRESS NOTES
Orthopaedics    Patient without new complaints status post INCISION AND DRAINAGE LOWER EXTREMITY for Cellulitis of right leg  pre patellar bursa 7/29/2018. Pt feels better today. Visit Vitals    /73 (BP 1 Location: Right arm, BP Patient Position: At rest)    Pulse 66    Temp 97.5 °F (36.4 °C)    Resp 18    Ht 5' 9\" (1.753 m)    Wt 81.2 kg (179 lb)    SpO2 96%    BMI 26.43 kg/m2       CBC w/Diff    Lab Results   Component Value Date/Time    WBC 7.1 08/02/2018 04:00 AM    RBC 3.62 (L) 08/02/2018 04:00 AM    HCT 33.6 (L) 08/02/2018 04:00 AM    MCV 92.8 08/02/2018 04:00 AM    MCH 30.1 08/02/2018 04:00 AM    MCHC 32.4 08/02/2018 04:00 AM    RDW 13.7 08/02/2018 04:00 AM    Lab Results   Component Value Date/Time    MONOS 8 08/02/2018 04:00 AM    EOS 5 08/02/2018 04:00 AM    BASOS 0 08/02/2018 04:00 AM    RDW 13.7 08/02/2018 04:00 AM          Physical exam:  r leg less red than yesterday/  AT, GS intact  Bilateral Lower Extremities. Calves soft and nontender. Final cultures staph aureus       Assessment:  Status post INCISION AND DRAINAGE LOWER EXTREMITY for Cellulitis of right leg  pre patellar bursa 7/29/2018,  progressing. PLAN:  Mobilize. Cont antibiotics per ID.   Cont prone as much as possible    Wei Sanchez MD  August 3, 2018

## 2018-08-03 NOTE — ROUTINE PROCESS
..Bedside shift change report given to Vandana Guzman (oncoming nurse) by Cy Can (offgoing nurse). Report included the following information SBAR, Kardex and MAR.

## 2018-08-03 NOTE — ROUTINE PROCESS
[address] August 3, 2018       RE: Joey Antonio      To Whom It May Concern,    This is to certify that Joey Antonio has been hospitalized since 7/29/2018 and will continue to be hospitalized until further notice due to a medical condition that is requiring inpatient treatment. Due to the nature of his condition, it is not safe nor recommended for him to be in a closed group setting, including flights or cruise settings/excursions. Please feel free to contact my office if you have any questions or concerns. Thank you for your assistance in this matter.       Sincerely,    JADEN MartinezKindred Healthcaresint[facility]  Pager:  [phone]  Office:  [phone]

## 2018-08-03 NOTE — ROUTINE PROCESS
Assumed care of patient at [address] [place] [address] report received from  Vikram YEE RN. Alert & oriented, denies nausea. Erythema & swelling RLE. Dressing  Right Knee with wound Vac draining well. Pt has good appetite. All safety precautions in place - safety maintained. 2012  - Percocet 1 tab given for pain. 2255 -  Medicated pt for pain. Pain rate 8/10.    2350 - Pain rate of 7/10. Patient eating. 0120 -  Patient resting quietly. 1293 - complaints of pain. Administered Morphine 2 mg IV prn. Pain rate of 8/10.    0800 - Bedside and Verbal shift change report given to Martha Arndt RN (oncoming nurse) by Charly Sanchez RN BSN (offgoing nurse). Report given with SBAR, Kardex, Intake/Output, MAR and Recent Results.

## 2018-08-03 NOTE — PROGRESS NOTES
INFECTIOUS DISEASE FOLLOW UP NOTE :-   Will follow back on Monday, Dr Oralia Fernandez is on call this weekend, he can be reached at [phone] for any questions or concerns. Admit Date: 7/29/2018    ABX;      Current  Prior    Ancef 8/2 - 1 Levaquin 7.29-2, Vanco 7/29-4, Meropenem 7/31-2     ASSESSMENT: -> RECS     Right leg wound with cellulitis MSSA with prepatellar purulent bursitis- started with puncture nail wound over a week ago  - suspect nail was old and pascual and infected  - pt s/p Tetanus shot in ED 7/27  - wound cx - MSSA, Blcx ntd  - MRI knee neg for septic arthritis, PVLs neg  - s/p I and D of prepatellar bursa and fascia 7/31 by Dr Christiano Ambrosio   - ESR/CRP 96/12  - final OR cx with MSSA   - cont Iv Ancef [ has tolerated keflex as outpt ]  - if cont to improve on ancef then will switch to po keflex on discharge however he may need additional 2-3 days on iv abx. If no improvement despite 2-3 days of abx then will finish total course with iv abx.  - will need minimum 14 days of abx from surgery [ 11 more days ]  - d/w Medicine team   New fever 7/30- suspect from un drained abscess  - blcx neg, leukocytosis resolved  - fever resolved  - fever resolved    H/o basal cell ca     Smoker - Counseled on cessation   Ax- PCN- >30 years ago - tolerated keflex as out pt, tolerated meropenem     MICROBIOLOGY:   7/29               Wd cx - MSSA                         Blcx x1 neg  7/30               Blcx x2 ntd                         Ucx neg  7/31               Wound cx - MSSA                        Anaerobic cx ntd                         Tissue cx - MSSA     LINES AND CATHETERS:   PIV    SUBJECTIVE :     Interval notes reviewed. Pt is feeling slightly better, says that his leg is still swollen but redness over leg slightly better. No fever/chills.  Unable to be in prone position because of back and neck pain, which was recommended by Dr Gage Roman for better drainage. MEDICATIONS :     Current Facility-Administered Medications   Medication Dose Route Frequency    ceFAZolin (ANCEF) 2g IVPB in 50 mL D5W  2 g IntraVENous Q8H    lidocaine (XYLOCAINE) 4 % (40 mg/mL) topical solution   Topical Q ,  & SAT    morphine injection 2 mg  2 mg IntraVENous Q3H PRN    oxyCODONE-acetaminophen (PERCOCET 10)  mg per tablet 2 Tab  2 Tab Oral Q6H PRN    sodium chloride (NS) flush 5-10 mL  5-10 mL IntraVENous Q8H    sodium chloride (NS) flush 5-10 mL  5-10 mL IntraVENous PRN    enoxaparin (LOVENOX) injection 40 mg  40 mg SubCUTAneous Q24H       OBJECTIVE :     Visit Vitals    /86 (BP 1 Location: Right arm, BP Patient Position: At rest)    Pulse 68    Temp 97.6 °F (36.4 °C)    Resp 18    Ht 5' 9\" (1.753 m)    Wt 81.2 kg (179 lb)    SpO2 96%    BMI 26.43 kg/m2       Temp (24hrs), Av.7 °F (36.5 °C), Min:97.6 °F (36.4 °C), Max:97.7 °F (36.5 °C)    General: Well-developed, 65y.o. year-old, male, in no acute distress  HEENT: Normocephalic, anicteric sclerae, Pupils equal, round reactive to light, no oropharyngeal lesions. Neck: Supple, no lymphadenopathy, masses or thyromegaly  Chest: Symmetrical expansion  Lungs: Clear to auscultation bilaterally, no dullness  Heart: Regular rhythm, no murmurs, rubs or gallops, No JVD  Abdomen: Soft, non-tender,non distended, no organomegaly, BS+  Musculoskeletal: Rt knee area with wound vac present, surrounding edema and redness on lateral side , also redness and swelling of leg till mid shin, slightly better than yesterday, redness better on lateral side of thigh. 2+ pitting edema throughout the rt leg. CNS: AAOx3. Cranial nerves II-XII intact.  Grossly normal.No NR    Labs: Results:   Chemistry Recent Labs      18   0400   GLU  95   NA  140   K  5.0   CL  105   CO2  28   BUN  16   CREA  0.70   CA  8.3*   AGAP  7   BUCR  23*      CBC w/Diff Recent Labs      18   0400   WBC  7.1   RBC 3.62*   HGB  10.9*   HCT  33.6*   PLT  276   GRANS  57   LYMPH  30   EOS  5          RADIOLOGY :      MRI KNEE 7/30 - IMPRESSION:     1. Soft tissue puncture wound to the anterior pretibial soft tissues at the  level of the tibial tuberosity with adjacent findings of cellulitis and  ill-defined subcutaneous phlegmon within the prepatellar soft tissues.     2. Mild reactive myositis involving the proximal anterior fibers of the tibialis  anterior muscle belly. No rim-enhancing intramuscular fluid collection.     3. No evidence of significant joint effusion or synovitis. Physiologic amount of  fluid within the knee joint.     4. No evidence of bone marrow signal abnormality to suggest osteomyelitis.       Maxime Brandt MD  August 3, 2018  [address] [place] [address] [facility]  [phone]

## 2018-08-03 NOTE — PROGRESS NOTES
Bedside report given by Mehdi Sanchez RN. Pt in bed w HOB elevated, bed locked at lowest position, call bell in reach. Pt wound vac to R knee is c/d/i and on continuous suction at rate of 125. Canister has 40ml of sanguinous fluid. Hope Angle has purposeful movement in all extremities. Positive pulses b/ to lower extremities. IV site to RFA is c/d/i, no swelling, erythema or infiltration noted at this time. Pt shows no signs of distress at this time. Will continue to monitor. 1800 Pt remained stable during shift.

## 2018-08-03 NOTE — PROGRESS NOTES
Problem: Mobility Impaired (Adult and Pediatric)  Goal: *Acute Goals and Plan of Care (Insert Text)  Physical Therapy Goals   Initiated 8/1/2018 and to be accomplished within 7 days. 1.  Patient will ambulate 300 feet with modified independence using LRAD in order to prepare for safe negotiation of their environment. 2.  Patient will ascend/descend 1 steps without handrail use with modified independence in order to prepare for safe exit/entry into their home environment. Outcome: Not Met  PHYSICAL THERAPY: Daily Treatment Note/Discharge   INPATIENT: Self-pay: Hospital Day: 6     Patient: Alexandra Gamboa (70 y.o. male)    Date: 8/3/2018  Primary Diagnosis: Cellulitis of right leg  pre patellar bursa   Procedure(s) (LRB):  INCISION AND DRAINAGE RIGHT PRE PATELLAR BURSA (Right), 3 Days Post-Op,   Precautions:    WBAT      Chart, physical therapy assessment, plan of care and goals were reviewed. PLOF:Independent    ASSESSMENT:  Pt is independent with all aspects of bed mobility, transfers and gait. Gait training X500 ft including 5 stairs with verbal cues for safety and sequencing, pt performed mod I with single hand rail and managing wound vac. Pt with decreased knee flexion on R during swing resulting in circumduction of R LE, however pt stable during gait training even with challenges to balance without assistive device. Pt educated on activity and positioning recommendations, as well as home safety. Pt has met acute care goals and will be discharged at this time.     EDUCATION:   Education:  Patient was educated on the following topics: activity and positioning   Barriers to Learning/Limitations: None  Compensate with: visual, verbal, tactile, kinesthetic cues/model    Progression toward goals:  [x]      Goals met  []      Improving appropriately and progressing toward goals  []      Improving slowly and progressing toward goals  []      Not making progress toward goals and plan of care will be adjusted PLAN:  Patient will be discharged from physical therapy at this time. Rationale for discharge:  [x] Goals Achieved  [] [address]  [] Patient not participating in therapy  [] Other:  Discharge Recommendations:  Home Health  Further Equipment Recommendations for Discharge:  N/A  Factors which may impact discharge planning: lack of insurance     SUBJECTIVE:   Patient stated I do 50 push ups before I get out of bed in the morning.     OBJECTIVE DATA SUMMARY:   Critical Behavior:  Neurologic State: Alert  Orientation Level: Oriented X4  Cognition: Follows commands  Safety/Judgement: Fall prevention, Awareness of environment      Functional Mobility:      Functional Status      Indep   (I)   Mod I   Super-vision   Min A   Mod A   Max A   Total A   Assist x2 Verbal cues Additional time Not tested   Comments   Rolling [x]  []  [] []    []    []  []  [] [] [] []    Supine to sit [x]  []  [] []  []  []  []  [] [] [] []    Sit to supine [x]  []  [] []  []  []  []  [] [] [] []    Sit to stand [x]  []  [] []  []  []  []  [] [] [] []    Stand to sit [x]  []  [] []  []  []  []  [] [] [] []    Bed to chair transfers [x]  []  [] []  []  []  []  [] [] [] []        Balance    Good   Fair   Poor   Unable   Not tested   Comments   Sitting static [x]  []  []  []  []    Sitting dynamic [x]  []  []  []  []    Standing static [x]  []  []  []  []    Standing dynamic [x]  []  []  []  []          Mobility/Gait:   Level of Assistance: Independent  Assistive Device: none  Distance Ambulated: 500 feet     Left Lower Extremity: WBAT  Right Lower Extremity: WBAT  Base of Support: center of gravity altered  Speed/Nasima: fluctuations  Step Length: left shortened  Swing Pattern: right asymmetrical  Stance: left increased  Gait Abnormalities: circumduction and decreased step clearance    Stairs:   Level of Assistance: independent  Assistive Device: none  Rail Use: right  Number of Stairs: 5    Vital Signs  Temp: 97.6 °F (36.4 °C) Pulse (Heart Rate): 68     BP: 125/86     Resp Rate: 18     O2 Sat (%): 96 %  Pain:  Pre treatment pain level:9  Post treatment pain level:  Pain Scale 1: Numeric (0 - 10)  Pain Intensity 1: 9  Pain Location 1: Knee  Pain Orientation 1: Right  Pain Description 1: Throbbing  Pain Intervention(s) 1: Medication (see MAR)  Activity Tolerance:   Good      After treatment:     Patient left in no apparent distress in bed  Call bell left within reach      Thanh [place], PTA   Time Calculation: 31 mins

## 2018-08-03 NOTE — INTERDISCIPLINARY ROUNDS
IDR Summary      Patient: Chance Perdue MRN: [id number]    Age: 65 y.o.  : 1955     Admit Diagnosis: Cellulitis of right leg  pre patellar bursa        DIET status: Regular     Lines/Tubes:   IV: YES   Needed: YES  Moon: NO  Needed:NO  Central Line: NO Needed: NO      VTE Prophylaxis: Chemical    Mobility needs: Yes     PT ordered:  YES PT eval on chart: YES    OT ordered:  NO OT eval on chart: NO      ST ordered:  NO ST eval on chart:  NO     Disposition/Care Management:  Discharge plan: Home with [address] ordered? YES     Recommended DME from PT/OT:      DME ordered? NO     SNF- has patient been matched? NO    Accepting bed? NO   Does patient require insurance auth?   NO      Barriers to discharge:   Financial concerns:No   PCP: An Benito MD    : NO  Interventions:       LOS: 5 days     Expected days until discharge: Home with [facility]           Signed:     KRISTYN Ferguson  [address]  Hospitalist Division  Pager:  [phone]  Office:  [phone]

## 2018-08-03 NOTE — PROGRESS NOTES
Problem: Falls - Risk of  Goal: *Absence of Falls  Document Yoko Fall Risk and appropriate interventions in the flowsheet. Outcome: Progressing Towards Goal  Fall Risk Interventions:  Mobility Interventions: Communicate number of staff needed for ambulation/transfer         Medication Interventions: Patient to call before getting OOB    Elimination Interventions: Call light in reach    History of Falls Interventions: Consult care management for discharge planning, Door open when patient unattended, Room close to nurse's station, Evaluate medications/consider consulting pharmacy        Problem: Pressure Injury - Risk of  Goal: *Prevention of pressure injury  Document Erick Scale and appropriate interventions in the flowsheet. Outcome: Progressing Towards Goal  Pressure Injury Interventions:             Activity Interventions: Pressure redistribution bed/mattress(bed type)    Mobility Interventions: HOB 30 degrees or less, Pressure redistribution bed/mattress (bed type)    Nutrition Interventions: Document food/fluid/supplement intake    Friction and Shear Interventions: Foam dressings/transparent film/skin sealants, Lift team/patient mobility team, Transfer aides:transfer board/Carri lift/ceiling lift, Transferring/repositioning devices

## 2018-08-03 NOTE — PROGRESS NOTES
Problem: Falls - Risk of  Goal: *Absence of Falls  Document Yoko Fall Risk and appropriate interventions in the flowsheet. Outcome: Progressing Towards Goal  Fall Risk Interventions:  Mobility Interventions: Communicate number of staff needed for ambulation/transfer, Patient to call before getting OOB, PT Consult for mobility concerns, Strengthening exercises (ROM-active/passive), Utilize gait belt for transfers/ambulation         Medication Interventions: Assess postural VS orthostatic hypotension, Evaluate medications/consider consulting pharmacy, Patient to call before getting OOB, Teach patient to arise slowly    Elimination Interventions: Call light in reach, Patient to call for help with toileting needs, Toileting schedule/hourly rounds    History of Falls Interventions: Consult care management for discharge planning, Door open when patient unattended, Room close to nurse's station, Evaluate medications/consider consulting pharmacy        Problem: Pressure Injury - Risk of  Goal: *Prevention of pressure injury  Document Erick Scale and appropriate interventions in the flowsheet. Outcome: Progressing Towards Goal  Pressure Injury Interventions: Activity Interventions: Pressure redistribution bed/mattress(bed type), PT/OT evaluation, Increase time out of bed    Mobility Interventions: HOB 30 degrees or less, Pressure redistribution bed/mattress (bed type), PT/OT evaluation, Turn and reposition approx.  every two hours(pillow and wedges)    Nutrition Interventions: Document food/fluid/supplement intake    Friction and Shear Interventions: Foam dressings/transparent film/skin sealants, Lift team/patient mobility team, Transfer aides:transfer board/Carri lift/ceiling lift, Transferring/repositioning devices

## 2018-08-03 NOTE — PROGRESS NOTES
[facility]  Hospitalist Division        Inpatient Daily Progress Note    Daily progress Note    Patient: Trina Medina MRN: [id number]  CSN: [id number]    YOB: 1955  Age: 65 y.o. Sex: male    DOA: 7/29/2018 LOS:  LOS: 5 days                    Chief Complaint:  Cellulitis right leg    Interval History: 64 y/o male with hx of Basal Cell Skin CA presented to the ED on 7/27 for wound and redness underneath right knee. Pt stated this happened when he was painting the house, he got down on his knee and a nail punched in. Xray of knee w/o abnormality. In the ED he was prescribed Bactrim and Keflex and discharged. He presented back to the ED on7/29 for wound check- pt noticed edema and redness has progressed above his knee and down to his ankle. Lactic acid normal, blood culture collected x 1 NGTD, wound culture with moderate WBC's, no organisms seen. Pt started on IV Abx and admitted for further management of care. Wound care following. PVL RLE negative. MRI right knee soft tissue puncture wound to anterior pretibial soft tissues at level of tibial tuberosity w/ adjacent findings of cellulitis and ill-defined subcutaneous phlegmon w/in prepatellar soft tissue. Pt spiked temp of 102.4 on 7/30- UA negative, repeat blood cultures x 2 pending and CXR negative. Ortho consulted. S/p  I&D RLE for pre-patellar bursa 7/31 with wound vac placed. Culture with many staph aureus, anaerobic culture negative. Assessment/Plan:     Patient Active Problem List   Diagnosis Code    Cellulitis of right leg L03.115    Infected wound T14. 8XXA, L08.9    Tobacco abuse Z72.0       A/P:  Cellulitis right leg  - seen in ED on 7/27- xray normal, d/c with Keflex and Bactrim  - presented back to ED on 7/29 for wound check, now with edema and progression of erythema  - blood culture collected 7/29 x 1 NGTD/  - wound culture with moderate WBC's, no organisms seen  - PVL RLE negative - MRI right knee soft tissue puncture wound to anterior pretibial soft tissues at level of tibial tuberosity w/ adjacent findings of cellulitis and ill-defined subcutaneous phlegmon w/in prepatellar soft tissue  - ortho and ID following, appreciate input   - s/p I&D RLE for pre-patellar bursa 7/31 with wound vac placed, culture with many staph aureus, anaerobic culture negative  - per ID, Vanc not in therapeutic range so switching to IV Ancef - will need to stay on IV abx an additional 2-3 days  - per ortho- have pt lay prone to facilitate output  - CRP 12, ESR 96  - PT following    Infected Wound  - received Tetanus shot on 7/27  - wound culture with moderate WBC's, no organisms seen  - wound care consult, appreciate input  - continue IV abx per ID    Fever  - temp of 102.4 on 7/30  - CXR negative  - WBC normal  - UA negative, urine culture negative  - repeat blood cultures 7/30 NGTD x 2  - repeat wound culture 7/31 with staph aurues  - ID consulted- appreciate input  - s/p I&D w/ wound vac placed on 7/31: culture with many staph aureus, anaerobic culture negative  - resolved    Tobacco abuse  - declined nicoderm patch  - educated on cessation    Hx of Basal Cell Skin CA  - f/u outpatient    DVT Prophylaxis  - Lovenox daily    Disposition  - continue IV abx for maybe 2 more days then switch to oral - following ID recommendations  - once on oral abx can likely d/c home      Frank Edwards [place] [id number]  Pager:  [phone]  Office:  [phone]        Subjective: Interval notes reviewed. No events overnight. Worked with PT this morning. Still with edema and erythema RLE, pt tried to lay prone as recommended by Ortho but states it caused his back and neck to hurt.       Objective:      Visit Vitals    /86 (BP 1 Location: Right arm, BP Patient Position: At rest)    Pulse 68    Temp 97.6 °F (36.4 °C)    Resp 18    Ht 5' 9\" (1.753 m)    Wt 81.2 kg (179 lb)    SpO2 96%    BMI 26.43 kg/m2           Physical Exam:  General appearance: alert, cooperative, no distress, appears stated age  Head: Normocephalic, without obvious abnormality, atraumatic  Lungs: clear to auscultation bilaterally  Heart: regular rate and rhythm, S1, S2 normal, no murmur, click, rub or gallop  Abdomen: soft, non tender, non distended. Normoactive bowel sounds. Extremities:wound vac to anterior right knee itntact with minimal sanguineous drainage, erythema noted to RLE, 1-2+ edema RLE extending up to right thigh, DP palpable bilaterally  Skin: see above  Neurologic: Grossly normal  PSY: Mood and affect normal, appropriately behaved        Intake and Output:  Current Shift:     Last three shifts:  08/01 1901 - 08/03 0700  In: 3573.3 [P.O.:1640; I.V.:1933.3]  Out: 9845 [Urine:1325; Drains:40]    No results found for this or any previous visit (from the past 24 hour(s)). Lab Results   Component Value Date/Time    Glucose 95 08/02/2018 04:00 AM    Glucose 118 (H) 07/31/2018 05:42 AM    Glucose 112 (H) 07/30/2018 05:05 AM    Glucose 114 (H) 07/29/2018 08:02 PM    Glucose 97 07/29/2018 01:50 PM        Imaging:  No results found.     Medication List Reviewed:  Current Facility-Administered Medications   Medication Dose Route Frequency    ceFAZolin (ANCEF) 2g IVPB in 50 mL D5W  2 g IntraVENous Q8H    lidocaine (XYLOCAINE) 4 % (40 mg/mL) topical solution   Topical Q TU, TH & SAT    morphine injection 2 mg  2 mg IntraVENous Q3H PRN    oxyCODONE-acetaminophen (PERCOCET 10)  mg per tablet 2 Tab  2 Tab Oral Q6H PRN    sodium chloride (NS) flush 5-10 mL  5-10 mL IntraVENous Q8H    sodium chloride (NS) flush 5-10 mL  5-10 mL IntraVENous PRN    enoxaparin (LOVENOX) injection 40 mg  40 mg SubCUTAneous Q24H

## 2018-08-04 LAB
BACTERIA SPEC CULT: NORMAL
SERVICE CMNT-IMP: NORMAL

## 2018-08-04 PROCEDURE — 74011250636 HC RX REV CODE- 250/636: Performed by: INTERNAL MEDICINE

## 2018-08-04 PROCEDURE — 74011000250 HC RX REV CODE- 250: Performed by: NURSE PRACTITIONER

## 2018-08-04 PROCEDURE — 74011250636 HC RX REV CODE- 250/636: Performed by: ORTHOPAEDIC SURGERY

## 2018-08-04 PROCEDURE — 77030019952 HC CANSTR VAC ASST KCON -B

## 2018-08-04 PROCEDURE — 77030019934 HC DRSG VAC ASST KCON -B

## 2018-08-04 PROCEDURE — 77030018846 HC SOL IRR STRL H20 ICUM -A

## 2018-08-04 PROCEDURE — 74011250637 HC RX REV CODE- 250/637: Performed by: ORTHOPAEDIC SURGERY

## 2018-08-04 PROCEDURE — 65270000029 HC RM PRIVATE

## 2018-08-04 RX ADMIN — MORPHINE SULFATE 2 MG: 4 INJECTION INTRAVENOUS at 04:43

## 2018-08-04 RX ADMIN — CEFAZOLIN SODIUM 2 G: 2 SOLUTION INTRAVENOUS at 12:38

## 2018-08-04 RX ADMIN — CEFAZOLIN SODIUM 2 G: 2 SOLUTION INTRAVENOUS at 20:46

## 2018-08-04 RX ADMIN — Medication 10 ML: at 22:30

## 2018-08-04 RX ADMIN — LIDOCAINE HYDROCHLORIDE: 40 SOLUTION TOPICAL at 17:11

## 2018-08-04 RX ADMIN — OXYCODONE HYDROCHLORIDE AND ACETAMINOPHEN 2 TABLET: 10; 325 TABLET ORAL at 02:31

## 2018-08-04 RX ADMIN — MORPHINE SULFATE 2 MG: 4 INJECTION INTRAVENOUS at 22:30

## 2018-08-04 RX ADMIN — ENOXAPARIN SODIUM 40 MG: 100 INJECTION SUBCUTANEOUS at 20:46

## 2018-08-04 RX ADMIN — OXYCODONE HYDROCHLORIDE AND ACETAMINOPHEN 2 TABLET: 10; 325 TABLET ORAL at 08:44

## 2018-08-04 RX ADMIN — CEFAZOLIN SODIUM 2 G: 2 SOLUTION INTRAVENOUS at 03:48

## 2018-08-04 RX ADMIN — MORPHINE SULFATE 2 MG: 4 INJECTION INTRAVENOUS at 17:53

## 2018-08-04 RX ADMIN — MORPHINE SULFATE 2 MG: 4 INJECTION INTRAVENOUS at 12:57

## 2018-08-04 RX ADMIN — OXYCODONE HYDROCHLORIDE AND ACETAMINOPHEN 2 TABLET: 10; 325 TABLET ORAL at 20:46

## 2018-08-04 RX ADMIN — OXYCODONE HYDROCHLORIDE AND ACETAMINOPHEN 2 TABLET: 10; 325 TABLET ORAL at 15:05

## 2018-08-04 NOTE — PROGRESS NOTES
Problem: Falls - Risk of  Goal: *Absence of Falls  Document Yoko Fall Risk and appropriate interventions in the flowsheet. Outcome: Progressing Towards Goal  Fall Risk Interventions:  Mobility Interventions: Patient to call before getting OOB         Medication Interventions: Evaluate medications/consider consulting pharmacy, Patient to call before getting OOB, Teach patient to arise slowly    Elimination Interventions: Patient to call for help with toileting needs, Toilet paper/wipes in reach, Toileting schedule/hourly rounds, Urinal in reach, Call light in reach    History of Falls Interventions: Consult care management for discharge planning, Evaluate medications/consider consulting pharmacy, Room close to nurse's station        Problem: Pressure Injury - Risk of  Goal: *Prevention of pressure injury  Document Erick Scale and appropriate interventions in the flowsheet. Outcome: Progressing Towards Goal  Pressure Injury Interventions:             Activity Interventions: Increase time out of bed, Pressure redistribution bed/mattress(bed type)    Mobility Interventions: HOB 30 degrees or less, Pressure redistribution bed/mattress (bed type)    Nutrition Interventions: Document food/fluid/supplement intake, Offer support with meals,snacks and hydration    Friction and Shear Interventions: Apply protective barrier, creams and emollients, Minimize layers, HOB 30 degrees or less

## 2018-08-04 NOTE — PROGRESS NOTES
Orthopaedics    Patient without new complaints status post INCISION AND DRAINAGE LOWER EXTREMITY for Cellulitis of right leg  pre patellar bursa 7/29/2018. No new changes. Visit Vitals    /79 (BP Patient Position: Sitting)    Pulse 65    Temp 98.1 °F (36.7 °C)    Resp 18    Ht 5' 9\" (1.753 m)    Wt 81.2 kg (179 lb)    SpO2 96%    BMI 26.43 kg/m2       CBC w/Diff    Lab Results   Component Value Date/Time    WBC 7.1 08/02/2018 04:00 AM    RBC 3.62 (L) 08/02/2018 04:00 AM    HCT 33.6 (L) 08/02/2018 04:00 AM    MCV 92.8 08/02/2018 04:00 AM    MCH 30.1 08/02/2018 04:00 AM    MCHC 32.4 08/02/2018 04:00 AM    RDW 13.7 08/02/2018 04:00 AM    Lab Results   Component Value Date/Time    MONOS 8 08/02/2018 04:00 AM    EOS 5 08/02/2018 04:00 AM    BASOS 0 08/02/2018 04:00 AM    RDW 13.7 08/02/2018 04:00 AM          Physical exam:  Dressing dry. NVI Bilateral Lower Extremities. Calves soft and nontender. Assessment:  Status post INCISION AND DRAINAGE LOWER EXTREMITY for Cellulitis of right leg  pre patellar bursa 7/29/2018,  progressing. PLAN:  Mobilize. Continue P.T. Discharge Planning.     Ryan Ramires MD  August 4, 2018

## 2018-08-04 NOTE — PROGRESS NOTES
Bedside shift change report given to Christopher Rios RN (oncoming nurse) by Laura Leal RN (offgoing nurse). Report included the following information SBAR, Kardex, OR Summary, Intake/Output, MAR and Recent Results. Patient resting in bed, no complaints at this time. 2006: Due meds given. 2042: Percocet given for pain. 2243: Morphine given for pain, ice pack applied, RLE elevated. Bedside shift change report given to Brad Manzano RN (oncoming nurse) by Christopher Rios RN (offgoing nurse). Report included the following information SBAR, Kardex, OR Summary, Intake/Output, MAR and Recent Results.

## 2018-08-04 NOTE — PROGRESS NOTES
Problem: Falls - Risk of  Goal: *Absence of Falls  Document Yoko Fall Risk and appropriate interventions in the flowsheet. Outcome: Progressing Towards Goal  Fall Risk Interventions:  Mobility Interventions: Patient to call before getting OOB, Utilize walker, cane, or other assistive device         Medication Interventions: Patient to call before getting OOB, Teach patient to arise slowly    Elimination Interventions: Call light in reach, Patient to call for help with toileting needs, Toileting schedule/hourly rounds    History of Falls Interventions: Consult care management for discharge planning, Investigate reason for fall, Room close to nurse's station        Problem: Pressure Injury - Risk of  Goal: *Prevention of pressure injury  Document Erick Scale and appropriate interventions in the flowsheet. Outcome: Progressing Towards Goal  Pressure Injury Interventions:             Activity Interventions: Pressure redistribution bed/mattress(bed type), Increase time out of bed    Mobility Interventions: Pressure redistribution bed/mattress (bed type)    Nutrition Interventions: Document food/fluid/supplement intake, Discuss nutritional consult with provider, Offer support with meals,snacks and hydration    Friction and Shear Interventions: Minimize layers

## 2018-08-04 NOTE — PROGRESS NOTES
Bedside shift change report given to Reji Tian RN (oncoming nurse) by Marito Avendano RN (offgoing nurse). Report included the following information SBAR, Kardex, OR Summary, Intake/Output, MAR, Accordion and Recent Results. 2054: Wound vac intact, changed today, Percocet given, Lovenox given, VTE education provided. 0032: Assisted to chair, BLE elevated. 0402: Assisted to bathroom, due meds given. Bedside shift change report given to Marito Avendano RN (oncoming nurse) by Reji Tian RN (offgoing nurse). Report included the following information SBAR, Kardex, OR Summary, Procedure Summary, Intake/Output, MAR and Recent Results.

## 2018-08-04 NOTE — PROGRESS NOTES
[facility]  Hospitalist Division        Inpatient Daily Progress Note    Daily progress Note    Patient: Kiara Haddad MRN: [id number]  CSN: [id number]    YOB: 1955  Age: 65 y.o. Sex: male    DOA: 7/29/2018 LOS:  LOS: 6 days                    Chief Complaint:  Cellulitis right leg    Interval History: 66 y/o male with hx of Basal Cell Skin CA presented to the ED on 7/27 for wound and redness underneath right knee. Pt stated this happened when he was painting the house, he got down on his knee and a nail punched in. Xray of knee w/o abnormality. In the ED he was prescribed Bactrim and Keflex and discharged. He presented back to the ED on7/29 for wound check- pt noticed edema and redness has progressed above his knee and down to his ankle. Lactic acid normal, blood culture collected x 1 NGTD, wound culture with moderate WBC's, no organisms seen. Pt started on IV Abx and admitted for further management of care. Wound care following. PVL RLE negative. MRI right knee soft tissue puncture wound to anterior pretibial soft tissues at level of tibial tuberosity w/ adjacent findings of cellulitis and ill-defined subcutaneous phlegmon w/in prepatellar soft tissue. Pt spiked temp of 102.4 on 7/30- UA negative, repeat blood cultures x 2 pending and CXR negative. Ortho consulted. S/p  I&D RLE for pre-patellar bursa 7/31 with wound vac placed. Culture with many staph aureus, anaerobic culture negative. Erythema and edema slightly improved on 8/4 - plan is to continue IV abx then switch to oral, monitor in hospital for one day while on oral abx to make sure symptoms do not worsen, then d/c home with wound vac. Assessment/Plan:     Patient Active Problem List   Diagnosis Code    Cellulitis of right leg L03.115    Infected wound T14. 8XXA, L08.9    Tobacco abuse Z72.0       A/P:  Cellulitis right leg  - seen in ED on 7/27- xray normal, d/c with Keflex and Bactrim  - presented back to ED on 7/29 for wound check, now with edema and progression of erythema  - blood culture collected 7/29 x 1 NGTD/  - wound culture with moderate WBC's, no organisms seen  - PVL RLE negative   - MRI right knee soft tissue puncture wound to anterior pretibial soft tissues at level of tibial tuberosity w/ adjacent findings of cellulitis and ill-defined subcutaneous phlegmon w/in prepatellar soft tissue  - ortho and ID following, appreciate input   - s/p I&D RLE for pre-patellar bursa 7/31 with wound vac placed, culture with many staph aureus, anaerobic culture negative  - per ID, Vanc not in therapeutic range so switching to IV Ancef - will need to stay on IV abx an additional 2-3 days  - per ortho- have pt lay prone to facilitate output  - CRP 12, ESR 96  - PT following    Infected Wound  - received Tetanus shot on 7/27  - wound culture with moderate WBC's, no organisms seen  - wound care consult, appreciate input  - continue IV abx per ID    Fever  - temp of 102.4 on 7/30  - CXR negative  - WBC normal  - UA negative, urine culture negative  - repeat blood cultures 7/30 NGTD x 2  - repeat wound culture 7/31 with staph aurues  - ID consulted- appreciate input  - s/p I&D w/ wound vac placed on 7/31: culture with many staph aureus, anaerobic culture negative  - resolved    Tobacco abuse  - declined nicoderm patch  - educated on cessation    Hx of Basal Cell Skin CA  - f/u outpatient    DVT Prophylaxis  - Lovenox daily    Disposition  - continue IV abx for maybe 2 more days then switch to oral - following ID recommendations  - once on oral abx can likely d/c home      Jalen Ramirez [facility]  [facility]  Pager:  [phone]  Office:  [phone]        Subjective:     Erythema and edema slightly improved today. No complaints or concerns at this time.     Objective:      Visit Vitals    /60 (BP 1 Location: Right arm, BP Patient Position: At rest)  Pulse 60    Temp 97.4 °F (36.3 °C)    Resp 16    Ht 5' 9\" (1.753 m)    Wt 81.2 kg (179 lb)    SpO2 94%    BMI 26.43 kg/m2           Physical Exam:  General appearance: alert, cooperative, no distress, appears stated age  Head: Normocephalic, without obvious abnormality, atraumatic  Lungs: clear to auscultation bilaterally  Heart: regular rate and rhythm, S1, S2 normal, no murmur, click, rub or gallop  Abdomen: soft, non tender, non distended. Normoactive bowel sounds. Extremities:wound vac to anterior right knee itntact with minimal sanguineous drainage, erythema noted to RLE (improving), 1+ edema RLE extending up to right thigh, DP palpable bilaterally  Skin: see above  Neurologic: Grossly normal  PSY: Mood and affect normal, appropriately behaved        Intake and Output:  Current Shift:     Last three shifts:  08/02 1901 - 08/04 0700  In: 1930 [P.O.:1680; I.V.:250]  Out: 11 [Urine:1; Drains:10]    Recent Results (from the past 24 hour(s))   VANCOMYCIN, TROUGH    Collection Time: 08/03/18 10:34 AM   Result Value Ref Range    Vancomycin,trough 3.3 (L) 10.0 - 20.0 ug/mL    Reported dose date: 20180802      Reported dose time: 2300      Reported dose: 1000 MG UNITS           Lab Results   Component Value Date/Time    Glucose 95 08/02/2018 04:00 AM    Glucose 118 (H) 07/31/2018 05:42 AM    Glucose 112 (H) 07/30/2018 05:05 AM    Glucose 114 (H) 07/29/2018 08:02 PM    Glucose 97 07/29/2018 01:50 PM        Imaging:  No results found.     Medication List Reviewed:  Current Facility-Administered Medications   Medication Dose Route Frequency    ceFAZolin (ANCEF) 2g IVPB in 50 mL D5W  2 g IntraVENous Q8H    lidocaine (XYLOCAINE) 4 % (40 mg/mL) topical solution   Topical Q TU, TH & SAT    morphine injection 2 mg  2 mg IntraVENous Q3H PRN    oxyCODONE-acetaminophen (PERCOCET 10)  mg per tablet 2 Tab  2 Tab Oral Q6H PRN    sodium chloride (NS) flush 5-10 mL  5-10 mL IntraVENous Q8H    sodium chloride (NS) flush 5-10 mL  5-10 mL IntraVENous PRN    enoxaparin (LOVENOX) injection 40 mg  40 mg SubCUTAneous Q24H

## 2018-08-04 NOTE — PROGRESS NOTES
Physical Exam   Skin:             Bedside and Verbal shift change report given to Danika Samano RN (oncoming nurse) by Eric Oneill RN (offgoing nurse). Report included the following information SBAR, Kardex, Intake/Output and MAR.     3522 - prn meds administered for pain rated 9/10. Pt sitting up in bed. Shift assessment performed. Planned to change wound vac at 1015. Pt verbalized understanding. NAD noted, will cont to monitor. 0930 - pain reassessment performed. Pt states pain is still high & rates it 7/10. Will cont to monitor. 1238 - abx administered. Pt on phone & demanded that this nurse \"hurry up & leave so he can finish his phone call\". [phone] - pt called nurse station & requested prn meds. 1258 - prn meds administered for pain rated 9/10. Will cont to monitor. 1334 - pt resting in bed. Pt rates pain 6/10. Will cont to monitor. 1505 - prn meds administered for pain rated 9/10. Pt c/o of throbbing of leg. Encouraged pt to elevated LLE. Pt verbalized & demonstrated understanding. Will cont to monitor. 1711 - wound vac dressing change performed. Removed top dressing & soaked black sponge w/licdocaine topical. Let sit for approx 15 min. Removed 1 black sponge from the top & 1 white sponge from the inner tunnel. Cleansed w/dermal wound cleanser. Placed one white sponge & two black sponges on top of wound. Pt did not tolerate well. Will cont to monitor. 1753 - prn meds administered for pain rated 9/10 after wound vac dressing. 1918 - Bedside and Verbal shift change report given to Eric Oneill RN (oncoming nurse) by Danika Samano RN (offgoing nurse). Report included the following information SBAR, Kardex, Intake/Output and MAR.

## 2018-08-05 LAB
BACTERIA SPEC CULT: NORMAL
SERVICE CMNT-IMP: NORMAL

## 2018-08-05 PROCEDURE — 74011250637 HC RX REV CODE- 250/637: Performed by: ORTHOPAEDIC SURGERY

## 2018-08-05 PROCEDURE — 74011250637 HC RX REV CODE- 250/637: Performed by: HOSPITALIST

## 2018-08-05 PROCEDURE — 74011250636 HC RX REV CODE- 250/636: Performed by: INTERNAL MEDICINE

## 2018-08-05 PROCEDURE — 65270000029 HC RM PRIVATE

## 2018-08-05 PROCEDURE — 74011250636 HC RX REV CODE- 250/636: Performed by: ORTHOPAEDIC SURGERY

## 2018-08-05 RX ORDER — HYDROCORTISONE VALERATE 2 MG/G
CREAM TOPICAL 2 TIMES DAILY
Status: DISCONTINUED | OUTPATIENT
Start: 2018-08-05 | End: 2018-08-06 | Stop reason: HOSPADM

## 2018-08-05 RX ADMIN — CEFAZOLIN SODIUM 2 G: 2 SOLUTION INTRAVENOUS at 12:21

## 2018-08-05 RX ADMIN — OXYCODONE HYDROCHLORIDE AND ACETAMINOPHEN 2 TABLET: 10; 325 TABLET ORAL at 22:07

## 2018-08-05 RX ADMIN — CEFAZOLIN SODIUM 2 G: 2 SOLUTION INTRAVENOUS at 03:51

## 2018-08-05 RX ADMIN — Medication 10 ML: at 03:52

## 2018-08-05 RX ADMIN — OXYCODONE HYDROCHLORIDE AND ACETAMINOPHEN 2 TABLET: 10; 325 TABLET ORAL at 16:05

## 2018-08-05 RX ADMIN — CEFAZOLIN SODIUM 2 G: 2 SOLUTION INTRAVENOUS at 19:43

## 2018-08-05 RX ADMIN — ENOXAPARIN SODIUM 40 MG: 100 INJECTION SUBCUTANEOUS at 20:59

## 2018-08-05 RX ADMIN — MORPHINE SULFATE 2 MG: 4 INJECTION INTRAVENOUS at 18:32

## 2018-08-05 RX ADMIN — MORPHINE SULFATE 2 MG: 4 INJECTION INTRAVENOUS at 12:27

## 2018-08-05 RX ADMIN — Medication 10 ML: at 21:01

## 2018-08-05 RX ADMIN — MORPHINE SULFATE 2 MG: 4 INJECTION INTRAVENOUS at 06:44

## 2018-08-05 RX ADMIN — OXYCODONE HYDROCHLORIDE AND ACETAMINOPHEN 2 TABLET: 10; 325 TABLET ORAL at 09:49

## 2018-08-05 RX ADMIN — OXYCODONE HYDROCHLORIDE AND ACETAMINOPHEN 2 TABLET: 10; 325 TABLET ORAL at 03:51

## 2018-08-05 RX ADMIN — HYDROCORTISONE VALERATE: 2 CREAM TOPICAL at 15:00

## 2018-08-05 RX ADMIN — MORPHINE SULFATE 2 MG: 4 INJECTION INTRAVENOUS at 23:01

## 2018-08-05 NOTE — PROGRESS NOTES
Bedside shift change report given to Dandy Yancey RN (oncoming nurse) by Claudia Nguyen RN (offgoing nurse). Report included the following information SBAR, Kardex, OR Summary, Intake/Output, MAR and Recent Results. Wound vac drainage too scant to measure, <10 ml. Patient resting in bed. 2003: Patient voiced concern regarding discharge, states, \"I don't think I can do this at home\" informed patient case management will discuss home health options with patient prior to discharge. 0208: Patient asleep, has been ambulating with standby assist. Patient voiding without difficulty. Wound vac intact, erythema and edema improving. Bedside shift change report given to Margret Arnold RN (oncoming nurse) by Dandy Yancey RN (offgoing nurse). Report included the following information SBAR, Kardex, OR Summary, Intake/Output, MAR and Recent Results.

## 2018-08-05 NOTE — PROGRESS NOTES
Problem: Falls - Risk of  Goal: *Absence of Falls  Document Yoko Fall Risk and appropriate interventions in the flowsheet. Outcome: Progressing Towards Goal  Fall Risk Interventions:  Mobility Interventions: Communicate number of staff needed for ambulation/transfer, Patient to call before getting OOB, Utilize walker, cane, or other assistive device         Medication Interventions: Patient to call before getting OOB, Teach patient to arise slowly    Elimination Interventions: Call light in reach, Patient to call for help with toileting needs, Toileting schedule/hourly rounds    History of Falls Interventions: Evaluate medications/consider consulting pharmacy, Investigate reason for fall        Problem: Pressure Injury - Risk of  Goal: *Prevention of pressure injury  Document Erick Scale and appropriate interventions in the flowsheet. Outcome: Progressing Towards Goal  Pressure Injury Interventions:             Activity Interventions: Pressure redistribution bed/mattress(bed type), Increase time out of bed    Mobility Interventions: Pressure redistribution bed/mattress (bed type)    Nutrition Interventions: Document food/fluid/supplement intake, Discuss nutritional consult with provider, Offer support with meals,snacks and hydration    Friction and Shear Interventions: Feet elevated on foot rest, Apply protective barrier, creams and emollients

## 2018-08-05 NOTE — PROGRESS NOTES
Physical Exam   Skin:             Bedside and Verbal shift change report given to Beckie Magallon RN (oncoming nurse) by Stephanie Webb RN (offgoing nurse). Report included the following information SBAR, Kardex, Intake/Output and MAR.     4211 - shift assessment performed. Pt sitting up in bed watching tv, NAD noted, will cont to monitor. 7493 - prn meds administered for pain rated 7/10. Pt sitting w/HOB elevated. Pt states \"I want to get out of here already\". Will notify MD.     1031 - pt sleeping in bed. Will cont to monitor. 1227 - prn meds administered to pt. Will cont to monitor. 1315 - pt sleeping. NAD noted, will cont to monitor. 1500 - cortisone cream in room from pharmacy. Pt administered to face. NAD noted, will cont to monitor. 1605 - IV prn meds administered for pain rated 7/10. Will cont to monitor. 1730 - pain reassessment performed. Pt rates pain 6/10. States \"I just want to get out of here & smoke a cigarette\". Educated pt on smoking cessation options & asked if pt would like nurse to consult MD for nicotine patch. Pt verbalized understanding & refused nicotine patch. 1832 - PO prn meds administered for pain rated 8/10. Will cont to monitor. Bedside and Verbal shift change report given to Stephanie Webb RN (oncoming nurse) by Beckie Magallon RN (offgoing nurse). Report included the following information SBAR, Kardex, Intake/Output and MAR.

## 2018-08-05 NOTE — PROGRESS NOTES
Orthopaedics    Patient without new complaints status post INCISION AND DRAINAGE LOWER EXTREMITY for Cellulitis of right leg  pre patellar bursa 7/29/2018. No new changes. Visit Vitals    /73 (BP 1 Location: Right arm, BP Patient Position: At rest)    Pulse 60    Temp 97.9 °F (36.6 °C)    Resp 16    Ht 5' 9\" (1.753 m)    Wt 81.2 kg (179 lb)    SpO2 96%    BMI 26.43 kg/m2       CBC w/Diff    Lab Results   Component Value Date/Time    WBC 7.1 08/02/2018 04:00 AM    RBC 3.62 (L) 08/02/2018 04:00 AM    HCT 33.6 (L) 08/02/2018 04:00 AM    MCV 92.8 08/02/2018 04:00 AM    MCH 30.1 08/02/2018 04:00 AM    MCHC 32.4 08/02/2018 04:00 AM    RDW 13.7 08/02/2018 04:00 AM    Lab Results   Component Value Date/Time    MONOS 8 08/02/2018 04:00 AM    EOS 5 08/02/2018 04:00 AM    BASOS 0 08/02/2018 04:00 AM    RDW 13.7 08/02/2018 04:00 AM          Physical exam:  Dressing dry. NVI Bilateral Lower Extremities. Calves soft and nontender. Notably improved erythema and edema Right LEG, Vac change yesterday       Assessment:  Status post INCISION AND DRAINAGE LOWER EXTREMITY for Cellulitis of right leg  pre patellar bursa 7/29/2018,  progressing. PLAN:  Mobilize. Continue P.T.    Discharge Planning hopefully tomorrow on VAC and PO ABX per ID    Mariana Trujillo MD  August 5, 2018

## 2018-08-05 NOTE — PROGRESS NOTES
Nutrition initial assessment/Plan of care      RECOMMENDATIONS:   1. Regular Diet  2. Monitor weight and PO intake  3. RD to follow     GOALS:   1. PO intake meets >75% of protein/calorie needs by 8/12  2. Weight Maintenance (+/- 1-2 lb) by  8/12    ASSESSMENT:   Wt status is classified as overweight per BMI of 26.4. Adequate PO intake. Labs noted. Nutrition recommendations listed. RD to follow. Nutrition Diagnoses:   Increased nutrient needs related wound healing as evidenced by a skin wound with infection    Nutrition Risk:  [] High  [] Moderate [x]  Low    SUBJECTIVE/OBJECTIVE:    Pt admitted for cellulitis of R leg. PMHx including skin cancer. Pt seen in room w/ wound vac in place. Denies having any food allergies or problems chewing/swallowing; stable weight PTA. Reports having a good appetite and consuming 2 good meals per day with a snack in the AM. Does not currently follow any type of special diet at home. Discussed importance of having a viable source of protein with each meal to promote wound healing. Will monitor. Information Obtained from:    [x] Chart Review   [x] Patient   [] Family/Caregiver   [] Nurse/Physician   [] Interdisciplinary Meeting/Rounds      Diet: Regular Diet  Medications: [x] Reviewed    Allergies: [x] Reviewed   Encounter Diagnoses     ICD-10-CM ICD-9-CM   1. Cellulitis of right leg L03.115 682.6   2.  Failure of outpatient treatment Z78.9 V49.89     Past Medical History:   Diagnosis Date    Skin cancer       Labs:  Lab Results   Component Value Date/Time    Sodium 140 08/02/2018 04:00 AM    Potassium 5.0 08/02/2018 04:00 AM    Chloride 105 08/02/2018 04:00 AM    CO2 28 08/02/2018 04:00 AM    Anion gap 7 08/02/2018 04:00 AM    Glucose 95 08/02/2018 04:00 AM    BUN 16 08/02/2018 04:00 AM    Creatinine 0.70 08/02/2018 04:00 AM    Calcium 8.3 (L) 08/02/2018 04:00 AM     Anthropometrics: BMI (calculated): 26.4  Last 3 Recorded Weights in this Encounter    07/29/18 9980 Weight: 81.2 kg (179 lb)    Ht Readings from Last 1 Encounters:   07/29/18 5' 9\" (1.753 m)     Weight Metrics 7/29/2018 7/27/2018   Weight 179 lb 179 lb   BMI 26.43 kg/m2 26.43 kg/m2       Patient Vitals for the past 100 hrs:   % Diet Eaten   08/05/18 0841 100 %   08/04/18 0911 100 %   08/03/18 1800 100 %   08/03/18 1305 100 %   08/03/18 0920 100 %   08/02/18 1830 100 %   08/02/18 1221 100 %   08/02/18 1041 90 %   08/01/18 1830 80 %   08/01/18 1230 70 %       IBW: 160 lb %IBW: 112% UBW: 175-180 lb   [] Weight Loss [] Weight Gain [x] Weight Stable    Estimated Nutrition Needs: [x] MSJ  [] Other:  Calories: 1945-4780 kcal Based on:   [x] Actual BW    Protein:   81-98 g Based on:   [x] Actual BW    Fluid:       8485-5777 ml Based on:   [] Actual BW      [x] No Cultural, Restoration or ethnic dietary need identified.     [] Cultural, Restoration and ethnic food preferences identified and addressed     Wt Status:  [] Normal (18.6 - 24.9) [] Underweight (<18.5) [x] Overweight (25 - 29.9) [] Mild Obesity (30 - 34.9)  [] Moderate Obesity (35 - 39.9) [] Morbid Obesity (40+)       Nutrition Problems Identified:   [] Suboptimal PO intake   [] Food Allergies  [] Difficulty chewing/swallowing/poor dentition  [] Constipation/Diarrhea   [] Nausea/Vomiting   [] None  [x] Other: Wound healing    Plan:   [] Therapeutic Diet  []  Obtained/adjusted food preferences/tolerances and/or snacks options   []  Supplements added   [] Occupational therapy following for feeding techniques  []  HS snack added   []  Modify diet texture   []  Modify diet for food allergies   []  Educate patient   []  Assist with menu selection   [x]  Monitor PO intake on meal rounds   [x]  Continue inpatient monitoring and intervention   []  Participated in discharge planning/Interdisciplinary rounds/Team meetings   []  Other:     Education Needs:   [] Not appropriate for teaching at this time due to:   [x] Identified and addressed    Nutrition Monitoring and Evaluation:  [x] Continue ongoing monitoring and intervention  [] Other    Dejuan Snyder - - -

## 2018-08-05 NOTE — PROGRESS NOTES
Problem: Falls - Risk of  Goal: *Absence of Falls  Document Yoko Fall Risk and appropriate interventions in the flowsheet. Outcome: Progressing Towards Goal  Fall Risk Interventions:  Mobility Interventions: Patient to call before getting OOB         Medication Interventions: Patient to call before getting OOB    Elimination Interventions: Patient to call for help with toileting needs, Call light in reach, Toileting schedule/hourly rounds, Urinal in reach    History of Falls Interventions: Consult care management for discharge planning, Evaluate medications/consider consulting pharmacy, Room close to nurse's station        Problem: Pressure Injury - Risk of  Goal: *Prevention of pressure injury  Document Erick Scale and appropriate interventions in the flowsheet. Outcome: Progressing Towards Goal  Pressure Injury Interventions:             Activity Interventions: Increase time out of bed    Mobility Interventions: HOB 30 degrees or less, Pressure redistribution bed/mattress (bed type)    Nutrition Interventions: Document food/fluid/supplement intake    Friction and Shear Interventions: Foam dressings/transparent film/skin sealants, Apply protective barrier, creams and emollients, Minimize layers, Lift team/patient mobility team

## 2018-08-05 NOTE — PROGRESS NOTES
Progress Note      Patient: Melvern Cheadle               Sex: male          DOA: 7/29/2018       YOB: 1955      Age:  65 y.o.        LOS:  LOS: 7 days             CHIEF COMPLAINT:  Skin wound with infection, improving on antibiotics and wound vac    Subjective:     Patient awake and alert  Rash on face, requesting hydrocortisone    Objective:      Visit Vitals    /73 (BP 1 Location: Right arm, BP Patient Position: At rest)    Pulse 60    Temp 97.9 °F (36.6 °C)    Resp 16    Ht 5' 9\" (1.753 m)    Wt 81.2 kg (179 lb)    SpO2 96%    BMI 26.43 kg/m2       Physical Exam:  Gen:  No distress, no complaint  Lungs:  Clear bilaterally, no wheeze or rhonchi  Heart:  Regular rate and rhythm, no murmurs or gallops  Abdomen:  Soft, non-tender, normal bowel sounds  Extremities:  Decreased erythema and tenderness       Lab/Data Reviewed:    Labs reviewed      Assessment/Plan     Principal Problem:    Cellulitis of right leg (7/29/2018)    Active Problems:    Infected wound (7/29/2018)      Tobacco abuse (7/29/2018)        Plan:  Patient has shown clear improvement with his erythema and edema involving leg. Less tenderness, all of this is encouraging.   Ultimately he will go home with wound vac when ID is comfortable with oral antibiotics  Requesting hydrocortisone for face

## 2018-08-06 ENCOUNTER — HOME HEALTH ADMISSION (OUTPATIENT)
Dept: HOME HEALTH SERVICES | Facility: HOME HEALTH | Age: 63
End: 2018-08-06
Payer: SELF-PAY

## 2018-08-06 VITALS
DIASTOLIC BLOOD PRESSURE: 74 MMHG | RESPIRATION RATE: 18 BRPM | HEIGHT: 69 IN | SYSTOLIC BLOOD PRESSURE: 110 MMHG | HEART RATE: 60 BPM | WEIGHT: 179 LBS | BODY MASS INDEX: 26.51 KG/M2 | TEMPERATURE: 97.4 F | OXYGEN SATURATION: 99 %

## 2018-08-06 LAB — CK SERPL-CCNC: 53 U/L (ref 39–308)

## 2018-08-06 PROCEDURE — 74011250636 HC RX REV CODE- 250/636: Performed by: ORTHOPAEDIC SURGERY

## 2018-08-06 PROCEDURE — C1751 CATH, INF, PER/CENT/MIDLINE: HCPCS

## 2018-08-06 PROCEDURE — 36415 COLL VENOUS BLD VENIPUNCTURE: CPT | Performed by: INTERNAL MEDICINE

## 2018-08-06 PROCEDURE — 74011250636 HC RX REV CODE- 250/636: Performed by: INTERNAL MEDICINE

## 2018-08-06 PROCEDURE — 74011250637 HC RX REV CODE- 250/637: Performed by: ORTHOPAEDIC SURGERY

## 2018-08-06 PROCEDURE — 02HV33Z INSERTION OF INFUSION DEVICE INTO SUPERIOR VENA CAVA, PERCUTANEOUS APPROACH: ICD-10-PCS | Performed by: HOSPITALIST

## 2018-08-06 PROCEDURE — 82550 ASSAY OF CK (CPK): CPT | Performed by: INTERNAL MEDICINE

## 2018-08-06 RX ORDER — CEPHALEXIN 500 MG/1
500 CAPSULE ORAL 4 TIMES DAILY
Qty: 36 CAP | Refills: 0 | Status: SHIPPED | OUTPATIENT
Start: 2018-08-06 | End: 2018-08-16

## 2018-08-06 RX ORDER — SODIUM CHLORIDE 0.9 % (FLUSH) 0.9 %
10 SYRINGE (ML) INJECTION EVERY 24 HOURS
Status: DISCONTINUED | OUTPATIENT
Start: 2018-08-06 | End: 2018-08-06 | Stop reason: HOSPADM

## 2018-08-06 RX ORDER — SODIUM CHLORIDE 0.9 % (FLUSH) 0.9 %
10-30 SYRINGE (ML) INJECTION AS NEEDED
Status: DISCONTINUED | OUTPATIENT
Start: 2018-08-06 | End: 2018-08-06 | Stop reason: HOSPADM

## 2018-08-06 RX ORDER — OXYCODONE AND ACETAMINOPHEN 10; 325 MG/1; MG/1
2 TABLET ORAL
Qty: 32 TAB | Refills: 0 | Status: SHIPPED | OUTPATIENT
Start: 2018-08-06 | End: 2018-11-04

## 2018-08-06 RX ORDER — SODIUM CHLORIDE 0.9 % (FLUSH) 0.9 %
10 SYRINGE (ML) INJECTION AS NEEDED
Status: DISCONTINUED | OUTPATIENT
Start: 2018-08-06 | End: 2018-08-06 | Stop reason: HOSPADM

## 2018-08-06 RX ORDER — SODIUM CHLORIDE 0.9 % (FLUSH) 0.9 %
10-40 SYRINGE (ML) INJECTION EVERY 8 HOURS
Status: DISCONTINUED | OUTPATIENT
Start: 2018-08-06 | End: 2018-08-06 | Stop reason: HOSPADM

## 2018-08-06 RX ADMIN — CEFAZOLIN SODIUM 2 G: 2 SOLUTION INTRAVENOUS at 04:12

## 2018-08-06 RX ADMIN — CEFAZOLIN SODIUM 2 G: 2 SOLUTION INTRAVENOUS at 11:28

## 2018-08-06 RX ADMIN — HYDROCORTISONE VALERATE: 2 CREAM TOPICAL at 11:28

## 2018-08-06 RX ADMIN — OXYCODONE HYDROCHLORIDE AND ACETAMINOPHEN 2 TABLET: 10; 325 TABLET ORAL at 04:12

## 2018-08-06 RX ADMIN — OXYCODONE HYDROCHLORIDE AND ACETAMINOPHEN 2 TABLET: 10; 325 TABLET ORAL at 10:44

## 2018-08-06 RX ADMIN — MORPHINE SULFATE 2 MG: 4 INJECTION INTRAVENOUS at 05:09

## 2018-08-06 NOTE — PROGRESS NOTES
[facility]  Hospitalist Division        Inpatient Daily Progress Note    Daily progress Note    Patient: Kiara Haddad MRN: [id number]  CSN: [id number]    YOB: 1955  Age: 65 y.o. Sex: male    DOA: 7/29/2018 LOS:  LOS: 8 days                    Chief Complaint:  Cellulitis right leg    Interval History: 66 y/o male with hx of Basal Cell Skin CA presented to the ED on 7/27 for wound and redness underneath right knee. Pt stated this happened when he was painting the house, he got down on his knee and a nail punched in. Xray of knee w/o abnormality. In the ED he was prescribed Bactrim and Keflex and discharged. He presented back to the ED on7/29 for wound check- pt noticed edema and redness has progressed above his knee and down to his ankle. Lactic acid normal, blood culture collected x 1 NGTD, wound culture with moderate WBC's, no organisms seen. Pt started on IV Abx and admitted for further management of care. Wound care following. PVL RLE negative. MRI right knee soft tissue puncture wound to anterior pretibial soft tissues at level of tibial tuberosity w/ adjacent findings of cellulitis and ill-defined subcutaneous phlegmon w/in prepatellar soft tissue. Pt spiked temp of 102.4 on 7/30- UA negative, repeat blood cultures x 2 pending and CXR negative. Ortho consulted. S/p  I&D RLE for pre-patellar bursa 7/31 with wound vac placed. Culture with many staph aureus, anaerobic culture negative. Erythema and edema slightly improved on 8/4 - plan is to continue IV abx then switch to oral, monitor in hospital for one day while on oral abx to make sure symptoms do not worsen, then d/c home with wound vac. Assessment/Plan:     Patient Active Problem List   Diagnosis Code    Cellulitis of right leg L03.115    Infected wound T14. 8XXA, L08.9    Tobacco abuse Z72.0       A/P:  Cellulitis right leg  - seen in ED on 7/27- xray normal, d/c with Keflex and Bactrim  - presented back to ED on 7/29 for wound check, now with edema and progression of erythema  - blood culture collected 7/29 x 1 NGTD/  - wound culture with moderate WBC's, no organisms seen  - PVL RLE negative   - MRI right knee soft tissue puncture wound to anterior pretibial soft tissues at level of tibial tuberosity w/ adjacent findings of cellulitis and ill-defined subcutaneous phlegmon w/in prepatellar soft tissue  - ortho and ID following, appreciate input   - s/p I&D RLE for pre-patellar bursa 7/31 with wound vac placed, culture with many staph aureus, anaerobic culture negative  - per ID, Vanc not in therapeutic range so switching to IV Ancef - will need to stay on IV abx an additional 2-3 days  - per ortho- have pt lay prone to facilitate output  - CRP 12, ESR 96  - PT following    Infected Wound  - received Tetanus shot on 7/27  - wound culture with moderate WBC's, no organisms seen  - wound care consult, appreciate input  - continue IV abx per ID    Fever  - temp of 102.4 on 7/30  - CXR negative  - WBC normal  - UA negative, urine culture negative  - repeat blood cultures 7/30 NGTD x 2  - repeat wound culture 7/31 with staph aurues  - ID consulted- appreciate input  - s/p I&D w/ wound vac placed on 7/31: culture with many staph aureus, anaerobic culture negative  - resolved    Tobacco abuse  - declined nicoderm patch  - educated on cessation    Hx of Basal Cell Skin CA  - f/u outpatient    DVT Prophylaxis  - Lovenox daily    Disposition  - continue IV abx for maybe 2 more days then switch to oral - following ID recommendations  - once on oral abx can likely d/c home      [facility]  [address]  Pager:  [phone]  Office:  [phone]        Subjective:     Erythema and edema slightly improved today. No complaints or concerns at this time.     Objective:      Visit Vitals    /74 (BP 1 Location: Left arm, BP Patient Position: At rest)  Pulse 60    Temp 97.4 °F (36.3 °C)    Resp 18    Ht 5' 9\" (1.753 m)    Wt 81.2 kg (179 lb)    SpO2 99%    BMI 26.43 kg/m2           Physical Exam:  General appearance: alert, cooperative, no distress, appears stated age  Head: Normocephalic, without obvious abnormality, atraumatic  Lungs: clear to auscultation bilaterally  Heart: regular rate and rhythm, S1, S2 normal, no murmur, click, rub or gallop  Abdomen: soft, non tender, non distended. Normoactive bowel sounds. Extremities:wound vac to anterior right knee itntact with minimal sanguineous drainage, erythema noted to RLE (improving), 1+ edema RLE extending up to right thigh, DP palpable bilaterally  Skin: see above  Neurologic: Grossly normal  PSY: Mood and affect normal, appropriately behaved        Intake and Output:  Current Shift:     Last three shifts:  08/04 1901 - 08/06 0700  In: 3800 [P.O.:3600; I.V.:200]  Out: 0     No results found for this or any previous visit (from the past 24 hour(s)). Lab Results   Component Value Date/Time    Glucose 95 08/02/2018 04:00 AM    Glucose 118 (H) 07/31/2018 05:42 AM    Glucose 112 (H) 07/30/2018 05:05 AM    Glucose 114 (H) 07/29/2018 08:02 PM    Glucose 97 07/29/2018 01:50 PM        Imaging:  No results found.     Medication List Reviewed:  Current Facility-Administered Medications   Medication Dose Route Frequency    hydrocortisone valerate (WESTCORT) 0.2 % cream   Topical BID    ceFAZolin (ANCEF) 2g IVPB in 50 mL D5W  2 g IntraVENous Q8H    lidocaine (XYLOCAINE) 4 % (40 mg/mL) topical solution   Topical Q TU, TH & SAT    morphine injection 2 mg  2 mg IntraVENous Q3H PRN    oxyCODONE-acetaminophen (PERCOCET 10)  mg per tablet 2 Tab  2 Tab Oral Q6H PRN    sodium chloride (NS) flush 5-10 mL  5-10 mL IntraVENous Q8H    sodium chloride (NS) flush 5-10 mL  5-10 mL IntraVENous PRN    enoxaparin (LOVENOX) injection 40 mg  40 mg SubCUTAneous Q24H

## 2018-08-06 NOTE — PROGRESS NOTES
Bedside report given by Nelda Rodríguez RN. Pt in bed with HOB elevated, bed locked at lowest position, call bell in reach. Pt is A&OX4, and has purposeful movement in all extremities. Pt IV to LFA is c/d/i, no swelling, erythema, or infiltration noted at this time. Wound vac to R knee is seal is intact with scant drainage in canister. Pt shows no signs of distress at this time. Will continue to monitor.

## 2018-08-06 NOTE — DISCHARGE SUMMARY
[facility]  Hospitalist Division    Discharge Summary    Patient: June Gomez MRN: [id number]  [facility]: [id number]    YOB: 1955  Age: 65 y.o. Sex: male    DOA: 7/29/2018 LOS:  LOS: 8 days   Discharge Date:      Admission Diagnoses: Cellulitis of right leg  pre patellar bursa    Discharge Diagnoses:    Problem List as of 8/6/2018  Date Reviewed: 7/29/2018          Codes Class Noted - Resolved    * (Principal)Cellulitis of right leg ICD-10-CM: L03.115  ICD-9-CM: 682.6  7/29/2018 - Present        Infected wound ICD-10-CM: T14. 8XXA, L08.9  ICD-9-CM: 958.3  7/29/2018 - Present        Tobacco abuse ICD-10-CM: Z72.0  ICD-9-CM: 305.1  7/29/2018 - Present              Discharge Condition: Good    Discharge To: Home    Consults: Infectious Disease and Orthopedics    Hospital Course:     June Gomez is a 64 yo male with h/o skin cancer-basal cell CA several years ago, came to the ED on 7/27/2018 for a wound located underneath his right knee associated with erythema of leg. Patient states that this happened when he was painting his house and got down on his knees and a nail punctured his knee. In ER, he was prescribed bactrim and keflex then discharged. On admission, he noticed increased erythema that progressed above knee and down to his ankle. The leg was swollen, but the knee was not tender. The wound appeared infected. XRay completed of knee on 7/27/18 w/o abnormality. He was admitted for cellulitis with infected wound. Lactic acid normal, blood culture collected x1 NGTD, wound culture with moderate WBCs. He was started on IV antibiotics. PVL RLE negative. MRI right knee significant for soft tissue puncture wound to anterior pretibial soft tissues at level of tibial tuberosity with adjacent findings of cellulitis and ill-defined subcutaneous phlegmon w/in prepatellar soft tissue.  Patient spiked temp of 102.4 on 7/30-UA negative, repeat blood cultures X 2 NGTD; CXR negative. Ortho consulted. S/p I&D RLE for pre-patellar bursa 7/31 with wound vac placed. Culture with many staph aureus, anaerobic culture negative. Infectious disease consulted. Erythema and edema slightly improved on 8/4. The patient will be discharged home today on IV ancef 2g q8 hours through August 14, 2018 with labs weekly cbc/diff and bmp; PICC line placed prior to discharge. Home health for wound vac/IVAB administration/PICC care at home. The patient will be discharged home today with home health. Inpatient treatment:     Cellulitis right leg  - seen in ED on 7/27- xray normal, d/c with Keflex and Bactrim  - presented back to ED on 7/29 for wound check, now with edema and progression of erythema  - blood culture collected 7/29 x 1 NGTD/  - wound culture with moderate WBC's, no organisms seen  - PVL RLE negative   - MRI right knee soft tissue puncture wound to anterior pretibial soft tissues at level of tibial tuberosity w/ adjacent findings of cellulitis and ill-defined subcutaneous phlegmon w/in prepatellar soft tissue  - ortho and ID following, appreciate input   - s/p I&D RLE for pre-patellar bursa 7/31 with wound vac placed, culture with many staph aureus, anaerobic culture negative  - per ID, Vanc not in therapeutic range so switching to IV Ancef.     - per ortho- have pt lay prone to facilitate output  - CRP 12, ESR 96  - PT following  -IV ancef on discharge      Infected Wound  - received Tetanus shot on 7/27  - wound culture with moderate WBC's, no organisms seen  - wound care consult, appreciate input  - continue IV abx per ID     Fever  - temp of 102.4 on 7/30  - CXR negative  - WBC normal  - UA negative, urine culture negative  - repeat blood cultures 7/30 NGTD x 2  - repeat wound culture 7/31 with staph aurues  - ID consulted- appreciate input  - s/p I&D w/ wound vac placed on 7/31: culture with many staph aureus, anaerobic culture negative  - resolved     Tobacco abuse  - declined nicoderm patch  - educated on cessation     Hx of Basal Cell Skin CA  - f/u outpatient     DVT Prophylaxis  - Lovenox daily     Physical Exam:  General appearance: alert, cooperative, no distress  Lungs: clear to auscultation bilaterally  Heart: regular rate and rhythm, S1, S2 normal  Abdomen: soft, non-tender. Bowel sounds normal.  Extremities: decreased erythema, minimal tenderness   Neurologic: Grossly normal  PSY: Mood and affect normal, appropriately behaved    Significant Diagnostic Studies:      CHEST AP PORTABLE     Indication: Fever.     Comparison: None.      Findings: The lungs appear clear of infiltrates. Minimal discoid opacity at the  left costophrenic angle, likely subsegmental atelectasis or scarring. No  evidence for pneumothorax or pleural effusion. Cardiac silhouette and pulmonary  vascularity appear within normal limits.     IMPRESSION  IMPRESSION:     No acute cardiopulmonary disease. Minimal left basilar subsegmental atelectasis  or scarring.           Examination: MRI right knee without and with contrast.     HISTORY: 65year-old patient with progressive erythema, soft tissue swelling,  and pain about the right knee joint following puncture wound below the level of  the knee. Erythema has spread above and below the knee joint while being on  antibiotics. Evaluation for potential osteomyelitis or organized/drainable fluid  collection is requested.     TECHNIQUE: An MRI examination of the right knee is performed utilizing a local  coil.  Coronal and sagittal STIR and T1 images as well as axial T1 and T2  fat-suppressed images were obtained before the uneventful intravenous  administration of 15 mL Dotarem intravenous gadolinium contrast. Postcontrast  imaging was performed utilizing T1 fat-suppressed techniques and axial, coronal,  and sagittal planes.     COMPARISON: Radiographs of the right knee dated July 27, 2018.     FINDINGS:     In keeping with the provided history, there is a subcutaneous puncture wound at  the level of the tibial tuberosity with considerable associated adjacent skin  thickening and enhancement. Contiguous with puncture wound, there is a  ill-defined fluid collection within the subcutaneous tissues of the anterior leg  which measures approximately 10 cm in craniocaudal span, approximately 5 mm in  anterior-posterior span, and approximately 5 cm in medial-lateral span. There is  focal area of dephasing artifact present within the proximal medial right leg  soft tissues, below the level of the knee joint, in keeping with punctate  radiodensity noted on preceding radiographs.     There is mild intrinsic muscular edema and enhancement noted within the anterior  superior portion of the tibialis anterior muscle belly. No evidence of organized  or drainable intramuscular fluid collection.      Evaluation of the bone marrow signal demonstrates no evidence of T1 marrow  hypointensity to suggest osteomyelitis. Pertinently, no evidence of joint  effusion or synovitis is present. Physiologic amount of fluid present within the  knee joint. No fracture, stress fracture, or stress reaction.     The extensor mechanism is intact. Quadriceps and patellar tendons are normal in  appearance.     Although not performed with a protocol for evaluation of internal derangement,  the anterior and posterior cruciate ligaments appear grossly intact. Bilateral  collateral ligaments are similarly intact. No evidence of Baker's cyst  formation.     Neurovascular bundles normal in appearance.     IMPRESSION:     1. Soft tissue puncture wound to the anterior pretibial soft tissues at the  level of the tibial tuberosity with adjacent findings of cellulitis and  ill-defined subcutaneous phlegmon within the prepatellar soft tissues.     2. Mild reactive myositis involving the proximal anterior fibers of the tibialis  anterior muscle belly. No rim-enhancing intramuscular fluid collection.     3.  No evidence of significant joint effusion or synovitis. Physiologic amount of  fluid within the knee joint.     4. No evidence of bone marrow signal abnormality to suggest osteomyelitis. PVL  · No evidence of deep vein thrombosis in the right lower extremity.     Discharge Medications:     Current Discharge Medication List      START taking these medications    Details   oxyCODONE-acetaminophen (PERCOCET 10)  mg per tablet Take 2 Tabs by mouth every six (6) hours as needed. Max Daily Amount: 8 Tabs. Qty: 32 Tab, Refills: 0    Associated Diagnoses: Cellulitis of right leg         CONTINUE these medications which have CHANGED    Details   IV ancef 2g q 8 hours  Continue through August 14, 2018          STOP taking these medications       trimethoprim-sulfamethoxazole (BACTRIM DS, SEPTRA DS) 160-800 mg per tablet Comments:   Reason for Stopping: regimen completed               Activity: Activity as tolerated; per PT recommendations -WBAT. Home health orders: Routine PICC care; Ancef 2 g IV q8h through august 14, 2018; labs weekly CBC/diff, BMP    Diet: Regular Diet    Wound Care: As directed; wound vac changes M/W/F     Follow-up:     The patient should follow-up with PCP and orthopedic surgery within 1 week in regards to recent hospitalization. Follow-up with Infectious Disease office [phone] prior to dc IV antibiotics. Patient to arrange.      Discharge time > 35 mins   Mahendra Molina NP  8/6/2018, 9:13 AM

## 2018-08-06 NOTE — PROCEDURES
PICC line insertion RUE using 3CG technology. Tip confirmed SVC/CAJ and ready to use. Reported to RN caring for patient. Lidocaine 1% plain 4 ml used as local for insertion.

## 2018-08-06 NOTE — PROGRESS NOTES
Wd vac delivered to pt's room. Instructed pt's nurse to switch to home vac. Instructed pt hh, from [facility] will be out  to see him. Pt signed wd vac delivery form, copy given to him, copy to chart. Called [facility] , spoke with isadora muir, notified of dc today. Has script for keflex, will [facility] to [facility]. Also has script for percocet, cm will not assist with pain meds. pt will call for a ride home, if not able to get one we can do lyft ride. He stated he would take the bus, he will not be able to adhere to wt bearing status with vac attached on a bus.  Told him we will call for ride if he is unable to get one.plan home with hh.

## 2018-08-06 NOTE — PROGRESS NOTES
Problem: Falls - Risk of  Goal: *Absence of Falls  Document Yoko Fall Risk and appropriate interventions in the flowsheet. Outcome: Progressing Towards Goal  Fall Risk Interventions:  Mobility Interventions: Patient to call before getting OOB         Medication Interventions: Evaluate medications/consider consulting pharmacy, Patient to call before getting OOB, Teach patient to arise slowly    Elimination Interventions: Call light in reach, Patient to call for help with toileting needs, Toileting schedule/hourly rounds    History of Falls Interventions: Consult care management for discharge planning, Evaluate medications/consider consulting pharmacy, Room close to nurse's station        Problem: Pressure Injury - Risk of  Goal: *Prevention of pressure injury  Document Erick Scale and appropriate interventions in the flowsheet. Outcome: Progressing Towards Goal  Pressure Injury Interventions:             Activity Interventions: Increase time out of bed, Pressure redistribution bed/mattress(bed type)    Mobility Interventions: HOB 30 degrees or less, Pressure redistribution bed/mattress (bed type)    Nutrition Interventions: Document food/fluid/supplement intake, Offer support with meals,snacks and hydration    Friction and Shear Interventions: HOB 30 degrees or less, Lift sheet

## 2018-08-06 NOTE — PROGRESS NOTES
Progress Note  Assessment:    1. Status post I&D right prepatellar burase      PLAN:    1. [id number] [address] for d/c home today with wound vac changes M/W/F. F/u in the office with Dr Ariella Faye in 1 week           HPI: Kobe Pedro is a 65 y.o. male patient without new complaints status post I&D for Cellulitis of right leg  pre patellar bursa 7/29/2018. No new orthopaedic changes. Blood pressure 110/74, pulse 60, temperature 97.4 °F (36.3 °C), resp. rate 18, height 5' 9\" (1.753 m), weight 81.2 kg (179 lb), SpO2 99 %. CBC w/Diff   Lab Results   Component Value Date/Time    WBC 7.1 08/02/2018 04:00 AM    RBC 3.62 (L) 08/02/2018 04:00 AM    HCT 33.6 (L) 08/02/2018 04:00 AM          Physical Assessment:  General: in no apparent distress   Extremities:  Neurovascular intact    Dressing:  Wound vac in place   DVT Exam:   No exam evidence to suggest DVT.  Compartments soft and NT.               Sreekanth Sterling PA-C  8/6/2018  Office [phone] Pnhi [phone]

## 2018-08-06 NOTE — PROGRESS NOTES
Problem: Falls - Risk of  Goal: *Absence of Falls  Document Yoko Fall Risk and appropriate interventions in the flowsheet. Outcome: Progressing Towards Goal  Fall Risk Interventions:  Mobility Interventions: Patient to call before getting OOB         Medication Interventions: Evaluate medications/consider consulting pharmacy    Elimination Interventions: Call light in reach    History of Falls Interventions: Consult care management for discharge planning        Problem: Pressure Injury - Risk of  Goal: *Prevention of pressure injury  Document Erick Scale and appropriate interventions in the flowsheet. Pressure Injury Interventions:             Activity Interventions: Increase time out of bed, Pressure redistribution bed/mattress(bed type)    Mobility Interventions: HOB 30 degrees or less, Pressure redistribution bed/mattress (bed type)    Nutrition Interventions: Document food/fluid/supplement intake    Friction and Shear Interventions: HOB 30 degrees or less

## 2018-08-06 NOTE — PROGRESS NOTES
INFECTIOUS DISEASE FOLLOW UP NOTE :-        Admit Date: 7/29/2018    ABX;      Current  Prior    daptomycin 8/6- 8/14 at outpt infusion center  Ancef 8/2 - 8/6;Levaquin 7.29-2, Vanco 7/29-4, Meropenem 7/31-2     ASSESSMENT: -> RECS     Right leg wound with cellulitis MSSA with prepatellar purulent bursitis- started with puncture nail wound over a week ago  - suspect nail was old and pascual and infected  - pt s/p Tetanus shot in ED 7/27  - wound cx - MSSA, Blcx ntd  - MRI knee neg for septic arthritis, PVLs neg  - s/p I and D of prepatellar bursa and fascia 7/31 by Dr Jackie Medellin   - ESR/CRP 96/12    Improved but significant residual erythema, swelling  - final OR cx with MSSA   - complete rx with iv daptomycin- as can receive in outpt infusion center and discont Iv Ancef [no improvement on  keflex as outpt ]  Stat picc and infusion orders entered  -Home health orders:  Routine PICC care  daptomycin 10 mg/kg iv q 24 h through aug 14, 2018  Labs weekly: cbc/diff, cpk, esr, CRP   cpk today  Fu  With ID in office [id number] prior to dc iv antibiotics on 8/14    - will need minimum 14 days of abx from surgery   - d/w Medicine team- THIAGO Magallon and dc planner- Scooby Prather- appreciate the assist   New fever 7/30- suspect from un drained abscess  - blcx neg, leukocytosis resolved  - fever resolved  - fever resolved    H/o basal cell ca     Smoker - Counseled on cessation   Ax- PCN- >30 years ago - tolerated keflex as out pt, tolerated meropenem     MICROBIOLOGY:   7/29               Wd cx - MSSA                         Blcx x1 neg  7/30               Blcx x2 ntd                         Ucx neg  7/31               Wound cx - MSSA                        Anaerobic cx ntd                         Tissue cx - MSSA     LINES AND CATHETERS:   PIV    SUBJECTIVE :     Interval notes reviewed.  Pt is feeling slightly better, says that his leg is still swollen but redness over leg  better. Improved rom. No fever/chills. MEDICATIONS :     Current Facility-Administered Medications   Medication Dose Route Frequency    hydrocortisone valerate (WESTCORT) 0.2 % cream   Topical BID    ceFAZolin (ANCEF) 2g IVPB in 50 mL D5W  2 g IntraVENous Q8H    lidocaine (XYLOCAINE) 4 % (40 mg/mL) topical solution   Topical Q TU, TH & SAT    morphine injection 2 mg  2 mg IntraVENous Q3H PRN    oxyCODONE-acetaminophen (PERCOCET 10)  mg per tablet 2 Tab  2 Tab Oral Q6H PRN    sodium chloride (NS) flush 5-10 mL  5-10 mL IntraVENous Q8H    sodium chloride (NS) flush 5-10 mL  5-10 mL IntraVENous PRN    enoxaparin (LOVENOX) injection 40 mg  40 mg SubCUTAneous Q24H       OBJECTIVE :     Visit Vitals    /74 (BP 1 Location: Left arm, BP Patient Position: At rest)    Pulse 60    Temp 97.4 °F (36.3 °C)    Resp 18    Ht 5' 9\" (1.753 m)    Wt 81.2 kg (179 lb)    SpO2 99%    BMI 26.43 kg/m2       Temp (24hrs), Av.7 °F (36.5 °C), Min:97.4 °F (36.3 °C), Max:97.9 °F (36.6 °C)    General: Well-developed, 65y.o. year-old, male, in no acute distress  HEENT: Normocephalic, anicteric sclerae, Pupils equal, round reactive to light, no oropharyngeal lesions. Chest: Symmetrical expansion  Lungs: Clear to auscultation bilaterally, no dullness  Heart: Regular rhythm, no murmurs, rubs or gallops, No JVD  Abdomen: Soft, non-tender,non distended, no organomegaly, BS+  Musculoskeletal: Rt knee area with wound vac present, surrounding edema and redness caudad and  lateral side , also redness and swelling of leg till proximal shin  PIV intact    Labs: Results:   Chemistry No results for input(s): GLU, NA, K, CL, CO2, BUN, CREA, CA, AGAP, BUCR, TBIL, GPT, AP, TP, ALB, GLOB, AGRAT in the last 72 hours. CBC w/Diff No results for input(s): WBC, RBC, HGB, HCT, PLT, GRANS, LYMPH, EOS, HGBEXT, HCTEXT, PLTEXT, HGBEXT, HCTEXT, PLTEXT in the last 72 hours.        RADIOLOGY :      MRI KNEE 7/30 - IMPRESSION:     1. Soft tissue puncture wound to the anterior pretibial soft tissues at the  level of the tibial tuberosity with adjacent findings of cellulitis and  ill-defined subcutaneous phlegmon within the prepatellar soft tissues.     2. Mild reactive myositis involving the proximal anterior fibers of the tibialis  anterior muscle belly. No rim-enhancing intramuscular fluid collection.     3. No evidence of significant joint effusion or synovitis. Physiologic amount of  fluid within the knee joint.     4. No evidence of bone marrow signal abnormality to suggest osteomyelitis.     All Micro Results     Procedure Component Value Units Date/Time    CULTURE, BLOOD [[id number]] Collected:  07/30/18 1835    Order Status:  Completed Specimen:  Whole Blood from Blood Updated:  08/05/18 0814     Special Requests: NO SPECIAL REQUESTS        Culture result: NO GROWTH 6 DAYS       CULTURE, BLOOD [[id number]] Collected:  07/30/18 1830    Order Status:  Completed Specimen:  Whole Blood from Blood Updated:  08/05/18 0814     Special Requests: NO SPECIAL REQUESTS        Culture result: NO GROWTH 6 DAYS       CULTURE, ANAEROBIC [[id number]] Collected:  07/31/18 1709    Order Status:  Completed Specimen:  Right Updated:  08/05/18 0644     Special Requests: NO SPECIAL REQUESTS        Culture result:         NO ANAEROBES ISOLATED 5 DAYS    CULTURE, ANAEROBIC [[id number]] Collected:  07/31/18 1230    Order Status:  Completed Specimen:  Wound Drainage Updated:  08/05/18 0640     Special Requests: NO SPECIAL REQUESTS        Culture result:         NO ANAEROBES ISOLATED 5 DAYS    CULTURE, BLOOD [[id number]] Collected:  07/29/18 2002    Order Status:  Completed Specimen:  Blood from Blood Updated:  08/04/18 0724     Special Requests: NO SPECIAL REQUESTS        Culture result: NO GROWTH 6 DAYS       CULTURE, TISSUE Jaki Reasons STAIN [[id number]]  (Abnormal)  (Susceptibility) Collected:  07/31/18 1709    Order Status:  Completed Specimen:  Right Updated:  08/02/18 1516     Special Requests: NO SPECIAL REQUESTS        GRAM STAIN MANY WBC'S                 MANY GRAM POSITIVE COCCI IN PAIRS              MANY GRAM POSITIVE COCCI IN GROUPS     Culture result:         MANY STAPHYLOCOCCUS AUREUS (A)    CULTURE, WOUND Minnette Hoe STAIN [[id number]]  (Abnormal)  (Susceptibility) Collected:  07/31/18 0830    Order Status:  Completed Specimen:  Leg Updated:  08/02/18 0831     Special Requests: NO SPECIAL REQUESTS        GRAM STAIN MANY WBC'S                 FEW GRAM POSITIVE COCCI IN PAIRS     Culture result:         MANY STAPHYLOCOCCUS AUREUS (A)    CULTURE, URINE [[id number]] Collected:  07/30/18 2030    Order Status:  Completed Specimen:  Urine from Cath Urine Updated:  08/01/18 1024     Special Requests: NO SPECIAL REQUESTS        Culture result: NO GROWTH 2 DAYS       CULTURE, WOUND Minnette Hoe STAIN [[id number]]  (Abnormal)  (Susceptibility) Collected:  07/29/18 1830    Order Status:  Completed Specimen:  Wound from Knee  Updated:  08/01/18 0826     Special Requests: NO SPECIAL REQUESTS        GRAM STAIN MODERATE WBC'S         NO ORGANISMS SEEN        Culture result:         FEW STAPHYLOCOCCUS AUREUS (A)    CULTURE, BLOOD, PAIRED [[id number]] Collected:  07/29/18 1830    Order Status:  Canceled Specimen:  Blood           Ignacia Frost MD  August 6, 2018  [facility]  [phone]

## 2018-08-06 NOTE — PROGRESS NOTES
Saw order from dr Billie Dunn for iv antibxs. called her, pt is uninsured and needs daily dose at op infusion center. She placed new orders for dapto daily. Order, H&P, face sheet fax'd to sanjuanita at [facility] op infusion center. Pt scheduled for 3pm tomorrow, tues 8/7/18. Notified pt  Of appoint , gave written instruction as well. [facility] [place] [facility] He states he has no way to get there. Notified dr Billie Dunn, likely pt will not be going on wkend. Encouraged him to do so. foc completed for [facility]  for wd vac. Copy on chart. Referral in [facility]. Gave [facility] [place] nurse, ander  From pts rm that we bought earlier, she is to destroy. planhome with  and op infusion center. Pt will need lyft ride home. Care Management Interventions  PCP Verified by CM: Yes  Palliative Care Criteria Met (RRAT>21 & CHF Dx)?: No  Mode of Transport at Discharge: Other (see comment)  Transition of Care Consult (CM Consult): [address]: Yes  Discharge Durable Medical Equipment: No  Physical Therapy Consult: No  Occupational Therapy Consult: No  Speech Therapy Consult: No  Current Support Network:  Other  Confirm Follow Up Transport: Cab  Plan discussed with Pt/Family/Caregiver: Yes  Freedom of Choice Offered: Yes  Discharge Location  Discharge Placement: Home with home health [facility]

## 2018-08-07 ENCOUNTER — HOME CARE VISIT (OUTPATIENT)
Dept: HOME HEALTH SERVICES | Facility: HOME HEALTH | Age: 63
End: 2018-08-07

## 2018-08-07 ENCOUNTER — HOSPITAL ENCOUNTER (OUTPATIENT)
Dept: INFUSION THERAPY | Age: 63
Discharge: HOME | End: 2018-08-07
Payer: SELF-PAY

## 2018-08-07 ENCOUNTER — HOME CARE VISIT (OUTPATIENT)
Dept: SCHEDULING | Facility: HOME HEALTH | Age: 63
End: 2018-08-07
Payer: SELF-PAY

## 2018-08-07 VITALS
DIASTOLIC BLOOD PRESSURE: 79 MMHG | TEMPERATURE: 97.5 F | SYSTOLIC BLOOD PRESSURE: 111 MMHG | RESPIRATION RATE: 18 BRPM | HEART RATE: 79 BPM | OXYGEN SATURATION: 97 %

## 2018-08-07 VITALS
DIASTOLIC BLOOD PRESSURE: 70 MMHG | OXYGEN SATURATION: 96 % | HEART RATE: 61 BPM | TEMPERATURE: 98.7 F | SYSTOLIC BLOOD PRESSURE: 120 MMHG | RESPIRATION RATE: 18 BRPM

## 2018-08-07 PROCEDURE — 74011000250 HC RX REV CODE- 250: Performed by: INTERNAL MEDICINE

## 2018-08-07 PROCEDURE — 96374 THER/PROPH/DIAG INJ IV PUSH: CPT

## 2018-08-07 PROCEDURE — A4927 NON-STERILE GLOVES: HCPCS

## 2018-08-07 PROCEDURE — A6216 NON-STERILE GAUZE<=16 SQ IN: HCPCS

## 2018-08-07 PROCEDURE — 400013 HH SOC

## 2018-08-07 PROCEDURE — 74011250636 HC RX REV CODE- 250/636: Performed by: INTERNAL MEDICINE

## 2018-08-07 PROCEDURE — A4452 WATERPROOF TAPE: HCPCS

## 2018-08-07 PROCEDURE — A4649 SURGICAL SUPPLIES: HCPCS

## 2018-08-07 PROCEDURE — G0299 HHS/HOSPICE OF RN EA 15 MIN: HCPCS

## 2018-08-07 PROCEDURE — 74011250636 HC RX REV CODE- 250/636

## 2018-08-07 RX ORDER — SODIUM CHLORIDE 0.9 % (FLUSH) 0.9 %
10-40 SYRINGE (ML) INJECTION AS NEEDED
Status: DISCONTINUED | OUTPATIENT
Start: 2018-08-07 | End: 2018-08-11 | Stop reason: HOSPADM

## 2018-08-07 RX ORDER — HEPARIN 100 UNIT/ML
500 SYRINGE INTRAVENOUS AS NEEDED
Status: DISPENSED | OUTPATIENT
Start: 2018-08-07 | End: 2018-08-08

## 2018-08-07 RX ADMIN — Medication 10 ML: at 15:52

## 2018-08-07 RX ADMIN — Medication 10 ML: at 15:49

## 2018-08-07 RX ADMIN — DAPTOMYCIN 750 MG: 500 INJECTION, POWDER, LYOPHILIZED, FOR SOLUTION INTRAVENOUS at 15:49

## 2018-08-07 RX ADMIN — SODIUM CHLORIDE, PRESERVATIVE FREE 500 UNITS: 5 INJECTION INTRAVENOUS at 15:52

## 2018-08-07 NOTE — PROGRESS NOTES
[facility] Progress Note    Date: 2018    Name: Artem Chan    MRN: [id number]         : 1955    [facility]  Infusion    Mr. Naveen Back to [facility], ambulatory at [phone] accompanied by his elderly friend. Pt was assessed and education was provided. Mr. Rl Blair vitals were reviewed. Visit Vitals    /79 (BP 1 Location: Left arm, BP Patient Position: At rest;Sitting)    Pulse 79    Temp 97.5 °F (36.4 °C)    Resp 18    SpO2 97%       Right  upper arm PICC flushed easily and had brisk blood return via both ports. PICC dressing c/d/i and not due to be changed. No swelling, redness, streaking, warmth or drainage noted in arm. Pt denied c/o pain in arm.      []  Vancomycin     []  Invanz     [x]  Cubicin 750 mg      []  Rocephin    was IVP over 2 mins as ordered. PICC dressing and stat lock changed under sterile technique after cleansing site with chlorhexidine. Biopatch and skin protectant applied, and microclaves changed. No redness, streaking, warmth, or drainage noted at site. Mr. Naveen Back tolerated infusion, and had no complaints at this time. Single  lumens of PICC flushed with NS 10 ml and Heparin 250 units. Green end caps applied, and lumens wrapped with guaze and paper tape. Stockinette placed over dressing for protection. Patient armband removed and shredded. Mr. Naveen Back was discharged from [place] [address] in stable condition at [phone]. He is to return on 2018 at 1300 for his next antibiotic appointment.     Cyndi Ayala RN  2018

## 2018-08-08 ENCOUNTER — HOME CARE VISIT (OUTPATIENT)
Dept: HOME HEALTH SERVICES | Facility: HOME HEALTH | Age: 63
End: 2018-08-08
Payer: SELF-PAY

## 2018-08-08 ENCOUNTER — HOME CARE VISIT (OUTPATIENT)
Dept: SCHEDULING | Facility: HOME HEALTH | Age: 63
End: 2018-08-08
Payer: SELF-PAY

## 2018-08-08 ENCOUNTER — HOSPITAL ENCOUNTER (OUTPATIENT)
Dept: INFUSION THERAPY | Age: 63
Discharge: HOME | End: 2018-08-08
Payer: SELF-PAY

## 2018-08-08 VITALS
DIASTOLIC BLOOD PRESSURE: 77 MMHG | TEMPERATURE: 97.8 F | SYSTOLIC BLOOD PRESSURE: 124 MMHG | RESPIRATION RATE: 18 BRPM | OXYGEN SATURATION: 97 % | HEART RATE: 84 BPM

## 2018-08-08 PROCEDURE — 74011000250 HC RX REV CODE- 250: Performed by: INTERNAL MEDICINE

## 2018-08-08 PROCEDURE — 74011250636 HC RX REV CODE- 250/636: Performed by: INTERNAL MEDICINE

## 2018-08-08 PROCEDURE — 96374 THER/PROPH/DIAG INJ IV PUSH: CPT

## 2018-08-08 PROCEDURE — G0299 HHS/HOSPICE OF RN EA 15 MIN: HCPCS

## 2018-08-08 RX ORDER — HEPARIN 100 UNIT/ML
500 SYRINGE INTRAVENOUS ONCE
Status: COMPLETED | OUTPATIENT
Start: 2018-08-08 | End: 2018-08-08

## 2018-08-08 RX ORDER — SODIUM CHLORIDE 0.9 % (FLUSH) 0.9 %
5-10 SYRINGE (ML) INJECTION AS NEEDED
Status: DISCONTINUED | OUTPATIENT
Start: 2018-08-08 | End: 2018-08-12 | Stop reason: HOSPADM

## 2018-08-08 RX ORDER — HEPARIN SODIUM (PORCINE) LOCK FLUSH IV SOLN 100 UNIT/ML 100 UNIT/ML
500 SOLUTION INTRAVENOUS AS NEEDED
Status: DISPENSED | OUTPATIENT
Start: 2018-08-08 | End: 2018-08-09

## 2018-08-08 RX ADMIN — Medication 10 ML: at 13:20

## 2018-08-08 RX ADMIN — DAPTOMYCIN 750 MG: 500 INJECTION, POWDER, LYOPHILIZED, FOR SOLUTION INTRAVENOUS at 13:21

## 2018-08-08 RX ADMIN — Medication 10 ML: at 13:26

## 2018-08-08 RX ADMIN — SODIUM CHLORIDE, PRESERVATIVE FREE 500 UNITS: 5 INJECTION INTRAVENOUS at 13:27

## 2018-08-08 NOTE — PROGRESS NOTES
[facility] Progress Note    Date: 2018    Name: Shaun Zambrano    MRN: [id number]         : 1955    [facility]  Infusion    Mr. Katarina Centeno to [facility], ambulatory at  accompanied by his elderly friend. Pt was assessed and education was provided. Mr. Bridger Frank vitals were reviewed. Visit Vitals    /77 (BP 1 Location: Left arm, BP Patient Position: At rest;Sitting)    Pulse 84    Temp 97.8 °F (36.6 °C)    Resp 18    SpO2 97%       Right  upper arm PICC flushed easily and had brisk blood return via both ports. PICC dressing c/d/i and not due to be changed. No swelling, redness, streaking, warmth or drainage noted in arm. Pt denied c/o pain in arm.      []  Vancomycin     []  Invanz     [x]  Cubicin 750 mg      []  Rocephin    was IVP over 2 mins as ordered. PICC dressing and stat lock changed under sterile technique after cleansing site with chlorhexidine. Biopatch and skin protectant applied, and microclaves changed. No redness, streaking, warmth, or drainage noted at site. Mr. Katarina Centeno tolerated infusion, and had no complaints at this time. Single  lumens of PICC flushed with NS 10 ml and Heparin 250 units. Green end caps applied, and lumens wrapped with guaze and paper tape. Stockinette placed over dressing for protection. Patient armband removed and shredded. Mr. Katarina Centeno was discharged from [place] [address] in stable condition at 1330. He is to return on 2018 at 1300 for his next antibiotic appointment.     Anamaria Fontana RN  2018

## 2018-08-09 ENCOUNTER — HOSPITAL ENCOUNTER (OUTPATIENT)
Dept: INFUSION THERAPY | Age: 63
Discharge: HOME | End: 2018-08-09
Payer: SELF-PAY

## 2018-08-09 VITALS
TEMPERATURE: 97.9 F | DIASTOLIC BLOOD PRESSURE: 77 MMHG | RESPIRATION RATE: 18 BRPM | SYSTOLIC BLOOD PRESSURE: 122 MMHG | OXYGEN SATURATION: 94 % | HEART RATE: 110 BPM

## 2018-08-09 VITALS
OXYGEN SATURATION: 95 % | HEART RATE: 78 BPM | DIASTOLIC BLOOD PRESSURE: 70 MMHG | SYSTOLIC BLOOD PRESSURE: 122 MMHG | TEMPERATURE: 99.2 F | RESPIRATION RATE: 14 BRPM

## 2018-08-09 PROCEDURE — 74011000250 HC RX REV CODE- 250: Performed by: INTERNAL MEDICINE

## 2018-08-09 PROCEDURE — 96374 THER/PROPH/DIAG INJ IV PUSH: CPT

## 2018-08-09 PROCEDURE — 74011250636 HC RX REV CODE- 250/636: Performed by: INTERNAL MEDICINE

## 2018-08-09 RX ORDER — HEPARIN 100 UNIT/ML
500 SYRINGE INTRAVENOUS ONCE
Status: COMPLETED | OUTPATIENT
Start: 2018-08-09 | End: 2018-08-09

## 2018-08-09 RX ORDER — SODIUM CHLORIDE 0.9 % (FLUSH) 0.9 %
10-40 SYRINGE (ML) INJECTION AS NEEDED
Status: DISCONTINUED | OUTPATIENT
Start: 2018-08-09 | End: 2018-08-13 | Stop reason: HOSPADM

## 2018-08-09 RX ADMIN — Medication 10 ML: at 15:06

## 2018-08-09 RX ADMIN — Medication 10 ML: at 15:08

## 2018-08-09 RX ADMIN — DAPTOMYCIN 750 MG: 500 INJECTION, POWDER, LYOPHILIZED, FOR SOLUTION INTRAVENOUS at 15:06

## 2018-08-09 RX ADMIN — SODIUM CHLORIDE, PRESERVATIVE FREE 500 UNITS: 5 INJECTION INTRAVENOUS at 15:09

## 2018-08-09 NOTE — PROGRESS NOTES
[facility] Progress Note    Date: 2018    Name: Les Alarcon    MRN: [id number]         : 1955    [facility]  Infusion    Mr. Ron Vitale to [facility], ambulatory at 1500 accompanied by his elderly friend. Pt was assessed and education was provided. Mr. Lee Ann Luis vitals were reviewed. Visit Vitals    /77 (BP 1 Location: Left arm, BP Patient Position: At rest;Sitting)    Pulse (!) 110    Temp 97.9 °F (36.6 °C)    Resp 18    SpO2 94%       Right  upper arm PICC flushed easily and had brisk blood return via both ports. PICC dressing c/d/i and not due to be changed. No swelling, redness, streaking, warmth or drainage noted in arm. Pt denied c/o pain in arm. Patient due to have weekly labs as well as PICC care done tomorrow. []  Vancomycin     []  Invanz     [x]  Cubicin 750 mg      []  Rocephin    was IVP over 2 mins as ordered. PICC dressing and stat lock changed under sterile technique after cleansing site with chlorhexidine. Biopatch and skin protectant applied, and microclaves changed. No redness, streaking, warmth, or drainage noted at site. Mr. Ron Vitale tolerated infusion, and had no complaints at this time. Single  lumens of PICC flushed with NS 10 ml and Heparin 250 units. Green end caps applied, and lumens wrapped with guaze and paper tape. Stockinette placed over dressing for protection. Patient armband removed and shredded. Mr. Ron Vitale was discharged from [place] [address] in stable condition at 1515. He is to return on 2018 at 1500 for his next antibiotic appointment.     Rose Paz RN  2018

## 2018-08-10 ENCOUNTER — HOSPITAL ENCOUNTER (OUTPATIENT)
Dept: INFUSION THERAPY | Age: 63
Discharge: HOME | End: 2018-08-10
Payer: SELF-PAY

## 2018-08-10 VITALS
OXYGEN SATURATION: 97 % | HEART RATE: 71 BPM | DIASTOLIC BLOOD PRESSURE: 68 MMHG | RESPIRATION RATE: 18 BRPM | SYSTOLIC BLOOD PRESSURE: 105 MMHG | TEMPERATURE: 97.6 F

## 2018-08-10 LAB
BASO+EOS+MONOS # BLD AUTO: 0.6 K/UL (ref 0–2.3)
BASO+EOS+MONOS # BLD AUTO: 8 % (ref 0.1–17)
CK SERPL-CCNC: 78 U/L (ref 39–308)
CRP SERPL-MCNC: 1.6 MG/DL (ref 0–0.3)
DIFFERENTIAL METHOD BLD: ABNORMAL
ERYTHROCYTE [DISTWIDTH] IN BLOOD BY AUTOMATED COUNT: 13.3 % (ref 11.5–14.5)
ERYTHROCYTE [SEDIMENTATION RATE] IN BLOOD: 79 MM/HR (ref 0–20)
HCT VFR BLD AUTO: 37.7 % (ref 36–48)
HGB BLD-MCNC: 11.9 G/DL (ref 12–16)
LYMPHOCYTES # BLD: 1.8 K/UL (ref 1.1–5.9)
LYMPHOCYTES NFR BLD: 24 % (ref 14–44)
MCH RBC QN AUTO: 29.9 PG (ref 25–35)
MCHC RBC AUTO-ENTMCNC: 31.6 G/DL (ref 31–37)
MCV RBC AUTO: 94.7 FL (ref 78–102)
NEUTS SEG # BLD: 5.1 K/UL (ref 1.8–9.5)
NEUTS SEG NFR BLD: 68 % (ref 40–70)
PLATELET # BLD AUTO: 506 K/UL (ref 140–440)
RBC # BLD AUTO: 3.98 M/UL (ref 4.1–5.1)
WBC # BLD AUTO: 7.5 K/UL (ref 4.5–13)

## 2018-08-10 PROCEDURE — 77030020847 HC STATLOK BARD -A

## 2018-08-10 PROCEDURE — 74011250636 HC RX REV CODE- 250/636: Performed by: INTERNAL MEDICINE

## 2018-08-10 PROCEDURE — 86140 C-REACTIVE PROTEIN: CPT | Performed by: INTERNAL MEDICINE

## 2018-08-10 PROCEDURE — 74011250636 HC RX REV CODE- 250/636

## 2018-08-10 PROCEDURE — 82550 ASSAY OF CK (CPK): CPT | Performed by: INTERNAL MEDICINE

## 2018-08-10 PROCEDURE — 85025 COMPLETE CBC W/AUTO DIFF WBC: CPT | Performed by: INTERNAL MEDICINE

## 2018-08-10 PROCEDURE — A4216 STERILE WATER/SALINE, 10 ML: HCPCS | Performed by: INTERNAL MEDICINE

## 2018-08-10 PROCEDURE — 96374 THER/PROPH/DIAG INJ IV PUSH: CPT

## 2018-08-10 PROCEDURE — 85652 RBC SED RATE AUTOMATED: CPT | Performed by: INTERNAL MEDICINE

## 2018-08-10 RX ORDER — HEPARIN 100 UNIT/ML
SYRINGE INTRAVENOUS
Status: COMPLETED
Start: 2018-08-10 | End: 2018-08-10

## 2018-08-10 RX ORDER — HEPARIN SODIUM (PORCINE) LOCK FLUSH IV SOLN 100 UNIT/ML 100 UNIT/ML
500 SOLUTION INTRAVENOUS AS NEEDED
Status: DISPENSED | OUTPATIENT
Start: 2018-08-10 | End: 2018-08-11

## 2018-08-10 RX ORDER — SODIUM CHLORIDE 0.9 % (FLUSH) 0.9 %
10-40 SYRINGE (ML) INJECTION AS NEEDED
Status: DISCONTINUED | OUTPATIENT
Start: 2018-08-10 | End: 2018-08-14 | Stop reason: HOSPADM

## 2018-08-10 RX ADMIN — Medication 20 ML: at 13:30

## 2018-08-10 RX ADMIN — DAPTOMYCIN 750 MG: 500 INJECTION, POWDER, LYOPHILIZED, FOR SOLUTION INTRAVENOUS at 13:28

## 2018-08-10 RX ADMIN — SODIUM CHLORIDE, PRESERVATIVE FREE 500 UNITS: 5 INJECTION INTRAVENOUS at 13:31

## 2018-08-10 RX ADMIN — Medication 20 ML: at 13:26

## 2018-08-10 NOTE — PROGRESS NOTES
[facility] Progress Note    Date: August 10, 2018    Name: Ángel Ayon    MRN: [id number]         : 1955    [facility]  Infusion    Mr. Sameera Dubois to [facility] at 1500 accompanied by his elderly friend. Pt was assessed and education was provided. Mr. Tomi Faye vitals were reviewed. Visit Vitals    /68 (BP 1 Location: Left arm, BP Patient Position: At rest;Sitting)    Pulse 71    Temp 97.6 °F (36.4 °C)    Resp 18    SpO2 97%       Right  upper arm PICC flushed easily and had brisk blood return via both ports. PICC dressing c/d/i and not due to be changed. No swelling, redness, streaking, warmth or drainage noted in arm. Pt denied c/o pain in arm. Labs draw after10 ml waste. CPK- total, CBC, ESR,CRP sent to lab. []  Vancomycin     []  Invanz     [x]  Cubicin 750 mg      []  Rocephin    was IVP over 2 mins as ordered. PICC dressing and stat lock changed under sterile technique after cleansing site with chlorhexidine. Biopatch and skin protectant applied, and microclaves changed. No redness, streaking, warmth, or drainage noted at site. Mr. Sameera Dubois tolerated infusion, and had no complaints at this time. Single  lumens of PICC flushed with NS 10 ml and Heparin 250 units. PiCC care done using sterile technique. Green end caps applied, and lumens wrapped with guaze and paper tape. Stockinette placed over dressing for protection. Patient armband removed and shredded. Mr. Sameera Dubois was discharged from [place] [address] in stable condition at 1515. He is to return on 2018 at 1500 for his next antibiotic appointment. Patient is unable to go to [facility] on sat and sun, due to transportation issues. Prescribing physician made aware and abx extended until 18.     Juan A Stein RN  August 10, 2018

## 2018-08-11 ENCOUNTER — HOME CARE VISIT (OUTPATIENT)
Dept: SCHEDULING | Facility: HOME HEALTH | Age: 63
End: 2018-08-11
Payer: SELF-PAY

## 2018-08-11 PROCEDURE — G0155 HHCP-SVS OF CSW,EA 15 MIN: HCPCS

## 2018-08-11 PROCEDURE — G0299 HHS/HOSPICE OF RN EA 15 MIN: HCPCS

## 2018-08-12 VITALS
HEART RATE: 73 BPM | RESPIRATION RATE: 16 BRPM | DIASTOLIC BLOOD PRESSURE: 70 MMHG | SYSTOLIC BLOOD PRESSURE: 140 MMHG | TEMPERATURE: 98.4 F | OXYGEN SATURATION: 97 %

## 2018-08-13 ENCOUNTER — HOSPITAL ENCOUNTER (OUTPATIENT)
Dept: INFUSION THERAPY | Age: 63
Discharge: HOME | End: 2018-08-13
Payer: SELF-PAY

## 2018-08-13 VITALS
TEMPERATURE: 97.6 F | HEART RATE: 61 BPM | DIASTOLIC BLOOD PRESSURE: 76 MMHG | RESPIRATION RATE: 18 BRPM | OXYGEN SATURATION: 98 % | SYSTOLIC BLOOD PRESSURE: 115 MMHG

## 2018-08-13 PROCEDURE — 74011250636 HC RX REV CODE- 250/636: Performed by: INTERNAL MEDICINE

## 2018-08-13 PROCEDURE — 74011250636 HC RX REV CODE- 250/636

## 2018-08-13 PROCEDURE — 74011000250 HC RX REV CODE- 250: Performed by: INTERNAL MEDICINE

## 2018-08-13 PROCEDURE — 96374 THER/PROPH/DIAG INJ IV PUSH: CPT

## 2018-08-13 RX ORDER — HEPARIN 100 UNIT/ML
SYRINGE INTRAVENOUS
Status: DISPENSED
Start: 2018-08-13 | End: 2018-08-14

## 2018-08-13 RX ORDER — SODIUM CHLORIDE 0.9 % (FLUSH) 0.9 %
10-40 SYRINGE (ML) INJECTION AS NEEDED
Status: DISCONTINUED | OUTPATIENT
Start: 2018-08-13 | End: 2018-08-16 | Stop reason: HOSPADM

## 2018-08-13 RX ORDER — HEPARIN 100 UNIT/ML
500 SYRINGE INTRAVENOUS AS NEEDED
Status: DISCONTINUED | OUTPATIENT
Start: 2018-08-13 | End: 2018-08-16 | Stop reason: HOSPADM

## 2018-08-13 RX ADMIN — DAPTOMYCIN 750 MG: 500 INJECTION, POWDER, LYOPHILIZED, FOR SOLUTION INTRAVENOUS at 15:57

## 2018-08-13 NOTE — PROGRESS NOTES
[facility] Progress Note    Date: 2018    Name: Ángel Ayon    MRN: [id number]         : 1955    [facility] infusion    Mr. Sameera Dubois was assessed and education was provided. Mr. Tomi Faye vitals were reviewed. Visit Vitals    /76 (BP 1 Location: Right arm, BP Patient Position: Sitting)    Pulse 61    Temp 97.6 °F (36.4 °C)    Resp 18    SpO2 98%       No results found for this or any previous visit (from the past 12 hour(s)). []  Vancomycin     []  Invanz     [x]  Cubicin 750 mg     []  Rocephin    was infused IVP via purple lumen of PICC. After infusion line flushed with 10 ml normal saline followed by Heparin 500 units/5ml. Mr. Sameera Dubois tolerated infusion, and had no complaints at this time. Patient armband removed and shredded. Mr. Sameera Dubois was discharged from [place] [address] in stable condition at 1610. He is to return on 18 at 1500 for his next appointment.     Michoacano Farris RN  2018  4:19 PM

## 2018-08-14 ENCOUNTER — HOSPITAL ENCOUNTER (OUTPATIENT)
Dept: INFUSION THERAPY | Age: 63
Discharge: HOME | End: 2018-08-14
Payer: SELF-PAY

## 2018-08-14 ENCOUNTER — HOME CARE VISIT (OUTPATIENT)
Dept: SCHEDULING | Facility: HOME HEALTH | Age: 63
End: 2018-08-14
Payer: SELF-PAY

## 2018-08-14 VITALS
TEMPERATURE: 98 F | OXYGEN SATURATION: 99 % | SYSTOLIC BLOOD PRESSURE: 104 MMHG | RESPIRATION RATE: 18 BRPM | HEART RATE: 81 BPM | DIASTOLIC BLOOD PRESSURE: 64 MMHG

## 2018-08-14 VITALS
SYSTOLIC BLOOD PRESSURE: 118 MMHG | TEMPERATURE: 97.9 F | HEART RATE: 84 BPM | DIASTOLIC BLOOD PRESSURE: 74 MMHG | OXYGEN SATURATION: 98 % | RESPIRATION RATE: 20 BRPM

## 2018-08-14 PROCEDURE — 74011000250 HC RX REV CODE- 250: Performed by: INTERNAL MEDICINE

## 2018-08-14 PROCEDURE — G0300 HHS/HOSPICE OF LPN EA 15 MIN: HCPCS

## 2018-08-14 PROCEDURE — 74011250636 HC RX REV CODE- 250/636

## 2018-08-14 PROCEDURE — 74011250636 HC RX REV CODE- 250/636: Performed by: INTERNAL MEDICINE

## 2018-08-14 PROCEDURE — 96374 THER/PROPH/DIAG INJ IV PUSH: CPT

## 2018-08-14 RX ORDER — HEPARIN 100 UNIT/ML
500 SYRINGE INTRAVENOUS ONCE
Status: COMPLETED | OUTPATIENT
Start: 2018-08-14 | End: 2018-08-14

## 2018-08-14 RX ORDER — SODIUM CHLORIDE 0.9 % (FLUSH) 0.9 %
10-40 SYRINGE (ML) INJECTION AS NEEDED
Status: DISCONTINUED | OUTPATIENT
Start: 2018-08-14 | End: 2018-08-17 | Stop reason: HOSPADM

## 2018-08-14 RX ADMIN — SODIUM CHLORIDE, PRESERVATIVE FREE 500 UNITS: 5 INJECTION INTRAVENOUS at 14:43

## 2018-08-14 RX ADMIN — DAPTOMYCIN 750 MG: 500 INJECTION, POWDER, LYOPHILIZED, FOR SOLUTION INTRAVENOUS at 14:36

## 2018-08-14 RX ADMIN — Medication 20 ML: at 14:41

## 2018-08-14 NOTE — PROGRESS NOTES
[facility] Progress Note    Date: 2018    Name: Greg Andersen    MRN: [id number]         : 1955    [facility]  Infusion    Mr. Isamar Davila to [facility], ambulatory at 1400 accompanied by his elderly friend. Pt was assessed and education was provided. Mr. Ruben Chauhan vitals were reviewed. Visit Vitals    /64 (BP 1 Location: Left arm, BP Patient Position: Sitting)    Pulse 81    Temp 98 °F (36.7 °C)    Resp 18    SpO2 99%       Right  upper arm PICC flushed easily and had brisk blood return via both ports. PICC dressing c/d/i and not due to be changed. No swelling, redness, streaking, warmth or drainage noted in arm. Pt denied c/o pain in arm.     []  Vancomycin     []  Invanz     [x]  Cubicin 750 mg      []  Rocephin    was IVP over 2 mins as ordered. PICC dressing and stat lock changed under sterile technique after cleansing site with chlorhexidine. Biopatch and skin protectant applied, and microclaves changed. No redness, streaking, warmth, or drainage noted at site. Mr. Isamar Davila tolerated infusion, and had no complaints at this time. Single  lumens of PICC flushed with NS 10 ml and Heparin 250 units. PiCC care done using sterile technique. Green end caps applied, and lumens wrapped with guaze and paper tape. Stockinette placed over dressing for protection. Patient armband removed and shredded. Mr. Isamar Davila was discharged from [place] [address] in stable condition at 12. He is to return on 2018 at 1500 for his next antibiotic appointment. Patient stated he has a follow up appt on 2018       Greg Leiva RN  2018

## 2018-08-15 ENCOUNTER — HOSPITAL ENCOUNTER (OUTPATIENT)
Dept: INFUSION THERAPY | Age: 63
Discharge: HOME | End: 2018-08-15
Payer: SELF-PAY

## 2018-08-15 VITALS
DIASTOLIC BLOOD PRESSURE: 72 MMHG | HEART RATE: 72 BPM | TEMPERATURE: 97.2 F | OXYGEN SATURATION: 98 % | SYSTOLIC BLOOD PRESSURE: 117 MMHG | RESPIRATION RATE: 18 BRPM

## 2018-08-15 PROCEDURE — 74011000250 HC RX REV CODE- 250: Performed by: INTERNAL MEDICINE

## 2018-08-15 PROCEDURE — 74011250636 HC RX REV CODE- 250/636: Performed by: INTERNAL MEDICINE

## 2018-08-15 PROCEDURE — 96374 THER/PROPH/DIAG INJ IV PUSH: CPT

## 2018-08-15 PROCEDURE — 74011250636 HC RX REV CODE- 250/636

## 2018-08-15 RX ORDER — HEPARIN 100 UNIT/ML
500 SYRINGE INTRAVENOUS ONCE
Status: COMPLETED | OUTPATIENT
Start: 2018-08-15 | End: 2018-08-15

## 2018-08-15 RX ORDER — SODIUM CHLORIDE 0.9 % (FLUSH) 0.9 %
10-40 SYRINGE (ML) INJECTION AS NEEDED
Status: DISCONTINUED | OUTPATIENT
Start: 2018-08-15 | End: 2018-08-18 | Stop reason: HOSPADM

## 2018-08-15 RX ADMIN — Medication 10 ML: at 13:48

## 2018-08-15 RX ADMIN — Medication 20 ML: at 13:45

## 2018-08-15 RX ADMIN — DAPTOMYCIN 750 MG: 500 INJECTION, POWDER, LYOPHILIZED, FOR SOLUTION INTRAVENOUS at 13:45

## 2018-08-15 RX ADMIN — SODIUM CHLORIDE, PRESERVATIVE FREE 500 UNITS: 5 INJECTION INTRAVENOUS at 13:48

## 2018-08-15 NOTE — PROGRESS NOTES
[facility] Progress Note    Date: August 15, 2018    Name: Criss Delgadillo    MRN: [id number]         : 1955    [facility]  Infusion    Mr. Wu to [facility], ambulatory at [phone]. Pt was assessed and education was provided. Mr. Clau Mcneal vitals were reviewed. Visit Vitals    /72 (BP 1 Location: Left arm, BP Patient Position: Sitting)    Pulse 72    Temp 97.2 °F (36.2 °C)    Resp 18    SpO2 98%       Right  upper arm PICC flushed easily and had brisk blood return via both ports. PICC dressing c/d/i and not due to be changed. No swelling, redness, streaking, warmth or drainage noted in arm. Pt denied c/o pain in arm.     []  Vancomycin     []  Invanz     [x]  Cubicin 750 mg      []  Rocephin    was IVP over 2 mins as ordered. PICC dressing and stat lock changed under sterile technique after cleansing site with chlorhexidine. Biopatch and skin protectant applied, and microclaves changed. No redness, streaking, warmth, or drainage noted at site. Mr. Katie Jacobs tolerated infusion, and had no complaints at this time. Single  lumens of PICC flushed with NS 10 ml and Heparin 250 units. PiCC care done using sterile technique. Green end caps applied, and lumens wrapped with guaze and paper tape. Stockinette placed over dressing for protection. Patient armband removed and shredded. Mr. Katie Jacobs was discharged from [place] [address] in stable condition at 1355. He is to return on 2018 at 1500 for his next antibiotic appointment.        Alf Boyle RN  August 15, 2018

## 2018-08-16 ENCOUNTER — HOME CARE VISIT (OUTPATIENT)
Dept: SCHEDULING | Facility: HOME HEALTH | Age: 63
End: 2018-08-16
Payer: SELF-PAY

## 2018-08-16 ENCOUNTER — HOME CARE VISIT (OUTPATIENT)
Dept: HOME HEALTH SERVICES | Facility: HOME HEALTH | Age: 63
End: 2018-08-16
Payer: SELF-PAY

## 2018-08-16 ENCOUNTER — HOSPITAL ENCOUNTER (OUTPATIENT)
Dept: INFUSION THERAPY | Age: 63
Discharge: HOME | End: 2018-08-16
Payer: SELF-PAY

## 2018-08-16 VITALS
DIASTOLIC BLOOD PRESSURE: 80 MMHG | RESPIRATION RATE: 20 BRPM | TEMPERATURE: 98.4 F | SYSTOLIC BLOOD PRESSURE: 122 MMHG | OXYGEN SATURATION: 99 % | HEART RATE: 88 BPM

## 2018-08-16 VITALS
RESPIRATION RATE: 18 BRPM | SYSTOLIC BLOOD PRESSURE: 117 MMHG | TEMPERATURE: 97.6 F | DIASTOLIC BLOOD PRESSURE: 75 MMHG | OXYGEN SATURATION: 99 % | HEART RATE: 72 BPM

## 2018-08-16 LAB
BASO+EOS+MONOS # BLD AUTO: 0.4 K/UL (ref 0–2.3)
BASO+EOS+MONOS # BLD AUTO: 8 % (ref 0.1–17)
CK SERPL-CCNC: 96 U/L (ref 39–308)
CRP SERPL-MCNC: <0.3 MG/DL (ref 0–0.3)
DIFFERENTIAL METHOD BLD: ABNORMAL
ERYTHROCYTE [DISTWIDTH] IN BLOOD BY AUTOMATED COUNT: 12.8 % (ref 11.5–14.5)
ERYTHROCYTE [SEDIMENTATION RATE] IN BLOOD: 53 MM/HR (ref 0–20)
HCT VFR BLD AUTO: 37 % (ref 36–48)
HGB BLD-MCNC: 12.2 G/DL (ref 12–16)
LYMPHOCYTES # BLD: 2.1 K/UL (ref 1.1–5.9)
LYMPHOCYTES NFR BLD: 40 % (ref 14–44)
MCH RBC QN AUTO: 30.7 PG (ref 25–35)
MCHC RBC AUTO-ENTMCNC: 33 G/DL (ref 31–37)
MCV RBC AUTO: 93 FL (ref 78–102)
NEUTS SEG # BLD: 2.9 K/UL (ref 1.8–9.5)
NEUTS SEG NFR BLD: 53 % (ref 40–70)
PLATELET # BLD AUTO: 333 K/UL (ref 140–440)
RBC # BLD AUTO: 3.98 M/UL (ref 4.1–5.1)
WBC # BLD AUTO: 5.4 K/UL (ref 4.5–13)

## 2018-08-16 PROCEDURE — 74011250636 HC RX REV CODE- 250/636: Performed by: INTERNAL MEDICINE

## 2018-08-16 PROCEDURE — 96374 THER/PROPH/DIAG INJ IV PUSH: CPT

## 2018-08-16 PROCEDURE — 74011000250 HC RX REV CODE- 250: Performed by: INTERNAL MEDICINE

## 2018-08-16 PROCEDURE — 74011250636 HC RX REV CODE- 250/636

## 2018-08-16 PROCEDURE — 86140 C-REACTIVE PROTEIN: CPT | Performed by: INTERNAL MEDICINE

## 2018-08-16 PROCEDURE — 82550 ASSAY OF CK (CPK): CPT | Performed by: INTERNAL MEDICINE

## 2018-08-16 PROCEDURE — 85025 COMPLETE CBC W/AUTO DIFF WBC: CPT | Performed by: INTERNAL MEDICINE

## 2018-08-16 PROCEDURE — G0300 HHS/HOSPICE OF LPN EA 15 MIN: HCPCS

## 2018-08-16 PROCEDURE — 85652 RBC SED RATE AUTOMATED: CPT | Performed by: INTERNAL MEDICINE

## 2018-08-16 RX ORDER — SODIUM CHLORIDE 0.9 % (FLUSH) 0.9 %
10-40 SYRINGE (ML) INJECTION AS NEEDED
Status: DISCONTINUED | OUTPATIENT
Start: 2018-08-16 | End: 2018-08-20 | Stop reason: HOSPADM

## 2018-08-16 RX ORDER — HEPARIN 100 UNIT/ML
500 SYRINGE INTRAVENOUS ONCE
Status: COMPLETED | OUTPATIENT
Start: 2018-08-16 | End: 2018-08-16

## 2018-08-16 RX ADMIN — DAPTOMYCIN 750 MG: 500 INJECTION, POWDER, LYOPHILIZED, FOR SOLUTION INTRAVENOUS at 15:25

## 2018-08-16 RX ADMIN — Medication 10 ML: at 15:25

## 2018-08-16 RX ADMIN — SODIUM CHLORIDE, PRESERVATIVE FREE 500 UNITS: 5 INJECTION INTRAVENOUS at 15:29

## 2018-08-16 NOTE — PROGRESS NOTES
[facility] Progress Note    Date: 2018    Name: German Garvey    MRN: [id number]         : 1955    [facility]  Infusion    Mr. Wu to [facility], ambulatory at [phone]. Pt was assessed and education was provided. Mr. Kristi Sheikh vitals were reviewed. Visit Vitals    /75 (BP 1 Location: Left arm, BP Patient Position: Sitting)    Pulse 72    Temp 97.6 °F (36.4 °C)    Resp 18    SpO2 99%       Right  upper arm PICC flushed easily and had brisk blood return via single port. Labs drawn after 10 ml waste. CBC, ESR,CRP, CPK sent to lab. PICC dressing c/d/i and not due to be changed. No swelling, redness, streaking, warmth or drainage noted in arm. Pt denied c/o pain in arm.     []  Vancomycin     []  Invanz     [x]  Cubicin 750 mg      []  Rocephin    was IVP over 2 mins as ordered. Mr. Guillermina Osman tolerated infusion, and had no complaints at this time. Single  lumens of PICC flushed with NS 10 ml and Heparin 250 units. PiCC care done using sterile technique. Green end caps applied, and lumens wrapped with guaze and paper tape. Stockinette placed over dressing for protection. Patient armband removed and shredded. Mr. Guillermina Osman was discharged from [place] [address] in stable condition at [place]. Mr. Guillermina Osman was d/c from [facility] today. Patient has an appt tomorrow with Prescribing physician.         Gwen Barros RN  2018

## 2018-08-18 ENCOUNTER — HOME CARE VISIT (OUTPATIENT)
Dept: HOME HEALTH SERVICES | Facility: HOME HEALTH | Age: 63
End: 2018-08-18
Payer: SELF-PAY

## 2018-11-04 ENCOUNTER — HOSPITAL ENCOUNTER (EMERGENCY)
Age: 63
Discharge: HOME | End: 2018-11-04
Attending: EMERGENCY MEDICINE
Payer: SELF-PAY

## 2018-11-04 VITALS
HEART RATE: 69 BPM | TEMPERATURE: 97.8 F | BODY MASS INDEX: 27.32 KG/M2 | SYSTOLIC BLOOD PRESSURE: 170 MMHG | RESPIRATION RATE: 18 BRPM | OXYGEN SATURATION: 100 % | WEIGHT: 185 LBS | DIASTOLIC BLOOD PRESSURE: 98 MMHG

## 2018-11-04 DIAGNOSIS — L02.91 ABSCESS: Primary | ICD-10-CM

## 2018-11-04 DIAGNOSIS — L03.112 CELLULITIS OF LEFT AXILLA: ICD-10-CM

## 2018-11-04 LAB
ANION GAP SERPL CALC-SCNC: 6 MMOL/L (ref 3–18)
BASOPHILS # BLD: 0 K/UL (ref 0–0.1)
BASOPHILS NFR BLD: 0 % (ref 0–2)
BUN SERPL-MCNC: 12 MG/DL (ref 7–18)
BUN/CREAT SERPL: 15 (ref 12–20)
CALCIUM SERPL-MCNC: 8.4 MG/DL (ref 8.5–10.1)
CHLORIDE SERPL-SCNC: 108 MMOL/L (ref 100–108)
CO2 SERPL-SCNC: 26 MMOL/L (ref 21–32)
CREAT SERPL-MCNC: 0.81 MG/DL (ref 0.6–1.3)
DIFFERENTIAL METHOD BLD: ABNORMAL
EOSINOPHIL # BLD: 0.1 K/UL (ref 0–0.4)
EOSINOPHIL NFR BLD: 2 % (ref 0–5)
ERYTHROCYTE [DISTWIDTH] IN BLOOD BY AUTOMATED COUNT: 13.3 % (ref 11.6–14.5)
GLUCOSE SERPL-MCNC: 85 MG/DL (ref 74–99)
HCT VFR BLD AUTO: 39 % (ref 36–48)
HGB BLD-MCNC: 12.8 G/DL (ref 13–16)
LYMPHOCYTES # BLD: 1.6 K/UL (ref 0.9–3.6)
LYMPHOCYTES NFR BLD: 19 % (ref 21–52)
MCH RBC QN AUTO: 30.6 PG (ref 24–34)
MCHC RBC AUTO-ENTMCNC: 32.8 G/DL (ref 31–37)
MCV RBC AUTO: 93.3 FL (ref 74–97)
MONOCYTES # BLD: 0.6 K/UL (ref 0.05–1.2)
MONOCYTES NFR BLD: 7 % (ref 3–10)
NEUTS SEG # BLD: 5.9 K/UL (ref 1.8–8)
NEUTS SEG NFR BLD: 72 % (ref 40–73)
PLATELET # BLD AUTO: 225 K/UL (ref 135–420)
PMV BLD AUTO: 9.1 FL (ref 9.2–11.8)
POTASSIUM SERPL-SCNC: 3.7 MMOL/L (ref 3.5–5.5)
RBC # BLD AUTO: 4.18 M/UL (ref 4.7–5.5)
SODIUM SERPL-SCNC: 140 MMOL/L (ref 136–145)
WBC # BLD AUTO: 8.2 K/UL (ref 4.6–13.2)

## 2018-11-04 PROCEDURE — 83605 ASSAY OF LACTIC ACID: CPT

## 2018-11-04 PROCEDURE — 80048 BASIC METABOLIC PNL TOTAL CA: CPT | Performed by: EMERGENCY MEDICINE

## 2018-11-04 PROCEDURE — 87070 CULTURE OTHR SPECIMN AEROBIC: CPT | Performed by: EMERGENCY MEDICINE

## 2018-11-04 PROCEDURE — 75810000289 HC I&D ABSCESS SIMP/COMP/MULT

## 2018-11-04 PROCEDURE — 99283 EMERGENCY DEPT VISIT LOW MDM: CPT

## 2018-11-04 PROCEDURE — 77030019895 HC PCKNG STRP IODO -A

## 2018-11-04 PROCEDURE — 85025 COMPLETE CBC W/AUTO DIFF WBC: CPT | Performed by: EMERGENCY MEDICINE

## 2018-11-04 PROCEDURE — 87186 SC STD MICRODIL/AGAR DIL: CPT | Performed by: EMERGENCY MEDICINE

## 2018-11-04 PROCEDURE — 87077 CULTURE AEROBIC IDENTIFY: CPT | Performed by: EMERGENCY MEDICINE

## 2018-11-04 PROCEDURE — 87040 BLOOD CULTURE FOR BACTERIA: CPT | Performed by: EMERGENCY MEDICINE

## 2018-11-04 RX ORDER — SULFAMETHOXAZOLE AND TRIMETHOPRIM 800; 160 MG/1; MG/1
2 TABLET ORAL 2 TIMES DAILY
Qty: 40 TAB | Refills: 0 | Status: SHIPPED | OUTPATIENT
Start: 2018-11-04 | End: 2018-11-14

## 2018-11-04 RX ORDER — CEPHALEXIN 500 MG/1
500 CAPSULE ORAL 4 TIMES DAILY
Qty: 40 CAP | Refills: 0 | Status: SHIPPED | OUTPATIENT
Start: 2018-11-04 | End: 2018-11-14

## 2018-11-04 NOTE — ED PROVIDER NOTES
EMERGENCY DEPARTMENT HISTORY AND PHYSICAL EXAM    11:20 AM      Date: 11/4/2018  Patient Name: Gurjit Elizalde    History of Presenting Illness     Chief Complaint   Patient presents with    Abscess         History Provided By: Patient    Chief Complaint: Abscess  Duration:  5 days  Timing:  worsening  Location: left axilla  Quality: draining  Severity: 9 out of 10  Modifying Factors: No modifying or aggravating factors were reported. Associated Symptoms: no associated symptoms were mentioned. Additional History (Context): Gurjit Elizalde is a 59 y.o. malewith PMHx of skin cancer presents with worsening, draining left axilla abscess onset about 5 days ago. He states the pain is 9 out of 10. No associated symptoms or modifying symptoms were mentioned. He reports no hx of diabetes and states he has no PCP. He states he was hospitalized previous with MRSA infection. No other concerns or symptoms at this time. PCP: Carolyn Lee MD        Past History     Past Medical History:  Past Medical History:   Diagnosis Date    Skin cancer        Past Surgical History:  History reviewed. No pertinent surgical history. Family History:  History reviewed. No pertinent family history. Social History:  Social History     Tobacco Use    Smoking status: Current Some Day Smoker    Smokeless tobacco: Current User   Substance Use Topics    Alcohol use: Not on file    Drug use: Not on file       Allergies: Allergies   Allergen Reactions    Amoxicillin Hives    Other Medication Other (comments)     No narcotics/hx addiction    Penicillins Hives         Review of Systems     Review of Systems   Constitutional: Negative for diaphoresis and fever. HENT: Negative for congestion. Respiratory: Negative for cough and shortness of breath. Cardiovascular: Negative for chest pain. Gastrointestinal: Negative for abdominal pain and nausea. Musculoskeletal: Negative for back pain.    Skin: Negative for rash.        Positive for L axilla abscess. Neurological: Negative for dizziness. All other systems reviewed and are negative. Physical Exam     Patient Vitals for the past 12 hrs:   Temp Pulse Resp BP SpO2   11/04/18 1109 97.8 °F (36.6 °C) (!) 57 17 (!) 167/102 100 %         Physical Exam   Constitutional: Vital signs are normal. He appears well-developed and well-nourished. He is active. Non-toxic appearance. He does not appear ill. No distress. HENT:   Head: Normocephalic and atraumatic. Neck: Normal range of motion. Neck supple. Carotid bruit is not present. No tracheal deviation present. No thyromegaly present. Cardiovascular: Normal rate, regular rhythm and normal heart sounds. Exam reveals no gallop and no friction rub. No murmur heard. Pulmonary/Chest: Effort normal and breath sounds normal. No stridor. No respiratory distress. He has no wheezes. He has no rales. He exhibits no tenderness. Abdominal: Soft. He exhibits no distension and no mass. There is no tenderness. There is no rebound, no guarding and no CVA tenderness. Musculoskeletal: Normal range of motion. Neurological: He is alert. Skin: Skin is warm, dry and intact. Rash noted. He is not diaphoretic. No pallor. Under left axilla there are four tender swollen lesions, erythematous. Only the inferior one is mildly fluctuant; others are firm, nodular. Confluent macular erythema extending onto left chest wall. Size of cellulitis is an additional 4cm. Psychiatric: He has a normal mood and affect. His speech is normal and behavior is normal. Judgment and thought content normal.   Nursing note and vitals reviewed.           Diagnostic Study Results   Labs -  Recent Results (from the past 12 hour(s))   CBC WITH AUTOMATED DIFF    Collection Time: 11/04/18 11:55 AM   Result Value Ref Range    WBC 8.2 4.6 - 13.2 K/uL    RBC 4.18 (L) 4.70 - 5.50 M/uL    HGB 12.8 (L) 13.0 - 16.0 g/dL    HCT 39.0 36.0 - 48.0 %    MCV 93.3 74.0 - 97.0 FL    MCH 30.6 24.0 - 34.0 PG    MCHC 32.8 31.0 - 37.0 g/dL    RDW 13.3 11.6 - 14.5 %    PLATELET 365 315 - 767 K/uL    MPV 9.1 (L) 9.2 - 11.8 FL    NEUTROPHILS 72 40 - 73 %    LYMPHOCYTES 19 (L) 21 - 52 %    MONOCYTES 7 3 - 10 %    EOSINOPHILS 2 0 - 5 %    BASOPHILS 0 0 - 2 %    ABS. NEUTROPHILS 5.9 1.8 - 8.0 K/UL    ABS. LYMPHOCYTES 1.6 0.9 - 3.6 K/UL    ABS. MONOCYTES 0.6 0.05 - 1.2 K/UL    ABS. EOSINOPHILS 0.1 0.0 - 0.4 K/UL    ABS. BASOPHILS 0.0 0.0 - 0.1 K/UL    DF AUTOMATED         Radiologic Studies -   No orders to display     No results found. Medications ordered:   Medications - No data to display      Medical Decision Making   Initial Medical Decision Making and DD  I&D performed; wound and blood cultures sent in pt with h/o MRSA. I&D Abcess Complex  Date/Time: 11/4/2018 12:16 PM  Performed by: WESTON Ferreira  Authorized by: WESTON Ferreira     Consent:     Consent obtained:  Written    Consent given by:  Patient    Risks discussed:  Pain and incomplete drainage  Location:     Type:  Abscess    Location:  Upper extremity    Upper extremity location:  Shoulder  Anesthesia (see MAR for exact dosages): Anesthesia method:  Local infiltration    Local anesthetic:  Lidocaine 1% w/o epi  Procedure type:     Complexity:  Complex  Procedure details:     Needle aspiration: yes      Incision types:  Single with marsupialization    Incision depth:  Dermal    Scalpel blade:  11    Wound management:  Probed and deloculated and irrigated with saline    Drainage:  Bloody and purulent    Packing materials:  1/4 in iodoform gauze    Amount 1/4\" iodoform:  1  Post-procedure details:     Patient tolerance of procedure: Tolerated well, no immediate complications      ED Course: Progress Notes, Reevaluation, and Consults:     Sent wound and blood cultures and baseline labs obtained. H/o MRSA; start on Keflex and Bactrim DS. Normal lactic at 0.48. I am the first provider for this patient.     I reviewed the vital signs, available nursing notes, past medical history, past surgical history, family history and social history. Vital Signs-Reviewed the patient's vital signs. Pulse Oximetry Analysis - 100%    Cardiac Monitor:  Rate: 57 BPM      Records Reviewed: Nursing Notes and Old Medical Records (Time of Review: 11:20 AM)      Diagnosis     Clinical Impression: No diagnosis found. Disposition: home    Follow-up Information    None             Medication List      You have not been prescribed any medications. _______________________________    Attestations:  Scribe Attestation     Gulshan Swift acting as a scribe for and in the presence of Charito Erazo    November 04, 2018 at 11:20 AM       Provider Attestation:      I personally performed the services described in the documentation, reviewed the documentation, as recorded by the scribe in my presence, and it accurately and completely records my words and actions.  November 04, 2018 at 11:20 AM - Tee Babcock PA-C  _________________________  ______

## 2018-11-04 NOTE — DISCHARGE INSTRUCTIONS
Skin Abscess: Care Instructions  Your Care Instructions    A skin abscess is a bacterial infection that forms a pocket of pus. A boil is a kind of skin abscess. The doctor may have cut an opening in the abscess so that the pus can drain out. You may have gauze in the cut so that the abscess will stay open and keep draining. You may need antibiotics. You will need to follow up with your doctor to make sure the infection has gone away. The doctor has checked you carefully, but problems can develop later. If you notice any problems or new symptoms, get medical treatment right away. Follow-up care is a key part of your treatment and safety. Be sure to make and go to all appointments, and call your doctor if you are having problems. It's also a good idea to know your test results and keep a list of the medicines you take. How can you care for yourself at home? · Apply warm and dry compresses, a heating pad set on low, or a hot water bottle 3 or 4 times a day for pain. Keep a cloth between the heat source and your skin. · If your doctor prescribed antibiotics, take them as directed. Do not stop taking them just because you feel better. You need to take the full course of antibiotics. · Take pain medicines exactly as directed. ? If the doctor gave you a prescription medicine for pain, take it as prescribed. ? If you are not taking a prescription pain medicine, ask your doctor if you can take an over-the-counter medicine. · Keep your bandage clean and dry. Change the bandage whenever it gets wet or dirty, or at least one time a day. · If the abscess was packed with gauze:  ? Keep follow-up appointments to have the gauze changed or removed. If the doctor instructed you to remove the gauze, follow the instructions you were given for how to remove it. ? After the gauze is removed, soak the area in warm water for 15 to 20 minutes 2 times a day, until the wound closes. When should you call for help?   Call your doctor now or seek immediate medical care if:    · You have signs of worsening infection, such as:  ? Increased pain, swelling, warmth, or redness. ? Red streaks leading from the infected skin. ? Pus draining from the wound. ? A fever.    Watch closely for changes in your health, and be sure to contact your doctor if:    · You do not get better as expected. Where can you learn more? Go to http://tiffanie-elliot.info/. Enter R349 in the search box to learn more about \"Skin Abscess: Care Instructions. \"  Current as of: April 18, 2018  Content Version: 11.8  © 8613-7700 [employer]. Care instructions adapted under license by [employer] (which disclaims liability or warranty for this information). If you have questions about a medical condition or this instruction, always ask your healthcare professional. [address] any warranty or liability for your use of this information. Cellulitis: Care Instructions  Your Care Instructions    Cellulitis is a skin infection caused by bacteria, most often strep or staph. It often occurs after a break in the skin from a scrape, cut, bite, or puncture, or after a rash. Cellulitis may be treated without doing tests to find out what caused it. But your doctor may do tests, if needed, to look for a specific bacteria, like methicillin-resistant Staphylococcus aureus (MRSA). The doctor has checked you carefully, but problems can develop later. If you notice any problems or new symptoms, get medical treatment right away. Follow-up care is a key part of your treatment and safety. Be sure to make and go to all appointments, and call your doctor if you are having problems. It's also a good idea to know your test results and keep a list of the medicines you take. How can you care for yourself at home? · Take your antibiotics as directed. Do not stop taking them just because you feel better.  You need to take the full course of antibiotics. · Prop up the infected area on pillows to reduce pain and swelling. Try to keep the area above the level of your heart as often as you can. · If your doctor told you how to care for your wound, follow your doctor's instructions. If you did not get instructions, follow this general advice:  ? Wash the wound with clean water 2 times a day. Don't use hydrogen peroxide or alcohol, which can slow healing. ? You may cover the wound with a thin layer of petroleum jelly, such as Vaseline, and a nonstick bandage. ? Apply more petroleum jelly and replace the bandage as needed. · Be safe with medicines. Take pain medicines exactly as directed. ? If the doctor gave you a prescription medicine for pain, take it as prescribed. ? If you are not taking a prescription pain medicine, ask your doctor if you can take an over-the-counter medicine. To prevent cellulitis in the future  · Try to prevent cuts, scrapes, or other injuries to your skin. Cellulitis most often occurs where there is a break in the skin. · If you get a scrape, cut, mild burn, or bite, wash the wound with clean water as soon as you can to help avoid infection. Don't use hydrogen peroxide or alcohol, which can slow healing. · If you have swelling in your legs (edema), support stockings and good skin care may help prevent leg sores and cellulitis. · Take care of your feet, especially if you have diabetes or other conditions that increase the risk of infection. Wear shoes and socks. Do not go barefoot. If you have athlete's foot or other skin problems on your feet, talk to your doctor about how to treat them. When should you call for help? Call your doctor now or seek immediate medical care if:    · You have signs that your infection is getting worse, such as:  ? Increased pain, swelling, warmth, or redness. ? Red streaks leading from the area. ? Pus draining from the area.   ? A fever.     · You get a rash.    Watch closely for changes in your health, and be sure to contact your doctor if:    · You do not get better as expected. Where can you learn more? Go to http://tiffanie-elliot.info/. Cali Odell in the search box to learn more about \"Cellulitis: Care Instructions. \"  Current as of: April 18, 2018  Content Version: 11.8  © 3602-9674 [employer]. Care instructions adapted under license by [employer] (which disclaims liability or warranty for this information). If you have questions about a medical condition or this instruction, always ask your healthcare professional. [place] [address] any warranty or liability for your use of this information.

## 2018-11-05 LAB — LACTATE BLD-SCNC: 0.48 MMOL/L (ref 0.4–2)

## 2018-11-05 NOTE — PROGRESS NOTES
11/5/18  0900    Received a call from pt stating he was seen here in ED yesterday and was given 2 prescriptions and went to [facility] to fill but they are not $4 as was told. Will review chart and call back pt [phone], pt understood. 0930  Placed a call to pt and left a message to return call. 1330  Pt left a message to call back. Called pt but no answer and left message to return call.

## 2018-11-05 NOTE — ED NOTES
Noted 1+ blood culture, gram + cocci in pairs and chains,  Pt seen for abscess, MRSA, started on Bactrim. No signs of sepsis.

## 2018-11-06 ENCOUNTER — APPOINTMENT (OUTPATIENT)
Dept: CT IMAGING | Age: 63
End: 2018-11-06
Attending: EMERGENCY MEDICINE
Payer: SELF-PAY

## 2018-11-06 ENCOUNTER — HOSPITAL ENCOUNTER (EMERGENCY)
Age: 63
Discharge: HOME | End: 2018-11-06
Attending: EMERGENCY MEDICINE
Payer: SELF-PAY

## 2018-11-06 VITALS
HEIGHT: 69 IN | OXYGEN SATURATION: 98 % | TEMPERATURE: 98.2 F | WEIGHT: 203 LBS | HEART RATE: 63 BPM | RESPIRATION RATE: 15 BRPM | SYSTOLIC BLOOD PRESSURE: 142 MMHG | BODY MASS INDEX: 30.07 KG/M2 | DIASTOLIC BLOOD PRESSURE: 86 MMHG

## 2018-11-06 DIAGNOSIS — L73.2 HIDRADENITIS SUPPURATIVA OF LEFT AXILLA: Primary | ICD-10-CM

## 2018-11-06 LAB
ALBUMIN SERPL-MCNC: 3.2 G/DL (ref 3.4–5)
ALBUMIN/GLOB SERPL: 0.8 {RATIO} (ref 0.8–1.7)
ALP SERPL-CCNC: 102 U/L (ref 45–117)
ALT SERPL-CCNC: 21 U/L (ref 16–61)
ANION GAP SERPL CALC-SCNC: 5 MMOL/L (ref 3–18)
AST SERPL-CCNC: 22 U/L (ref 15–37)
BACTERIA SPEC CULT: ABNORMAL
BACTERIA SPEC CULT: ABNORMAL
BASOPHILS # BLD: 0 K/UL (ref 0–0.1)
BASOPHILS NFR BLD: 0 % (ref 0–2)
BILIRUB SERPL-MCNC: 0.3 MG/DL (ref 0.2–1)
BUN SERPL-MCNC: 10 MG/DL (ref 7–18)
BUN/CREAT SERPL: 12 (ref 12–20)
CALCIUM SERPL-MCNC: 8.5 MG/DL (ref 8.5–10.1)
CHLORIDE SERPL-SCNC: 109 MMOL/L (ref 100–108)
CO2 SERPL-SCNC: 26 MMOL/L (ref 21–32)
CREAT SERPL-MCNC: 0.85 MG/DL (ref 0.6–1.3)
DIFFERENTIAL METHOD BLD: ABNORMAL
EOSINOPHIL # BLD: 0.1 K/UL (ref 0–0.4)
EOSINOPHIL NFR BLD: 1 % (ref 0–5)
ERYTHROCYTE [DISTWIDTH] IN BLOOD BY AUTOMATED COUNT: 13.2 % (ref 11.6–14.5)
GLOBULIN SER CALC-MCNC: 4 G/DL (ref 2–4)
GLUCOSE SERPL-MCNC: 98 MG/DL (ref 74–99)
GRAM STN SPEC: ABNORMAL
GRAM STN SPEC: ABNORMAL
HCT VFR BLD AUTO: 40.5 % (ref 36–48)
HGB BLD-MCNC: 13.2 G/DL (ref 13–16)
LACTATE BLD-SCNC: 0.37 MMOL/L (ref 0.4–2)
LYMPHOCYTES # BLD: 1.4 K/UL (ref 0.9–3.6)
LYMPHOCYTES NFR BLD: 15 % (ref 21–52)
MCH RBC QN AUTO: 30.4 PG (ref 24–34)
MCHC RBC AUTO-ENTMCNC: 32.6 G/DL (ref 31–37)
MCV RBC AUTO: 93.3 FL (ref 74–97)
MONOCYTES # BLD: 0.9 K/UL (ref 0.05–1.2)
MONOCYTES NFR BLD: 9 % (ref 3–10)
NEUTS SEG # BLD: 6.9 K/UL (ref 1.8–8)
NEUTS SEG NFR BLD: 75 % (ref 40–73)
PLATELET # BLD AUTO: 257 K/UL (ref 135–420)
PMV BLD AUTO: 8.9 FL (ref 9.2–11.8)
POTASSIUM SERPL-SCNC: 3.8 MMOL/L (ref 3.5–5.5)
PROT SERPL-MCNC: 7.2 G/DL (ref 6.4–8.2)
RBC # BLD AUTO: 4.34 M/UL (ref 4.7–5.5)
SERVICE CMNT-IMP: ABNORMAL
SODIUM SERPL-SCNC: 140 MMOL/L (ref 136–145)
WBC # BLD AUTO: 9.3 K/UL (ref 4.6–13.2)

## 2018-11-06 PROCEDURE — 80053 COMPREHEN METABOLIC PANEL: CPT | Performed by: EMERGENCY MEDICINE

## 2018-11-06 PROCEDURE — 74011000258 HC RX REV CODE- 258: Performed by: EMERGENCY MEDICINE

## 2018-11-06 PROCEDURE — 99283 EMERGENCY DEPT VISIT LOW MDM: CPT

## 2018-11-06 PROCEDURE — 74011250637 HC RX REV CODE- 250/637: Performed by: EMERGENCY MEDICINE

## 2018-11-06 PROCEDURE — 85025 COMPLETE CBC W/AUTO DIFF WBC: CPT | Performed by: EMERGENCY MEDICINE

## 2018-11-06 PROCEDURE — 83605 ASSAY OF LACTIC ACID: CPT

## 2018-11-06 PROCEDURE — 96366 THER/PROPH/DIAG IV INF ADDON: CPT

## 2018-11-06 PROCEDURE — 96368 THER/DIAG CONCURRENT INF: CPT

## 2018-11-06 PROCEDURE — 73201 CT UPPER EXTREMITY W/DYE: CPT

## 2018-11-06 PROCEDURE — 74011250636 HC RX REV CODE- 250/636: Performed by: EMERGENCY MEDICINE

## 2018-11-06 PROCEDURE — 74011636320 HC RX REV CODE- 636/320: Performed by: EMERGENCY MEDICINE

## 2018-11-06 PROCEDURE — 87040 BLOOD CULTURE FOR BACTERIA: CPT | Performed by: EMERGENCY MEDICINE

## 2018-11-06 PROCEDURE — 96365 THER/PROPH/DIAG IV INF INIT: CPT

## 2018-11-06 RX ORDER — CEFTRIAXONE 1 G/1
1 INJECTION, POWDER, FOR SOLUTION INTRAMUSCULAR; INTRAVENOUS
Status: DISCONTINUED | OUTPATIENT
Start: 2018-11-06 | End: 2018-11-06

## 2018-11-06 RX ORDER — VANCOMYCIN 2 GRAM/500 ML IN 0.9 % SODIUM CHLORIDE INTRAVENOUS
2000 ONCE
Status: DISCONTINUED | OUTPATIENT
Start: 2018-11-06 | End: 2018-11-06 | Stop reason: HOSPADM

## 2018-11-06 RX ORDER — OXYCODONE AND ACETAMINOPHEN 5; 325 MG/1; MG/1
1 TABLET ORAL
Status: COMPLETED | OUTPATIENT
Start: 2018-11-06 | End: 2018-11-06

## 2018-11-06 RX ORDER — TRAMADOL HYDROCHLORIDE 50 MG/1
50 TABLET ORAL
Qty: 6 TAB | Refills: 0 | Status: SHIPPED | OUTPATIENT
Start: 2018-11-06 | End: 2019-03-28

## 2018-11-06 RX ORDER — CLINDAMYCIN HYDROCHLORIDE 150 MG/1
300 CAPSULE ORAL 4 TIMES DAILY
Qty: 56 CAP | Refills: 0 | Status: SHIPPED | OUTPATIENT
Start: 2018-11-06 | End: 2018-11-13

## 2018-11-06 RX ADMIN — IOPAMIDOL 90 ML: 612 INJECTION, SOLUTION INTRAVENOUS at 10:52

## 2018-11-06 RX ADMIN — OXYCODONE HYDROCHLORIDE AND ACETAMINOPHEN 1 TABLET: 5; 325 TABLET ORAL at 10:09

## 2018-11-06 NOTE — ED PROVIDER NOTES
EMERGENCY DEPARTMENT HISTORY AND PHYSICAL EXAM    9:37 AM      Date: 11/6/2018  Patient Name: Nahed Ferrell    History of Presenting Illness     Chief Complaint   Patient presents with    Abscess         History Provided By: Patient    Chief Complaint: Abscess   Duration:  Sunday  Timing:  Acute  Location: Left axilla   Quality: Not obtained   Severity: Moderate  Modifying Factors: He says that pain medications are not helping his pain   Associated Symptoms: Chills      Additional History (Context): 9:40 AM Nahed Ferrell is a 59 y.o. male with h/o skin cancer, is a current smoker and has no other pertinent surgical history who presents to ED complaining of an acute moderate left axilla abscess onset Sunday. Patient was previously in the hospital for an abscess on his knee that he received from getting cut from a pascual nail while working on a house. He had similar symptoms then that he has now. Patient says that he saw four to five little bumps around his armpit and he thought originally that they were hair follicles. He has been in pain and has been taking Ibuprofen but he says that he hasn't helped. He woke up with chills this morning and so he took some BC powder and came to the ED. He says that he changed his dressing because he was saturated. No other concerns or symptoms at this time. PCP: Liz Mckinney MD    Current Facility-Administered Medications   Medication Dose Route Frequency Provider Last Rate Last Dose    vancomycin (VANCOCIN) 2000 mg in  ml infusion  2,000 mg IntraVENous Radha Penn  mL/hr at 11/06/18 1010 2,000 mg at 11/06/18 1010    cefTRIAXone (ROCEPHIN) 1 g in 0.9% sodium chloride (MBP/ADV) 50 mL MBP  1 g IntraVENous Radha Penn MD   Stopped at 11/06/18 1031     Current Outpatient Medications   Medication Sig Dispense Refill    clindamycin (CLEOCIN HCL) 150 mg capsule Take 2 Caps by mouth four (4) times daily for 7 days.  56 Cap 0    traMADol (ULTRAM) 50 mg tablet Take 1 Tab by mouth every six (6) hours as needed for Pain. Max Daily Amount: 200 mg. 6 Tab 0    cephALEXin (KEFLEX) 500 mg capsule Take 1 Cap by mouth four (4) times daily for 10 days. 40 Cap 0    trimethoprim-sulfamethoxazole (BACTRIM DS) 160-800 mg per tablet Take 2 Tabs by mouth two (2) times a day for 10 days. 40 Tab 0       Past History     Past Medical History:  Past Medical History:   Diagnosis Date    Skin cancer        Past Surgical History:  History reviewed. No pertinent surgical history. Family History:  History reviewed. No pertinent family history. Social History:  Social History     Tobacco Use    Smoking status: Current Some Day Smoker    Smokeless tobacco: Current User   Substance Use Topics    Alcohol use: Not on file    Drug use: Not on file       Allergies: Allergies   Allergen Reactions    Amoxicillin Hives    Other Medication Other (comments)     No narcotics/hx addiction    Penicillins Hives         Review of Systems     Review of Systems   Constitutional: Positive for chills. Respiratory: Negative for cough. Cardiovascular: Negative for chest pain. Gastrointestinal: Negative for abdominal pain. Musculoskeletal:        Axillary pain     Skin: Positive for wound (abscess in left axilla ). All other systems reviewed and are negative. Physical Exam     Visit Vitals  BP (!) 147/98 (BP 1 Location: Right arm, BP Patient Position: At rest)   Pulse 70   Temp 98 °F (36.7 °C)   Resp 16   Ht 5' 9\" (1.753 m)   Wt 92.1 kg (203 lb)   SpO2 98%   BMI 29.98 kg/m²       Physical Exam   Constitutional: He is oriented to person, place, and time. He appears well-developed and well-nourished. No distress. HENT:   Head: Normocephalic and atraumatic. Mouth/Throat: Oropharynx is clear and moist.   Eyes: Conjunctivae and EOM are normal. Pupils are equal, round, and reactive to light. No scleral icterus. Neck: Normal range of motion. Neck supple. Cardiovascular: Normal rate, regular rhythm and normal heart sounds. No murmur heard. Pulmonary/Chest: Effort normal and breath sounds normal. No respiratory distress. Abdominal: Soft. Bowel sounds are normal. He exhibits no distension. There is no tenderness. Musculoskeletal: He exhibits no edema. Lymphadenopathy:     He has no cervical adenopathy. Neurological: He is alert and oriented to person, place, and time. Coordination normal.   Skin: Skin is warm and dry. No rash noted. There is erythema (Mild). Induration along pectoralis muscle with possible abscess in axilla. Wound itself looks good, minimal drainage, mild erythema. 8cm induration    Psychiatric: He has a normal mood and affect. His behavior is normal.   Nursing note and vitals reviewed. Diagnostic Study Results     Labs -  Recent Results (from the past 12 hour(s))   CBC WITH AUTOMATED DIFF    Collection Time: 11/06/18  9:54 AM   Result Value Ref Range    WBC 9.3 4.6 - 13.2 K/uL    RBC 4.34 (L) 4.70 - 5.50 M/uL    HGB 13.2 13.0 - 16.0 g/dL    HCT 40.5 36.0 - 48.0 %    MCV 93.3 74.0 - 97.0 FL    MCH 30.4 24.0 - 34.0 PG    MCHC 32.6 31.0 - 37.0 g/dL    RDW 13.2 11.6 - 14.5 %    PLATELET 543 681 - 158 K/uL    MPV 8.9 (L) 9.2 - 11.8 FL    NEUTROPHILS 75 (H) 40 - 73 %    LYMPHOCYTES 15 (L) 21 - 52 %    MONOCYTES 9 3 - 10 %    EOSINOPHILS 1 0 - 5 %    BASOPHILS 0 0 - 2 %    ABS. NEUTROPHILS 6.9 1.8 - 8.0 K/UL    ABS. LYMPHOCYTES 1.4 0.9 - 3.6 K/UL    ABS. MONOCYTES 0.9 0.05 - 1.2 K/UL    ABS. EOSINOPHILS 0.1 0.0 - 0.4 K/UL    ABS.  BASOPHILS 0.0 0.0 - 0.1 K/UL    DF AUTOMATED     METABOLIC PANEL, COMPREHENSIVE    Collection Time: 11/06/18  9:54 AM   Result Value Ref Range    Sodium 140 136 - 145 mmol/L    Potassium 3.8 3.5 - 5.5 mmol/L    Chloride 109 (H) 100 - 108 mmol/L    CO2 26 21 - 32 mmol/L    Anion gap 5 3.0 - 18 mmol/L    Glucose 98 74 - 99 mg/dL    BUN 10 7.0 - 18 MG/DL    Creatinine 0.85 0.6 - 1.3 MG/DL    BUN/Creatinine ratio 12 12 - 20      GFR est AA >60 >60 ml/min/1.73m2    GFR est non-AA >60 >60 ml/min/1.73m2    Calcium 8.5 8.5 - 10.1 MG/DL    Bilirubin, total 0.3 0.2 - 1.0 MG/DL    ALT (SGPT) 21 16 - 61 U/L    AST (SGOT) 22 15 - 37 U/L    Alk. phosphatase 102 45 - 117 U/L    Protein, total 7.2 6.4 - 8.2 g/dL    Albumin 3.2 (L) 3.4 - 5.0 g/dL    Globulin 4.0 2.0 - 4.0 g/dL    A-G Ratio 0.8 0.8 - 1.7     POC LACTIC ACID    Collection Time: 11/06/18  9:57 AM   Result Value Ref Range    Lactic Acid (POC) 0.37 (L) 0.40 - 2.00 mmol/L       Radiologic Studies -   CT SHOULDER LT W CONT   Final Result            Medical Decision Making   I am the first provider for this patient. I reviewed the vital signs, available nursing notes, past medical history, past surgical history, family history and social history. Vital Signs-Reviewed the patient's vital signs. Pulse Oximetry Analysis -  98% on room air WNL    Cardiac Monitor:  Rate: 70 bpm  Records Reviewed: Nursing Notes and Old Medical Records (Time of Review: 9:37 AM)    ED Course: Progress Notes, Reevaluation, and Consults:  Stable in ED vanc and rocephin started ct reviewed no evidence of abscess at present     Provider Notes (Medical Decision Making):   MDM  Number of Diagnoses or Management Options  Diagnosis management comments: hydratinitis suppuritiva with ? Extension to pectoralis muscle        Amount and/or Complexity of Data Reviewed  Clinical lab tests: ordered  Tests in the radiology section of CPT®: ordered          Diagnosis     Clinical Impression:   1.  Hidradenitis suppurativa of left axilla        Disposition: home     Follow-up Information     Follow up With Specialties Details Why [facility] EMERGENCY DEPT Emergency Medicine  As needed, If symptoms worsen [address]    Susannah Potter MD Family Practice Schedule an appointment as soon as possible for a visit  [address] [facility]      Mariana Wagner MD General Surgery Schedule an appointment as soon as possible for a visit for ED follow up appointment  [address] [address] [address] [phone]                Medication List      START taking these medications    clindamycin 150 mg capsule  Commonly known as:  CLEOCIN HCL  Take 2 Caps by mouth four (4) times daily for 7 days. traMADol 50 mg tablet  Commonly known as:  ULTRAM  Take 1 Tab by mouth every six (6) hours as needed for Pain. Max Daily Amount: 200 mg. ASK your doctor about these medications    cephALEXin 500 mg capsule  Commonly known as:  KEFLEX  Take 1 Cap by mouth four (4) times daily for 10 days. trimethoprim-sulfamethoxazole 160-800 mg per tablet  Commonly known as:  BACTRIM DS  Take 2 Tabs by mouth two (2) times a day for 10 days. Where to Get Your Medications      Information about where to get these medications is not yet available    Ask your nurse or doctor about these medications  · clindamycin 150 mg capsule  · traMADol 50 mg tablet       _______________________________    Attestations:  Scribe Attestation     Kisha Escamilla acting as a scribe for and in the presence of Sherie Guevara MD      November 06, 2018 at 9:37 AM       Provider Attestation:      I personally performed the services described in the documentation, reviewed the documentation, as recorded by the scribe in my presence, and it accurately and completely records my words and actions.  November 06, 2018 at 9:37 AM - Sherie Geuvara MD    _______________________________

## 2018-11-06 NOTE — DISCHARGE INSTRUCTIONS
Hidradenitis Suppurativa: Care Instructions  Your Care Instructions    Hidradenitis suppurativa (say \"sua-rrsk-yk-NY-tus sup-kenny-uh-TY-vuh\") is a skin condition that causes lumps on the skin that look like pimples or boils. The lumps are usually painful and can break open and drain blood and bad-smelling pus. The condition can come and go for many years. Treatment for this condition may include antibiotics and other medicines. You may need surgery to remove the lumps. Home care includes wearing loose-fitting clothes and washing the area gently. You can help prevent lumps from coming back by staying at a healthy weight and not smoking. Doctors don't know exactly how this condition starts. But they do know that something irritates and inflames the hair follicles, causing them to swell and form lumps. This skin condition can't be spread from person to person (isn't contagious). Follow-up care is a key part of your treatment and safety. Be sure to make and go to all appointments, and call your doctor if you are having problems. It's also a good idea to know your test results and keep a list of the medicines you take. How can you care for yourself at home?  [facility] care    · Wash the area every day with mild soap. Use your hands rather than a washcloth or sponge when you wash that part of your body.     · Leave the affected areas uncovered when you can. If you have lumps that are draining, you can cover them with a bandage or other dressing. Put petroleum jelly (such as Vaseline) on the dressing to help keep it from sticking.     · Wear-loose fitting clothes that don't rub against the area. Avoid activities that cause skin to rub together.     · If you have pain, try a warm compress. Soak a towel or washcloth in warm water, wring it out, and place it on the affected skin for about 10 minutes. Medicines    · Be safe with medicines. Take your medicines exactly as prescribed.  Call your doctor if you think you are having a problem with your medicine. You will get more details on the specific medicines your doctor prescribes.     · If your doctor prescribed antibiotics, take them as directed. Do not stop taking them just because you feel better. You need to take the full course of antibiotics.    Lifestyle choices    · If you smoke, think about quitting. Smoking can make the condition worse. If you need help quitting, talk to your doctor about stop-smoking programs and medicines. These can increase your chances of quitting for good.     · Stay at a healthy weight, or lose weight, by eating healthy foods and being physically active. Being overweight could make this condition worse. When should you call for help? Call your doctor now or seek immediate medical care if:    · You have symptoms of infection, such as:  ? Increased pain, swelling, warmth, or redness. ? Red streaks leading from the area. ? Pus draining from the area. ? A fever.    Watch closely for changes in your health, and be sure to contact your doctor if:    · You do not get better as expected. Where can you learn more? Go to http://tiffanie-elliot.info/. Enter F375 in the search box to learn more about \"Hidradenitis Suppurativa: Care Instructions. \"  Current as of: April 18, 2018  Content Version: 11.8  © 0410-5841 [employer], [employer]. Care instructions adapted under license by [employer] (which disclaims liability or warranty for this information). If you have questions about a medical condition or this instruction, always ask your healthcare professional. [place] [address] any warranty or liability for your use of this information.

## 2018-11-07 LAB
BACTERIA SPEC CULT: ABNORMAL
BACTERIA SPEC CULT: ABNORMAL
GRAM STN SPEC: ABNORMAL
SERVICE CMNT-IMP: ABNORMAL

## 2018-11-10 LAB
BACTERIA SPEC CULT: NORMAL
SERVICE CMNT-IMP: NORMAL

## 2018-11-12 LAB
BACTERIA SPEC CULT: NORMAL
BACTERIA SPEC CULT: NORMAL
SERVICE CMNT-IMP: NORMAL
SERVICE CMNT-IMP: NORMAL

## 2019-03-28 ENCOUNTER — HOSPITAL ENCOUNTER (EMERGENCY)
Age: 64
Discharge: HOME | End: 2019-03-28
Attending: EMERGENCY MEDICINE
Payer: SELF-PAY

## 2019-03-28 ENCOUNTER — APPOINTMENT (OUTPATIENT)
Dept: GENERAL RADIOLOGY | Age: 64
End: 2019-03-28
Attending: PHYSICIAN ASSISTANT
Payer: SELF-PAY

## 2019-03-28 VITALS
WEIGHT: 205 LBS | SYSTOLIC BLOOD PRESSURE: 149 MMHG | TEMPERATURE: 98 F | DIASTOLIC BLOOD PRESSURE: 97 MMHG | OXYGEN SATURATION: 96 % | RESPIRATION RATE: 16 BRPM | HEART RATE: 68 BPM | BODY MASS INDEX: 31.07 KG/M2 | HEIGHT: 68 IN

## 2019-03-28 DIAGNOSIS — M70.51 PATELLAR BURSITIS OF RIGHT KNEE: Primary | ICD-10-CM

## 2019-03-28 DIAGNOSIS — R03.0 ELEVATED BLOOD PRESSURE READING: ICD-10-CM

## 2019-03-28 DIAGNOSIS — M25.561 ACUTE PAIN OF RIGHT KNEE: ICD-10-CM

## 2019-03-28 LAB
ALBUMIN SERPL-MCNC: 3.2 G/DL (ref 3.4–5)
ALBUMIN/GLOB SERPL: 0.9 {RATIO} (ref 0.8–1.7)
ALP SERPL-CCNC: 86 U/L (ref 45–117)
ALT SERPL-CCNC: 28 U/L (ref 16–61)
ANION GAP SERPL CALC-SCNC: 9 MMOL/L (ref 3–18)
AST SERPL-CCNC: 25 U/L (ref 15–37)
BASOPHILS # BLD: 0 K/UL (ref 0–0.1)
BASOPHILS NFR BLD: 0 % (ref 0–2)
BILIRUB SERPL-MCNC: 0.1 MG/DL (ref 0.2–1)
BUN SERPL-MCNC: 21 MG/DL (ref 7–18)
BUN/CREAT SERPL: 19 (ref 12–20)
CALCIUM SERPL-MCNC: 7.9 MG/DL (ref 8.5–10.1)
CHLORIDE SERPL-SCNC: 112 MMOL/L (ref 100–108)
CO2 SERPL-SCNC: 23 MMOL/L (ref 21–32)
CREAT SERPL-MCNC: 1.09 MG/DL (ref 0.6–1.3)
DIFFERENTIAL METHOD BLD: ABNORMAL
EOSINOPHIL # BLD: 0.3 K/UL (ref 0–0.4)
EOSINOPHIL NFR BLD: 5 % (ref 0–5)
ERYTHROCYTE [DISTWIDTH] IN BLOOD BY AUTOMATED COUNT: 14.2 % (ref 11.6–14.5)
GLOBULIN SER CALC-MCNC: 3.4 G/DL (ref 2–4)
GLUCOSE SERPL-MCNC: 112 MG/DL (ref 74–99)
HCT VFR BLD AUTO: 42.6 % (ref 36–48)
HGB BLD-MCNC: 14.2 G/DL (ref 13–16)
LYMPHOCYTES # BLD: 2.1 K/UL (ref 0.9–3.6)
LYMPHOCYTES NFR BLD: 34 % (ref 21–52)
MCH RBC QN AUTO: 30.7 PG (ref 24–34)
MCHC RBC AUTO-ENTMCNC: 33.3 G/DL (ref 31–37)
MCV RBC AUTO: 92.2 FL (ref 74–97)
MONOCYTES # BLD: 0.6 K/UL (ref 0.05–1.2)
MONOCYTES NFR BLD: 9 % (ref 3–10)
NEUTS SEG # BLD: 3.1 K/UL (ref 1.8–8)
NEUTS SEG NFR BLD: 52 % (ref 40–73)
PLATELET # BLD AUTO: 271 K/UL (ref 135–420)
PMV BLD AUTO: 9.1 FL (ref 9.2–11.8)
POTASSIUM SERPL-SCNC: 4.2 MMOL/L (ref 3.5–5.5)
PROT SERPL-MCNC: 6.6 G/DL (ref 6.4–8.2)
RBC # BLD AUTO: 4.62 M/UL (ref 4.7–5.5)
SODIUM SERPL-SCNC: 144 MMOL/L (ref 136–145)
WBC # BLD AUTO: 6 K/UL (ref 4.6–13.2)

## 2019-03-28 PROCEDURE — 99283 EMERGENCY DEPT VISIT LOW MDM: CPT

## 2019-03-28 PROCEDURE — 80053 COMPREHEN METABOLIC PANEL: CPT

## 2019-03-28 PROCEDURE — 85025 COMPLETE CBC W/AUTO DIFF WBC: CPT

## 2019-03-28 PROCEDURE — 96374 THER/PROPH/DIAG INJ IV PUSH: CPT

## 2019-03-28 PROCEDURE — 74011250637 HC RX REV CODE- 250/637: Performed by: NURSE PRACTITIONER

## 2019-03-28 PROCEDURE — 96375 TX/PRO/DX INJ NEW DRUG ADDON: CPT

## 2019-03-28 PROCEDURE — 73564 X-RAY EXAM KNEE 4 OR MORE: CPT

## 2019-03-28 PROCEDURE — 74011250636 HC RX REV CODE- 250/636: Performed by: NURSE PRACTITIONER

## 2019-03-28 RX ORDER — KETOROLAC TROMETHAMINE 30 MG/ML
30 INJECTION, SOLUTION INTRAMUSCULAR; INTRAVENOUS
Status: COMPLETED | OUTPATIENT
Start: 2019-03-28 | End: 2019-03-28

## 2019-03-28 RX ORDER — CEPHALEXIN 500 MG/1
CAPSULE ORAL
Qty: 28 CAP | Refills: 0 | Status: SHIPPED | OUTPATIENT
Start: 2019-03-28 | End: 2020-02-28

## 2019-03-28 RX ORDER — HYDROCODONE BITARTRATE AND ACETAMINOPHEN 5; 325 MG/1; MG/1
1 TABLET ORAL
Qty: 10 TAB | Refills: 0 | Status: SHIPPED | OUTPATIENT
Start: 2019-03-28 | End: 2019-03-31

## 2019-03-28 RX ORDER — IBUPROFEN 800 MG/1
800 TABLET ORAL
Qty: 20 TAB | Refills: 0 | Status: SHIPPED | OUTPATIENT
Start: 2019-03-28 | End: 2019-04-04

## 2019-03-28 RX ORDER — MORPHINE SULFATE 2 MG/ML
6 INJECTION, SOLUTION INTRAMUSCULAR; INTRAVENOUS
Status: COMPLETED | OUTPATIENT
Start: 2019-03-28 | End: 2019-03-28

## 2019-03-28 RX ORDER — CEPHALEXIN 250 MG/1
500 CAPSULE ORAL
Status: COMPLETED | OUTPATIENT
Start: 2019-03-28 | End: 2019-03-28

## 2019-03-28 RX ORDER — ASPIRIN 325 MG
325 TABLET ORAL DAILY
COMMUNITY
End: 2020-02-28

## 2019-03-28 RX ADMIN — MORPHINE SULFATE 6 MG: 2 INJECTION, SOLUTION INTRAMUSCULAR; INTRAVENOUS at 15:37

## 2019-03-28 RX ADMIN — KETOROLAC TROMETHAMINE 30 MG: 30 INJECTION, SOLUTION INTRAMUSCULAR; INTRAVENOUS at 16:31

## 2019-03-28 RX ADMIN — CEPHALEXIN 500 MG: 250 CAPSULE ORAL at 16:31

## 2019-03-28 NOTE — ED PROVIDER NOTES
Luis Angel Reardon is a 59year old male who presents to he ED with a c/o right knee pain and swelling. Pt denies trauma. States that he had a knee infection last year and had to spend time in the hospital getting IV antibiotics. Past Medical History:   Diagnosis Date    Skin cancer        History reviewed. No pertinent surgical history. History reviewed. No pertinent family history. Social History     Socioeconomic History    Marital status: SINGLE     Spouse name: Not on file    Number of children: Not on file    Years of education: Not on file    Highest education level: Not on file   Occupational History    Not on file   Social Needs    Financial resource strain: Not on file    Food insecurity:     Worry: Not on file     Inability: Not on file    Transportation needs:     Medical: Not on file     Non-medical: Not on file   Tobacco Use    Smoking status: Current Some Day Smoker    Smokeless tobacco: Current User   Substance and Sexual Activity    Alcohol use: Not on file    Drug use: Not on file    Sexual activity: Not on file   Lifestyle    Physical activity:     Days per week: Not on file     Minutes per session: Not on file    Stress: Not on file   Relationships    Social connections:     Talks on phone: Not on file     Gets together: Not on file     Attends Yazidism service: Not on file     Active member of club or organization: Not on file     Attends meetings of clubs or organizations: Not on file     Relationship status: Not on file    Intimate partner violence:     Fear of current or ex partner: Not on file     Emotionally abused: Not on file     Physically abused: Not on file     Forced sexual activity: Not on file   Other Topics Concern    Not on file   Social History Narrative    Not on file         ALLERGIES: Amoxicillin; Other medication; and Penicillins    Review of Systems   Constitutional: Negative. HENT: Negative. Eyes: Negative.     Respiratory: Negative. Cardiovascular: Negative. Gastrointestinal: Negative. Endocrine: Negative. Genitourinary: Negative. Musculoskeletal: Positive for arthralgias (right knee) and joint swelling (right knee). Skin: Negative. Allergic/Immunologic: Negative. Neurological: Negative. Hematological: Negative. Psychiatric/Behavioral: Negative. Vitals:    03/28/19 1354   BP: (!) 149/97   Pulse: 68   Resp: 16   Temp: 98 °F (36.7 °C)   SpO2: 96%   Weight: 93 kg (205 lb)   Height: 5' 8\" (1.727 m)            Physical Exam   Constitutional: He appears well-developed and well-nourished. No distress. HENT:   Head: Normocephalic and atraumatic. Nose: Nose normal.   Eyes: EOM are normal. Right eye exhibits no discharge. Left eye exhibits no discharge. No scleral icterus. Neck: Normal range of motion. Neck supple. No tracheal deviation present. Cardiovascular: Normal rate, regular rhythm and intact distal pulses. Pulmonary/Chest: Effort normal and breath sounds normal.   Abdominal: Soft. He exhibits no distension. Genitourinary:   Genitourinary Comments: NE     Musculoskeletal: He exhibits no deformity. Right knee is slightly warm to touch when compared to the opposing joint. Slight anterior joint line edema. No erythema. Distal pulses intact. Full ROM of the right knee in flexion and extension. Anterior / Posterior Drawers - neg  Baljinder's - neg  Varus / Valgus - neg     Neurological: He is alert. Coordination normal.   Skin: Skin is warm and dry. NE.   Psychiatric: He has a normal mood and affect. His behavior is normal.   Nursing note and vitals reviewed. MDM  Number of Diagnoses or Management Options  Acute pain of right knee:   Elevated blood pressure reading:   Patellar bursitis of right knee:   Diagnosis management comments: MDM:  Pt has a very slightly warm knee without other adverse findings. There is no elevation of serum WBC, and no fever. Full ROM.   Do not believe there is a joint space involvement. There fore, do not feel joint aspiration warranted. This is likely an inflammatory process and will provide NSAIDS to cover. As the Pt has had this joint infected in the past, will provide Keflex to cover prophylactically. Mich Ramirez NP    4:10 PM    PROGRESS NOTE:  One or more blood pressure readings were noted elevated during the Pt's presentation in the emergency department this date. This abnormal reading has been cited in the Pt's diagnosis, and they have been encouraged to follow up with their primary care physician, or referred to a consultant for further evaluation and treatment. Mich Ramirez NP   4:16 PM             Amount and/or Complexity of Data Reviewed  Clinical lab tests: ordered and reviewed  Tests in the radiology section of CPT®: ordered and reviewed  Independent visualization of images, tracings, or specimens: yes (PLAIN FILM xr  )    Risk of Complications, Morbidity, and/or Mortality  Presenting problems: low  Diagnostic procedures: low  Management options: low           Procedures    Diagnosis:   1. Patellar bursitis of right knee    2. Acute pain of right knee    3. Elevated blood pressure reading          Disposition:   DISCHARGED TO HOME    Follow-up Information     Follow up With Specialties Details Why Contact Info    Jo Ann Rojas MD Orthopedic Surgery Call in the morning to arrange follow up    [address] [place] [address]  [phone]            Patient's Medications   Start Taking    CEPHALEXIN (KEFLEX) 500 MG CAPSULE    Take two caps po in the morning, and two caps po in the evening for ten days. HYDROCODONE-ACETAMINOPHEN (NORCO) 5-325 MG PER TABLET    Take 1 Tab by mouth every four (4) hours as needed for Pain for up to 10 doses. Max Daily Amount: 6 Tabs. IBUPROFEN (MOTRIN) 800 MG TABLET    Take 1 Tab by mouth every eight (8) hours as needed for Pain for up to 7 days.    Continue Taking    ASPIRIN (ASPIRIN) 325 MG TABLET    Take 325 mg by mouth daily. These Medications have changed    No medications on file   Stop Taking    TRAMADOL (ULTRAM) 50 MG TABLET    Take 1 Tab by mouth every six (6) hours as needed for Pain. Max Daily Amount: 200 mg.

## 2019-03-28 NOTE — ED TRIAGE NOTES
Right knee pain x 3 weeks. Hx infection in knee in past needing iv antibiotics. Feels tight and swollen. New job x 2 weeks.

## 2019-03-28 NOTE — DISCHARGE INSTRUCTIONS
[employer] Activation    Thank you for requesting access to [employer]. Please follow the instructions below to securely access and download your online medical record. [employer] allows you to send messages to your doctor, view your test results, renew your prescriptions, schedule appointments, and more. How Do I Sign Up? 1. In your internet browser, go to [email or web address]  2. Click on the First Time User? Click Here link in the Sign In box. You will be redirect to the New Member Sign Up page. 3. Enter your [employer] Access Code exactly as it appears below. You will not need to use this code after youve completed the sign-up process. If you do not sign up before the expiration date, you must request a new code. [employer] Access Code: [email or web address]  Expires: 2019  1:50 PM (This is the date your [employer] access code will )    4. Enter the last four digits of your Social Security Number (xxxx) and Date of Birth (mm/dd/yyyy) as indicated and click Submit. You will be taken to the next sign-up page. 5. Create a [employer] ID. This will be your [employer] login ID and cannot be changed, so think of one that is secure and easy to remember. 6. Create a [employer] password. You can change your password at any time. 7. Enter your Password Reset Question and Answer. This can be used at a later time if you forget your password. 8. Enter your e-mail address. You will receive e-mail notification when new information is available in [address]. 9. Click Sign Up. You can now view and download portions of your medical record. 10. Click the Download Summary menu link to download a portable copy of your medical information. Additional Information    If you have questions, please visit the Frequently Asked Questions section of the [employer] website at [email or web address] at [email or web address] Remember, [employer] is NOT to be used for urgent needs. For medical emergencies, dial 911.

## 2019-03-28 NOTE — ED NOTES
I performed a brief evaluation, including history and physical, of the patient here in triage and I have determined that pt will need further treatment and evaluation from the main side ER physician. I have placed initial orders to help in expediting patients care. March 28, 2019 at 2:06 PM - Raymundo Pederson PA-C Visit Vitals BP (!) 149/97 (BP 1 Location: Right arm, BP Patient Position: At rest) Pulse 68 Temp 98 °F (36.7 °C) Resp 16 Ht 5' 8\" (1.727 m) Wt 93 kg (205 lb) SpO2 96% BMI 31.17 kg/m²

## 2020-02-28 ENCOUNTER — APPOINTMENT (OUTPATIENT)
Dept: GENERAL RADIOLOGY | Age: 65
End: 2020-02-28
Attending: PHYSICIAN ASSISTANT
Payer: MEDICAID

## 2020-02-28 ENCOUNTER — HOSPITAL ENCOUNTER (EMERGENCY)
Age: 65
Discharge: HOME | End: 2020-02-28
Attending: EMERGENCY MEDICINE
Payer: MEDICAID

## 2020-02-28 VITALS
HEART RATE: 96 BPM | OXYGEN SATURATION: 100 % | TEMPERATURE: 97.8 F | HEIGHT: 68 IN | DIASTOLIC BLOOD PRESSURE: 94 MMHG | BODY MASS INDEX: 37.13 KG/M2 | SYSTOLIC BLOOD PRESSURE: 138 MMHG | RESPIRATION RATE: 18 BRPM | WEIGHT: 245 LBS

## 2020-02-28 DIAGNOSIS — R05.9 COUGH: ICD-10-CM

## 2020-02-28 DIAGNOSIS — R07.81 RIB PAIN ON RIGHT SIDE: Primary | ICD-10-CM

## 2020-02-28 PROCEDURE — 71046 X-RAY EXAM CHEST 2 VIEWS: CPT

## 2020-02-28 PROCEDURE — 74011250636 HC RX REV CODE- 250/636: Performed by: PHYSICIAN ASSISTANT

## 2020-02-28 PROCEDURE — 96374 THER/PROPH/DIAG INJ IV PUSH: CPT

## 2020-02-28 PROCEDURE — 99283 EMERGENCY DEPT VISIT LOW MDM: CPT

## 2020-02-28 PROCEDURE — 74011250637 HC RX REV CODE- 250/637: Performed by: PHYSICIAN ASSISTANT

## 2020-02-28 RX ORDER — OMEPRAZOLE 20 MG/1
20 TABLET, DELAYED RELEASE ORAL DAILY
COMMUNITY

## 2020-02-28 RX ORDER — KETOROLAC TROMETHAMINE 10 MG/1
10 TABLET, FILM COATED ORAL
Qty: 15 TAB | Refills: 0 | Status: SHIPPED | OUTPATIENT
Start: 2020-02-28 | End: 2022-09-07

## 2020-02-28 RX ORDER — KETOROLAC TROMETHAMINE 30 MG/ML
30 INJECTION, SOLUTION INTRAMUSCULAR; INTRAVENOUS
Status: COMPLETED | OUTPATIENT
Start: 2020-02-28 | End: 2020-02-28

## 2020-02-28 RX ORDER — CYCLOBENZAPRINE HCL 10 MG
10 TABLET ORAL
Qty: 20 TAB | Refills: 0 | Status: SHIPPED | OUTPATIENT
Start: 2020-02-28 | End: 2022-09-07

## 2020-02-28 RX ORDER — CYCLOBENZAPRINE HCL 10 MG
10 TABLET ORAL
Status: COMPLETED | OUTPATIENT
Start: 2020-02-28 | End: 2020-02-28

## 2020-02-28 RX ADMIN — KETOROLAC TROMETHAMINE 30 MG: 30 INJECTION, SOLUTION INTRAMUSCULAR at 22:10

## 2020-02-28 RX ADMIN — CYCLOBENZAPRINE 10 MG: 10 TABLET, FILM COATED ORAL at 22:09

## 2020-02-29 NOTE — ED NOTES
I have reviewed discharge instructions with the patient. The patient verbalized understanding.  Pt instructed on incentive spirometer use and demonstrated learning

## 2020-02-29 NOTE — ED TRIAGE NOTES
Pt ambulatory to triage c/o cough x 1.5 wks. States 'had the flu a month ago with a cough for like 3 wks' states he has slowed smoking and thinks much of that is r/t this. To ED c/o having a bad coughing fit 2 days ago, and feels like 'something popped' in right ribs with extreme pain. Tried BC powder and motrin for pain with no relief.

## 2020-11-06 NOTE — PROGRESS NOTES
1. MAGNO for evaluation of MitraClip  -No absolute contraindications of esophageal stricture, tumor, perforation, laceration,or diverticulum and/or active GI bleed  -The risks, benefits & alternatives of the procedure were explained to the patient.   -The risks of transesophageal echo include but are not limited to:  Dental trauma, esophageal trauma/perforation, bleeding, laryngospasm/brochospasm, aspiration, sore throat/hoarseness, & dislodgement of the endotracheal tube/nasogastric tube (where applicable).    -The risks of moderate sedation include hypotension, respiratory depression, arrhythmias, bronchospasm, & death.    -Informed consent was obtained. The patient is agreeable to proceed with the procedure and all questions and concerns addressed.    Case discussed with an attending in echocardiography lab.     Further recommendations per attending addendum     INFECTIOUS DISEASE FOLLOW UP NOTE :-     Admit Date: 7/29/2018    ABX;      Current  Prior    Ancef 8/2 - 0 Levaquin 7.29-2, Vanco 7/29-4, Meropenem 7/31-2     ASSESSMENT: -> RECS     Right leg wound with cellulitis MSSA with prepatellar purulent bursitis- started with puncture nail wound over a week ago  - suspect nail was old and pascual and infected  - pt s/p Tetanus shot in ED 7/27  - wound cx - MSSA, Blcx ntd  - MRI knee neg for septic arthritis, PVLs neg  - s/p I and D of prepatellar bursa and fascia 7/31 by Dr Chacha Marx   - ESR/CRP 96/12 - pt s/p surgery and drainage of abscess   - final OR cx again with MSSA   - since Vancomycin not in therapeutic range for last 3 days and its not mrsa, will switch abx to Iv Ancef [ has tolerated keflex as outpt ]  - if cont to improve on ancef then will switch to po keflex on discharge however he may need additional 2-3 days on iv abx. - d/w Medicine team   New fever 7/30- suspect from un drained abscess  - blcx neg, leukocytosis resolved  - fever resolved  - fever resolved    H/o basal cell ca     Smoker - Counseled on cessation   Ax- PCN- >30 years ago - tolerated keflex as out pt, tolerated meropenem     MICROBIOLOGY:   7/29               Wd cx - MSSA                         Blcx x1 neg  7/30               Blcx x2 ntd                         Ucx neg  7/31               Wound cx - MSSA                        Anaerobic cx ntd                         Tissue cx - MSSA     LINES AND CATHETERS:   PIV    SUBJECTIVE :     Interval notes reviewed. Pt awake, alert today, says his leg is feeling still very tight around knee with swelling and redness of back of thigh and front of rt leg. No fever or chills.      MEDICATIONS :     Current Facility-Administered Medications   Medication Dose Route Frequency    ceFAZolin (ANCEF) 2g IVPB in 50 mL D5W  2 g IntraVENous Q8H    morphine injection 2 mg  2 mg IntraVENous Q3H PRN    oxyCODONE-acetaminophen (PERCOCET 10)  mg per tablet 2 Tab  2 Tab Oral Q6H PRN    sodium chloride (NS) flush 5-10 mL  5-10 mL IntraVENous Q8H    sodium chloride (NS) flush 5-10 mL  5-10 mL IntraVENous PRN    enoxaparin (LOVENOX) injection 40 mg  40 mg SubCUTAneous Q24H       OBJECTIVE :     Visit Vitals    /81 (BP 1 Location: Left arm, BP Patient Position: At rest)    Pulse (!) 57    Temp 97.8 °F (36.6 °C)    Resp 18    Ht 5' 9\" (1.753 m)    Wt 81.2 kg (179 lb)    SpO2 96%    BMI 26.43 kg/m2       Temp (24hrs), Av.8 °F (36.6 °C), Min:97.7 °F (36.5 °C), Max:98.1 °F (36.7 °C)    General: Well-developed, 65y.o. year-old, male, in no acute distress  HEENT: Normocephalic, anicteric sclerae, Pupils equal, round reactive to light, no oropharyngeal lesions. Neck: Supple, no lymphadenopathy, masses or thyromegaly  Chest: Symmetrical expansion  Lungs: Clear to auscultation bilaterally, no dullness  Heart: Regular rhythm, no murmurs, rubs or gallops, No JVD  Abdomen: Soft, non-tender,non distended, no organomegaly, BS+  Musculoskeletal: Rt knee area with wound vac taken out, surrounding area of knee swollen, erythema back of knee and also over rt leg with warmth present. CNS: AAOx3. Cranial nerves II-XII intact. Grossly normal.No NR    Labs: Results:   Chemistry Recent Labs      18   0400  18   0542   GLU  95  118*   NA  140  141   K  5.0  3.7   CL  105  106   CO2  28  25   BUN  16  9   CREA  0.70  1.00   CA  8.3*  8.2*   AGAP  7  10   BUCR  23*  9*      CBC w/Diff Recent Labs      18   0400  18   0542   WBC  7.1  7.0   RBC  3.62*  3.67*   HGB  10.9*  11.3*   HCT  33.6*  33.6*   PLT  276  220   GRANS  57  68   LYMPH  30  18*   EOS  5  1          RADIOLOGY :      MRI KNEE  - IMPRESSION:     1.  Soft tissue puncture wound to the anterior pretibial soft tissues at the  level of the tibial tuberosity with adjacent findings of cellulitis and  ill-defined subcutaneous phlegmon within the prepatellar soft tissues.     2. Mild reactive myositis involving the proximal anterior fibers of the tibialis  anterior muscle belly. No rim-enhancing intramuscular fluid collection.     3. No evidence of significant joint effusion or synovitis. Physiologic amount of  fluid within the knee joint.     4. No evidence of bone marrow signal abnormality to suggest osteomyelitis.       Abimael Borrego MD  August 2, 2018  [facility]  [phone]

## 2020-12-14 ENCOUNTER — HOSPITAL ENCOUNTER (EMERGENCY)
Age: 65
Discharge: HOME | End: 2020-12-14
Attending: EMERGENCY MEDICINE
Payer: MEDICAID

## 2020-12-14 VITALS
RESPIRATION RATE: 18 BRPM | OXYGEN SATURATION: 98 % | SYSTOLIC BLOOD PRESSURE: 160 MMHG | WEIGHT: 240 LBS | HEART RATE: 72 BPM | BODY MASS INDEX: 35.55 KG/M2 | HEIGHT: 69 IN | TEMPERATURE: 98 F | DIASTOLIC BLOOD PRESSURE: 124 MMHG

## 2020-12-14 DIAGNOSIS — L03.012 CELLULITIS OF FINGER OF LEFT HAND: Primary | ICD-10-CM

## 2020-12-14 PROCEDURE — 74011250637 HC RX REV CODE- 250/637: Performed by: EMERGENCY MEDICINE

## 2020-12-14 PROCEDURE — 99283 EMERGENCY DEPT VISIT LOW MDM: CPT

## 2020-12-14 RX ORDER — SULFAMETHOXAZOLE AND TRIMETHOPRIM 800; 160 MG/1; MG/1
2 TABLET ORAL
Status: COMPLETED | OUTPATIENT
Start: 2020-12-14 | End: 2020-12-14

## 2020-12-14 RX ORDER — CEPHALEXIN 500 MG/1
500 CAPSULE ORAL 4 TIMES DAILY
Qty: 40 CAP | Refills: 0 | Status: SHIPPED | OUTPATIENT
Start: 2020-12-14 | End: 2020-12-24

## 2020-12-14 RX ORDER — CEPHALEXIN 250 MG/1
500 CAPSULE ORAL
Status: COMPLETED | OUTPATIENT
Start: 2020-12-14 | End: 2020-12-14

## 2020-12-14 RX ORDER — SULFAMETHOXAZOLE AND TRIMETHOPRIM 800; 160 MG/1; MG/1
2 TABLET ORAL 2 TIMES DAILY
Qty: 40 TAB | Refills: 0 | Status: SHIPPED | OUTPATIENT
Start: 2020-12-14 | End: 2020-12-24

## 2020-12-14 RX ADMIN — SULFAMETHOXAZOLE AND TRIMETHOPRIM 2 TABLET: 800; 160 TABLET ORAL at 05:48

## 2020-12-14 RX ADMIN — CEPHALEXIN 500 MG: 250 CAPSULE ORAL at 05:48

## 2020-12-14 NOTE — ED TRIAGE NOTES
Pt arrives with left hand ring finger swelling. Pt denies any injury to hand that he can remember. Pt reports chills, and minor drainage yesterday afternoon.  Area, swollen, red painful

## 2020-12-14 NOTE — ED NOTES
5:50 AM  12/14/20     Discharge instructions given to patient (name) with verbalization of understanding. Patient accompanied by self. Patient discharged with the following prescriptions Keflex, Bactrim. Patient discharged to home (destination).       Sandrine Aguiar RN

## 2020-12-14 NOTE — DISCHARGE INSTRUCTIONS

## 2020-12-14 NOTE — ED PROVIDER NOTES
EMERGENCY DEPARTMENT HISTORY AND PHYSICAL EXAM    5:42 AM      Date: (Not on file)  Patient Name: Harrison Huertas    History of Presenting Illness     Chief Complaint   Patient presents with    Finger Pain         History Provided By: patient    Additional History (Context): Harrison Huertas is a 60 y.o. male presents with soreness and redness, left fourth digit dorsal service proximal phalanx. Few 2 mm vesicles overlying unable to express any significant amount of fluid there is some erythema as well up to his MP joint. 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 No known trauma. Jeniffer Oconnor PCP: Gabby David MD    Chief Complaint:   Duration:    Timing:    Location:   Quality:   Severity:   Modifying Factors:   Associated Symptoms:       Current Facility-Administered Medications   Medication Dose Route Frequency Provider Last Rate Last Dose    cephALEXin (KEFLEX) capsule 500 mg  500 mg Oral NOW Kobe Kimbrough MD        trimethoprim-sulfamethoxazole (BACTRIM DS, SEPTRA DS) 160-800 mg per tablet 2 Tab  2 Tab Oral NOW Melquiades Hare MD         Current Outpatient Medications   Medication Sig Dispense Refill    cephALEXin (Keflex) 500 mg capsule Take 1 Cap by mouth four (4) times daily for 10 days. 40 Cap 0    trimethoprim-sulfamethoxazole (Bactrim DS) 160-800 mg per tablet Take 2 Tabs by mouth two (2) times a day for 10 days. 40 Tab 0    omeprazole (PRILOSEC OTC) 20 mg tablet Take 20 mg by mouth daily. Indications: gastroesophageal reflux disease      cyclobenzaprine (FLEXERIL) 10 mg tablet Take 1 Tab by mouth three (3) times daily as needed for Muscle Spasm(s). 20 Tab 0    ketorolac (TORADOL) 10 mg tablet Take 1 Tab by mouth every six (6) hours as needed for Pain. Avoid taking any other NSAIDS 15 Tab 0    carbamide peroxide (DEBROX) 6.5 % otic solution Administer 5 Drops into each ear two (2) times a day.  15 mL 0       Past History     Past Medical History:  Past Medical History: Diagnosis Date    Skin cancer        Past Surgical History:  No past surgical history on file. Family History:  No family history on file. Social History:  Social History     Tobacco Use    Smoking status: Current Some Day Smoker    Smokeless tobacco: Current User   Substance Use Topics    Alcohol use: Not on file    Drug use: Not on file       Allergies: Allergies   Allergen Reactions    Amoxicillin Hives    Other Medication Other (comments)     No narcotics/hx addiction    Penicillins Hives         Review of Systems     Review of Systems   Constitutional: Negative for diaphoresis and fever. HENT: Negative for congestion and sore throat. Eyes: Negative for pain and itching. Respiratory: Negative for cough and shortness of breath. Cardiovascular: Negative for chest pain and palpitations. Gastrointestinal: Negative for abdominal pain and diarrhea. Endocrine: Negative for polydipsia and polyuria. Genitourinary: Negative for dysuria and hematuria. Musculoskeletal: Negative for arthralgias and myalgias. Skin: Positive for rash and wound. Neurological: Negative for seizures and syncope. Hematological: Does not bruise/bleed easily. Psychiatric/Behavioral: Negative for agitation and hallucinations. Physical Exam       Patient Vitals for the past 12 hrs:   Temp Pulse Resp BP SpO2   12/14/20 0531 98 °F (36.7 °C) 72 18 (!) 160/124 98 %       Physical Exam  Vitals signs and nursing note reviewed. Constitutional:       General: He is not in acute distress. Appearance: He is well-developed. He is obese. He is not ill-appearing. HENT:      Head: Normocephalic and atraumatic. Eyes:      General: No scleral icterus. Conjunctiva/sclera: Conjunctivae normal.   Neck:      Musculoskeletal: Normal range of motion and neck supple. Vascular: No JVD. Cardiovascular:      Rate and Rhythm: Normal rate and regular rhythm.    Pulmonary:      Effort: Pulmonary effort is normal. No respiratory distress. Musculoskeletal: Normal range of motion. Comments: Left fourth digit dorsal aspect proximal phalanx approximately centimeter and a half area of erythema with some vesicles with serous fluid scant, trace purulent material.  There is no area of firmness or fluctuance to suggest abscess there is no redness tracking up, he has good motion of the digit good distal capillary refill and very little pain with movement. Skin:     General: Skin is warm and dry. Neurological:      Mental Status: He is alert. Psychiatric:         Thought Content: Thought content normal.         Judgment: Judgment normal.           Diagnostic Study Results   Labs -  No results found for this or any previous visit (from the past 12 hour(s)). Radiologic Studies -   No orders to display     No results found. Medications ordered:   Medications   cephALEXin (KEFLEX) capsule 500 mg (has no administration in time range)   trimethoprim-sulfamethoxazole (BACTRIM DS, SEPTRA DS) 160-800 mg per tablet 2 Tab (has no administration in time range)         Medical Decision Making   Initial Medical Decision Making and DDx:  No suggestive of cellulitis with very small abscess, 2 to 4 mm. Nothing to be unroofed or drained. No extensive area of infection. Will start him on antibiotics, follow-up with hand surgery for any complications. He is happy with this plan ready for discharge. ED Course: Progress Notes, Reevaluation, and Consults:         I am the first provider for this patient. I reviewed the vital signs, available nursing notes, past medical history, past surgical history, family history and social history. Patient Vitals for the past 12 hrs:   Temp Pulse Resp BP SpO2   12/14/20 0531 98 °F (36.7 °C) 72 18 (!) 160/124 98 %       Vital Signs-Reviewed the patient's vital signs. Pulse Oximetry Analysis, Cardiac Monitor, 12 lead ekg:     Interpreted by the EP.       Records Reviewed: Nursing notes reviewed (Time of Review: 5:42 AM)    Procedures:   Critical Care Time:   Aspirin: (was aspirin given for stroke?)    Diagnosis     Clinical Impression:   1. Cellulitis of finger of left hand        Disposition: Discharged      Follow-up Information     Follow up With Specialties Details Why [address] [place] [address] Drive, Isidro Benjamin MD Orthopedic Surgery, Orthopedic Surgery, Orthopedic Surgery In 3 days  [facility]  [address] [place] [address]  [phone]             Patient's Medications   Start Taking    CEPHALEXIN (KEFLEX) 500 MG CAPSULE    Take 1 Cap by mouth four (4) times daily for 10 days. TRIMETHOPRIM-SULFAMETHOXAZOLE (BACTRIM DS) 160-800 MG PER TABLET    Take 2 Tabs by mouth two (2) times a day for 10 days. Continue Taking    CARBAMIDE PEROXIDE (DEBROX) 6.5 % OTIC SOLUTION    Administer 5 Drops into each ear two (2) times a day. CYCLOBENZAPRINE (FLEXERIL) 10 MG TABLET    Take 1 Tab by mouth three (3) times daily as needed for Muscle Spasm(s). KETOROLAC (TORADOL) 10 MG TABLET    Take 1 Tab by mouth every six (6) hours as needed for Pain. Avoid taking any other NSAIDS    OMEPRAZOLE (PRILOSEC OTC) 20 MG TABLET    Take 20 mg by mouth daily.  Indications: gastroesophageal reflux disease   These Medications have changed    No medications on file   Stop Taking    No medications on file     _______________________________    Notes:    Jose Mcclellan MD using Dragon dictation      _______________________________

## 2022-04-12 ENCOUNTER — OFFICE VISIT (OUTPATIENT)
Dept: ORTHOPEDIC SURGERY | Age: 67
End: 2022-04-12

## 2022-04-12 DIAGNOSIS — M17.11 OSTEOARTHRITIS OF RIGHT KNEE, UNSPECIFIED OSTEOARTHRITIS TYPE: ICD-10-CM

## 2022-04-12 DIAGNOSIS — M25.561 RIGHT KNEE PAIN, UNSPECIFIED CHRONICITY: Primary | ICD-10-CM

## 2022-04-12 DIAGNOSIS — M25.561 RIGHT KNEE PAIN, UNSPECIFIED CHRONICITY: ICD-10-CM

## 2022-04-12 PROCEDURE — 1101F PT FALLS ASSESS-DOCD LE1/YR: CPT | Performed by: ORTHOPAEDIC SURGERY

## 2022-04-12 PROCEDURE — G8432 DEP SCR NOT DOC, RNG: HCPCS | Performed by: ORTHOPAEDIC SURGERY

## 2022-04-12 PROCEDURE — 3017F COLORECTAL CA SCREEN DOC REV: CPT | Performed by: ORTHOPAEDIC SURGERY

## 2022-04-12 PROCEDURE — G8536 NO DOC ELDER MAL SCRN: HCPCS | Performed by: ORTHOPAEDIC SURGERY

## 2022-04-12 PROCEDURE — G8427 DOCREV CUR MEDS BY ELIG CLIN: HCPCS | Performed by: ORTHOPAEDIC SURGERY

## 2022-04-12 PROCEDURE — 99214 OFFICE O/P EST MOD 30 MIN: CPT | Performed by: ORTHOPAEDIC SURGERY

## 2022-04-12 PROCEDURE — G8421 BMI NOT CALCULATED: HCPCS | Performed by: ORTHOPAEDIC SURGERY

## 2022-04-12 NOTE — LETTER
4/13/2022    Patient: Janette Mccabe   YOB: 1955   Date of Visit: 4/12/2022     Chaparrita Bailey, [address] [address]  Via Fax: [phone]    Dear Chaparrita Bailey MD,      Thank you for referring Mr. Amy Wang to [facility] [address] [place] [address] for evaluation. My notes for this consultation are attached. If you have questions, please do not hesitate to call me. I look forward to following your patient along with you.       Sincerely,    Bernice Bennett MD

## 2022-04-12 NOTE — PATIENT INSTRUCTIONS
Knee Arthritis: Care Instructions  Your Care Instructions     Knee arthritis is a breakdown of the cartilage that cushions your knee joint. When the cartilage wears down, your bones rub against each other. This causes pain and stiffness. Knee arthritis tends to get worse with time. Treatment for knee arthritis involves reducing pain, making the leg muscles stronger, and staying at a healthy body weight. The treatment usually does not improve the health of the cartilage, but it can reduce pain and improve how well your knee works. You can take simple measures to protect your knee joints, ease your pain, and help you stay active. Follow-up care is a key part of your treatment and safety. Be sure to make and go to all appointments, and call your doctor if you are having problems. It's also a good idea to know your test results and keep a list of the medicines you take. How can you care for yourself at home? · Know that knee arthritis will cause more pain on some days than on others. · Stay at a healthy weight. Lose weight if you are overweight. When you stand up, the pressure on your knees from every pound of body weight is multiplied four times. So if you lose 10 pounds, you will reduce the pressure on your knees by 40 pounds. · Talk to your doctor or physical therapist about exercises that will help ease joint pain. ? Stretch to help prevent stiffness and to prevent injury before you exercise. You may enjoy gentle forms of yoga to help keep your knee joints and muscles flexible. ? Walk instead of jog.  ? Ride a bike. This makes your thigh muscles stronger and takes pressure off your knee. ? Wear well-fitting and comfortable shoes. ? Exercise in chest-deep water. This can help you exercise longer with less pain. ? Avoid exercises that include squatting or kneeling. They can put a lot of strain on your knees.   ? Talk to your doctor to make sure that the exercise you do is not making the arthritis worse.  · Do not sit for long periods of time. Try to walk once in a while to keep your knee from getting stiff. · Ask your doctor or physical therapist whether shoe inserts may reduce your arthritis pain. · If you can afford it, get new athletic shoes at least every year. This can help reduce the strain on your knees. · Use a device to help you do everyday activities. ? A cane or walking stick can help you keep your balance when you walk. Hold the cane or walking stick in the hand opposite the painful knee. ? If you feel like you may fall when you walk, try using crutches or a front-wheeled walker. These can prevent falls that could cause more damage to your knee. ? A knee brace may help keep your knee stable and prevent pain. ? You also can use other things to make life easier, such as a higher toilet seat and handrails in the bathtub or shower. · Take pain medicines exactly as directed. ? Do not wait until you are in severe pain. You will get better results if you take it sooner. ? If you are not taking a prescription pain medicine, take an over-the-counter medicine such as acetaminophen (Tylenol), ibuprofen (Advil, Motrin), or naproxen (Aleve). Read and follow all instructions on the label. ? Do not take two or more pain medicines at the same time unless the doctor told you to. Many pain medicines have acetaminophen, which is Tylenol. Too much acetaminophen (Tylenol) can be harmful. ? Tell your doctor if you take a blood thinner, have diabetes, or have allergies to shellfish. · Ask your doctor if you might benefit from a shot of steroid medicine into your knee. This may provide pain relief for several months. · Many people take the supplements glucosamine and chondroitin for osteoarthritis. Some people feel they help, but the medical research does not show that they work. Talk to your doctor before you take these supplements. When should you call for help?    Call your doctor now or seek immediate medical care if:    · You have sudden swelling, warmth, or pain in your knee.     · You have knee pain and a fever or rash.     · You have such bad pain that you cannot use your knee. Watch closely for changes in your health, and be sure to contact your doctor if you have any problems. Where can you learn more? Go to http://www.gray.com/  Enter W187 in the search box to learn more about \"Knee Arthritis: Care Instructions. \"  Current as of: December 20, 2021               Content Version: 13.2  © 4391-5927 [employer]. Care instructions adapted under license by [employer] (which disclaims liability or warranty for this information). If you have questions about a medical condition or this instruction, always ask your healthcare professional. [address] any warranty or liability for your use of this information.

## 2022-04-12 NOTE — PROGRESS NOTES
Name: Irwin Allen    :      Service Dept: [facility]    Chief Complaint   Patient presents with    Knee Pain        There were no vitals taken for this visit. Allergies   Allergen Reactions    Amoxicillin Hives    Other Medication Other (comments)     No narcotics/hx addiction    Penicillins Hives        Current Outpatient Medications   Medication Sig Dispense Refill    omeprazole (PRILOSEC OTC) 20 mg tablet Take 20 mg by mouth daily. Indications: gastroesophageal reflux disease      cyclobenzaprine (FLEXERIL) 10 mg tablet Take 1 Tab by mouth three (3) times daily as needed for Muscle Spasm(s). 20 Tab 0    ketorolac (TORADOL) 10 mg tablet Take 1 Tab by mouth every six (6) hours as needed for Pain. Avoid taking any other NSAIDS 15 Tab 0    carbamide peroxide (DEBROX) 6.5 % otic solution Administer 5 Drops into each ear two (2) times a day. 15 mL 0      Patient Active Problem List   Diagnosis Code    Cellulitis of right leg L03.115    Infected wound T14. 8XXA, L08.9    Tobacco abuse Z72.0      History reviewed. No pertinent family history. Social History     Socioeconomic History    Marital status: SINGLE   Tobacco Use    Smoking status: Current Some Day Smoker    Smokeless tobacco: Current User      History reviewed. No pertinent surgical history. Past Medical History:   Diagnosis Date    Skin cancer         I have reviewed and agree with Fazal Nova and ODILIA and intake form in chart and the record furthermore I have reviewed prior medical record(s) regarding this patients care during this appointment. Review of Systems:   Patient is a pleasant appearing individual, appropriately dressed, well hydrated, well nourished, who is alert, appropriately oriented for age, and in no acute distress with a normal gait and normal affect who does not appear to be in any significant pain.    Physical Exam:  Right Knee -Decrease range of motion with flexion, Knee arc of greater than 50 degrees, Some crepitation, Grossly neurovascularly intact, Good cap refill, No skin lesion, Moderate swelling, No gross instability, Some quadriceps weakness, Kellgren and Angel at least grade 3    Left Knee - Full Range of Motion, No crepitation, Grossly neurovascularly intact, Good cap refill, No skin lesion, No swelling, No gross instability, No quadriceps weakness     Encounter Diagnoses     ICD-10-CM ICD-9-CM   1. Right knee pain, unspecified chronicity  M25.561 719.46   2. Osteoarthritis of right knee, unspecified osteoarthritis type  M17.11 715.96       HPI:  The patient is here with a chief complaint of right knee pain, dull, throbbing pain, progressively getting worse. Pain is 9/10. X-rays of the right knee are unremarkable except for severe OA. Assessment/Plan:  Plan is for right total knee replacement, general medical clearance, outpatient surgery and we will go from there. As part of continued conservative pain management options the patient was advised to utilize Tylenol or OTC NSAIDS as long as it is not medically contraindicated. Return to Office: Follow-up and Dispositions    · Return for schedule for surgery. Scribed by Meggan Velasquez LPN as dictated by [facility] JOSELITO Clarke MD.  Documentation True and Accepted Vasiliy Clarke MD

## 2022-08-19 DIAGNOSIS — M25.561 RIGHT KNEE PAIN, UNSPECIFIED CHRONICITY: ICD-10-CM

## 2022-08-19 DIAGNOSIS — M17.11 OSTEOARTHRITIS OF RIGHT KNEE, UNSPECIFIED OSTEOARTHRITIS TYPE: ICD-10-CM

## 2022-09-01 DIAGNOSIS — Z96.651 STATUS POST TOTAL RIGHT KNEE REPLACEMENT: Primary | ICD-10-CM

## 2022-09-08 ENCOUNTER — OFFICE VISIT (OUTPATIENT)
Dept: ORTHOPEDIC SURGERY | Age: 67
End: 2022-09-08
Payer: MEDICARE

## 2022-09-08 ENCOUNTER — HOSPITAL ENCOUNTER (OUTPATIENT)
Dept: PREADMISSION TESTING | Age: 67
Discharge: HOME | End: 2022-09-08

## 2022-09-08 ENCOUNTER — ANESTHESIA EVENT (OUTPATIENT)
Dept: SURGERY | Age: 67
End: 2022-09-08
Payer: MEDICARE

## 2022-09-08 DIAGNOSIS — M25.561 RIGHT KNEE PAIN, UNSPECIFIED CHRONICITY: Primary | ICD-10-CM

## 2022-09-08 DIAGNOSIS — M17.11 OSTEOARTHRITIS OF RIGHT KNEE, UNSPECIFIED OSTEOARTHRITIS TYPE: ICD-10-CM

## 2022-09-08 PROCEDURE — G8536 NO DOC ELDER MAL SCRN: HCPCS | Performed by: ORTHOPAEDIC SURGERY

## 2022-09-08 PROCEDURE — G8421 BMI NOT CALCULATED: HCPCS | Performed by: ORTHOPAEDIC SURGERY

## 2022-09-08 PROCEDURE — G8432 DEP SCR NOT DOC, RNG: HCPCS | Performed by: ORTHOPAEDIC SURGERY

## 2022-09-08 PROCEDURE — 3017F COLORECTAL CA SCREEN DOC REV: CPT | Performed by: ORTHOPAEDIC SURGERY

## 2022-09-08 PROCEDURE — 99214 OFFICE O/P EST MOD 30 MIN: CPT | Performed by: ORTHOPAEDIC SURGERY

## 2022-09-08 PROCEDURE — G8427 DOCREV CUR MEDS BY ELIG CLIN: HCPCS | Performed by: ORTHOPAEDIC SURGERY

## 2022-09-08 PROCEDURE — 1123F ACP DISCUSS/DSCN MKR DOCD: CPT | Performed by: ORTHOPAEDIC SURGERY

## 2022-09-08 PROCEDURE — 1101F PT FALLS ASSESS-DOCD LE1/YR: CPT | Performed by: ORTHOPAEDIC SURGERY

## 2022-09-08 RX ORDER — CLINDAMYCIN HYDROCHLORIDE 300 MG/1
300 CAPSULE ORAL EVERY 8 HOURS
Qty: 9 CAPSULE | Refills: 0 | Status: SHIPPED | OUTPATIENT
Start: 2022-09-08 | End: 2022-09-08 | Stop reason: CLARIF

## 2022-09-08 RX ORDER — OXYCODONE AND ACETAMINOPHEN 5; 325 MG/1; MG/1
1 TABLET ORAL
Qty: 30 TABLET | Refills: 0 | Status: SHIPPED | OUTPATIENT
Start: 2022-09-08 | End: 2022-09-21 | Stop reason: ALTCHOICE

## 2022-09-08 RX ORDER — CLINDAMYCIN PHOSPHATE 900 MG/50ML
900 INJECTION INTRAVENOUS ONCE
Status: CANCELLED | OUTPATIENT
Start: 2022-09-08 | End: 2022-09-08

## 2022-09-08 RX ORDER — DEXTROSE 50 % IN WATER (D50W) INTRAVENOUS SYRINGE
25-50 AS NEEDED
Status: CANCELLED | OUTPATIENT
Start: 2022-09-08

## 2022-09-08 RX ORDER — ONDANSETRON 4 MG/1
4 TABLET, ORALLY DISINTEGRATING ORAL
Qty: 20 TABLET | Refills: 0 | Status: SHIPPED | OUTPATIENT
Start: 2022-09-08

## 2022-09-08 RX ORDER — CLINDAMYCIN HYDROCHLORIDE 300 MG/1
300 CAPSULE ORAL EVERY 6 HOURS
Qty: 28 CAPSULE | Refills: 0 | Status: ON HOLD | OUTPATIENT
Start: 2022-09-08 | End: 2022-09-14 | Stop reason: SDUPTHER

## 2022-09-08 RX ORDER — MAGNESIUM SULFATE 100 %
4 CRYSTALS MISCELLANEOUS AS NEEDED
Status: CANCELLED | OUTPATIENT
Start: 2022-09-08

## 2022-09-08 NOTE — PROGRESS NOTES
Name: Riaz Munguia    :      Service Dept: [address] [facility] Medicine    Chief Complaint   Patient presents with    Pre-op Exam    Knee Pain        There were no vitals taken for this visit. Allergies   Allergen Reactions    Amoxicillin Hives    Other Medication Other (comments)     No narcotics/hx addiction    Penicillins Hives        Current Outpatient Medications   Medication Sig Dispense Refill    omeprazole (PRILOSEC OTC) 20 mg tablet Take 20 mg by mouth daily. Indications: gastroesophageal reflux disease      carbamide peroxide (DEBROX) 6.5 % otic solution Administer 5 Drops into each ear two (2) times a day. 15 mL 0      Patient Active Problem List   Diagnosis Code    Cellulitis of right leg L03.115    Infected wound T14. 8XXA, L08.9    Tobacco abuse Z72.0      History reviewed. No pertinent family history. Social History     Socioeconomic History    Marital status: SINGLE   Tobacco Use    Smoking status: Some Days    Smokeless tobacco: Current      History reviewed. No pertinent surgical history. Past Medical History:   Diagnosis Date    Skin cancer         I have reviewed and agree with [address] [place] [address] Nw and ROS and intake form in chart and the record furthermore I have reviewed prior medical record(s) regarding this patients care during this appointment. Review of Systems:   Patient is a pleasant appearing individual, appropriately dressed, well hydrated, well nourished, who is alert, appropriately oriented for age, and in no acute distress with a normal gait and normal affect who does not appear to be in any significant pain.     Physical Exam:  Right Knee -Decrease range of motion with flexion, Knee arc of greater than 50 degrees, Some crepitation, Grossly neurovascularly intact, Good cap refill, No skin lesion, Moderate swelling, some gross instability, Some quadriceps weakness, Kellgren and Angel at least grade 3    Left Knee - Full Range of Motion, No crepitation, Grossly neurovascularly intact, Good cap refill, No skin lesion, No swelling, No gross instability, No quadriceps weakness    Inpatient status: The patient has admitted to severe pain in the affected knee and due to such pain they are unable to complete activities of daily living at home and/or work on a regular basis where conservative treatments have failed. After extensive discussion with the patient, they have chosen to receive a total knee replacement with the expectation of inpatient procedure. Their dependent functional status (i.e. lack of capable support and safety at home, pain management, comorbities, or difficulty ambulating with assistive walking devices) would deem them a candidate for an inpatient stay. The patient acknowledges and understand the plan. The risks of surgery were explained to the patient which include but not limited to infection, nerve injury, artery injury, tendon injury, poor result, poor wound healing, unforeseen incidence, bleeding, infection, nerve damage, failure to improve, worsening of symptoms, morbidity, and mortality risks were explained. All questions were answered. Patient was told of no guarantees. Patient accepts all risks and benefits. A consent for surgery will be documented and signed by the patient or a legal guardian. All questions were answered. The procedure was explained in detail. The patient was counseled about the risks of krystal Covid-19 during their perioperative period and any recovery window from their procedure. The patient was made aware that krystal Covid-19 may worsen their prognosis for recovering from their procedure and lend to a higher morbidity and/or mortality risk. All material risks, benefits, and reasonable alternatives including postponing the procedure were discussed. The patient DOES wish to proceed with their procedure at this time. Encounter Diagnoses     ICD-10-CM ICD-9-CM   1.  Right knee pain, unspecified chronicity  M25.561 719.46   2. Osteoarthritis of right knee, unspecified osteoarthritis type  M17.11 715.96       HPI:  The patient is here with a chief complaint of right knee pain, throbbing, burning pain, progressively getting worse. Pain is 9/10. X-rays of the right knee are positive for severe OA. Assessment/Plan:  Plan is for right total knee replacement. Percocet, clindamycin, Zofran and aspirin for DVT prophylaxis. He does have an abrasion on his knee. We told him to keep it clean and dry so we can perform the surgery and go from there. As part of continued conservative pain management options the patient was advised to utilize Tylenol or OTC NSAIDS as long as it is not medically contraindicated. Return to Office: Follow-up and Dispositions    Return for already scheduled for surgery. Scribed by Mercedes Thomas LPN as dictated by [facility] JOSELITO Gomez MD.  Documentation True and Accepted [facility] JOSELITO Gomez MD

## 2022-09-08 NOTE — LETTER
9/12/2022    Patient: Adolph Burnham   YOB: 1955   Date of Visit: 9/8/2022     Laureano Schwarz MD  [address] [facility] [address]  Via Fax: [phone]    Dear Laureano Schwarz MD,      Thank you for referring Mr. Genie Moreno to [address] for evaluation. My notes for this consultation are attached. If you have questions, please do not hesitate to call me. I look forward to following your patient along with you.       Sincerely,    Mary Ch MD

## 2022-09-08 NOTE — PATIENT INSTRUCTIONS
Knee Arthritis: Care Instructions  Your Care Instructions     Knee arthritis is a breakdown of the cartilage that cushions your knee joint. When the cartilage wears down, your bones rub against each other. This causes pain and stiffness. Knee arthritis tends to get worse with time. Treatment for knee arthritis involves reducing pain, making the leg muscles stronger, and staying at a healthy body weight. The treatment usually does not improve the health of the cartilage, but it can reduce pain and improve how well your knee works. You can take simple measures to protect your knee joints, ease your pain, and help you stay active. Follow-up care is a key part of your treatment and safety. Be sure to make and go to all appointments, and call your doctor if you are having problems. It's also a good idea to know your test results and keep a list of the medicines you take. How can you care for yourself at home? Know that knee arthritis will cause more pain on some days than on others. Stay at a healthy weight. Lose weight if you are overweight. When you stand up, the pressure on your knees from every pound of body weight is multiplied four times. So if you lose 10 pounds, you will reduce the pressure on your knees by 40 pounds. Talk to your doctor or physical therapist about exercises that will help ease joint pain. Stretch to help prevent stiffness and to prevent injury before you exercise. You may enjoy gentle forms of yoga to help keep your knee joints and muscles flexible. Walk instead of jog. Ride a bike. This makes your thigh muscles stronger and takes pressure off your knee. Wear well-fitting and comfortable shoes. Exercise in chest-deep water. This can help you exercise longer with less pain. Avoid exercises that include squatting or kneeling. They can put a lot of strain on your knees. Talk to your doctor to make sure that the exercise you do is not making the arthritis worse.   Do not sit for long periods of time. Try to walk once in a while to keep your knee from getting stiff. Ask your doctor or physical therapist whether shoe inserts may reduce your arthritis pain. If you can afford it, get new athletic shoes at least every year. This can help reduce the strain on your knees. Use a device to help you do everyday activities. A cane or walking stick can help you keep your balance when you walk. Hold the cane or walking stick in the hand opposite the painful knee. If you feel like you may fall when you walk, try using crutches or a front-wheeled walker. These can prevent falls that could cause more damage to your knee. A knee brace may help keep your knee stable and prevent pain. You also can use other things to make life easier, such as a higher toilet seat and handrails in the bathtub or shower. Take pain medicines exactly as directed. Do not wait until you are in severe pain. You will get better results if you take it sooner. If you are not taking a prescription pain medicine, take an over-the-counter medicine such as acetaminophen (Tylenol), ibuprofen (Advil, Motrin), or naproxen (Aleve). Read and follow all instructions on the label. Do not take two or more pain medicines at the same time unless the doctor told you to. Many pain medicines have acetaminophen, which is Tylenol. Too much acetaminophen (Tylenol) can be harmful. Tell your doctor if you take a blood thinner, have diabetes, or have allergies to shellfish. Ask your doctor if you might benefit from a shot of steroid medicine into your knee. This may provide pain relief for several months. Many people take the supplements glucosamine and chondroitin for osteoarthritis. Some people feel they help, but the medical research does not show that they work. Talk to your doctor before you take these supplements. When should you call for help?    Call your doctor now or seek immediate medical care if:    You have sudden swelling, warmth, or pain in your knee. You have knee pain and a fever or rash. You have such bad pain that you cannot use your knee. Watch closely for changes in your health, and be sure to contact your doctor if you have any problems. Where can you learn more? Go to http://tiffanie-elliot.info/  Enter W187 in the search box to learn more about \"Knee Arthritis: Care Instructions. \"  Current as of: December 20, 2021               Content Version: 13.2  © 2006-2022 [employer]. Care instructions adapted under license by [employer] (which disclaims liability or warranty for this information). If you have questions about a medical condition or this instruction, always ask your healthcare professional. [address] any warranty or liability for your use of this information.

## 2022-09-08 NOTE — H&P (VIEW-ONLY)
Name: John Smith    : [id number]     Service Dept: [facility]    Chief Complaint   Patient presents with    Pre-op Exam    Knee Pain        There were no vitals taken for this visit. Allergies   Allergen Reactions    Amoxicillin Hives    Other Medication Other (comments)     No narcotics/hx addiction    Penicillins Hives        Current Outpatient Medications   Medication Sig Dispense Refill    omeprazole (PRILOSEC OTC) 20 mg tablet Take 20 mg by mouth daily. Indications: gastroesophageal reflux disease      carbamide peroxide (DEBROX) 6.5 % otic solution Administer 5 Drops into each ear two (2) times a day. 15 mL 0      Patient Active Problem List   Diagnosis Code    Cellulitis of right leg L03.115    Infected wound T14. 8XXA, L08.9    Tobacco abuse Z72.0      History reviewed. No pertinent family history. Social History     Socioeconomic History    Marital status: SINGLE   Tobacco Use    Smoking status: Some Days    Smokeless tobacco: Current      History reviewed. No pertinent surgical history. Past Medical History:   Diagnosis Date    Skin cancer         I have reviewed and agree with [facility] and ROS and intake form in chart and the record furthermore I have reviewed prior medical record(s) regarding this patients care during this appointment. Review of Systems:   Patient is a pleasant appearing individual, appropriately dressed, well hydrated, well nourished, who is alert, appropriately oriented for age, and in no acute distress with a normal gait and normal affect who does not appear to be in any significant pain.     Physical Exam:  Right Knee -Decrease range of motion with flexion, Knee arc of greater than 50 degrees, Some crepitation, Grossly neurovascularly intact, Good cap refill, No skin lesion, Moderate swelling, some gross instability, Some quadriceps weakness, Kellgren and Angel at least grade 3    Left Knee - Full Range of Motion, No crepitation, Grossly neurovascularly intact, Good cap refill, No skin lesion, No swelling, No gross instability, No quadriceps weakness    Inpatient status: The patient has admitted to severe pain in the affected knee and due to such pain they are unable to complete activities of daily living at home and/or work on a regular basis where conservative treatments have failed. After extensive discussion with the patient, they have chosen to receive a total knee replacement with the expectation of inpatient procedure. Their dependent functional status (i.e. lack of capable support and safety at home, pain management, comorbities, or difficulty ambulating with assistive walking devices) would deem them a candidate for an inpatient stay. The patient acknowledges and understand the plan. The risks of surgery were explained to the patient which include but not limited to infection, nerve injury, artery injury, tendon injury, poor result, poor wound healing, unforeseen incidence, bleeding, infection, nerve damage, failure to improve, worsening of symptoms, morbidity, and mortality risks were explained. All questions were answered. Patient was told of no guarantees. Patient accepts all risks and benefits. A consent for surgery will be documented and signed by the patient or a legal guardian. All questions were answered. The procedure was explained in detail. The patient was counseled about the risks of krystal Covid-19 during their perioperative period and any recovery window from their procedure. The patient was made aware that krystal Covid-19 may worsen their prognosis for recovering from their procedure and lend to a higher morbidity and/or mortality risk. All material risks, benefits, and reasonable alternatives including postponing the procedure were discussed. The patient DOES wish to proceed with their procedure at this time. Encounter Diagnoses     ICD-10-CM ICD-9-CM   1.  Right knee pain, unspecified chronicity  M25.561 719.46   2. Osteoarthritis of right knee, unspecified osteoarthritis type  M17.11 715.96       HPI:  The patient is here with a chief complaint of right knee pain, throbbing, burning pain, progressively getting worse. Pain is 9/10. X-rays of the right knee are positive for severe OA. Assessment/Plan:  Plan is for right total knee replacement. Percocet, clindamycin, Zofran and aspirin for DVT prophylaxis. He does have an abrasion on his knee. We told him to keep it clean and dry so we can perform the surgery and go from there. As part of continued conservative pain management options the patient was advised to utilize Tylenol or OTC NSAIDS as long as it is not medically contraindicated. Return to Office: Follow-up and Dispositions    Return for already scheduled for surgery. Scribed by Polina Jacobsen LPN as dictated by [facility] JOSELITO Solano MD.  Documentation True and Accepted [facility] JOSELITO Solano MD

## 2022-09-14 ENCOUNTER — HOSPITAL ENCOUNTER (OUTPATIENT)
Age: 67
Discharge: HOME | End: 2022-09-14
Attending: ORTHOPAEDIC SURGERY | Admitting: ORTHOPAEDIC SURGERY
Payer: MEDICARE

## 2022-09-14 ENCOUNTER — APPOINTMENT (OUTPATIENT)
Dept: GENERAL RADIOLOGY | Age: 67
End: 2022-09-14
Attending: NURSE PRACTITIONER
Payer: MEDICARE

## 2022-09-14 VITALS
HEART RATE: 68 BPM | WEIGHT: 247.2 LBS | DIASTOLIC BLOOD PRESSURE: 72 MMHG | RESPIRATION RATE: 16 BRPM | TEMPERATURE: 97.3 F | BODY MASS INDEX: 37.47 KG/M2 | HEIGHT: 68 IN | OXYGEN SATURATION: 97 % | SYSTOLIC BLOOD PRESSURE: 144 MMHG

## 2022-09-14 PROBLEM — M17.9 OA (OSTEOARTHRITIS) OF KNEE: Status: ACTIVE | Noted: 2022-09-14

## 2022-09-14 PROCEDURE — 74011000250 HC RX REV CODE- 250: Performed by: ORTHOPAEDIC SURGERY

## 2022-09-14 PROCEDURE — C1776 JOINT DEVICE (IMPLANTABLE): HCPCS | Performed by: ORTHOPAEDIC SURGERY

## 2022-09-14 PROCEDURE — C1713 ANCHOR/SCREW BN/BN,TIS/BN: HCPCS | Performed by: ORTHOPAEDIC SURGERY

## 2022-09-14 PROCEDURE — 76210000025 HC REC RM PH II 3 TO 3.5 HR: Performed by: ORTHOPAEDIC SURGERY

## 2022-09-14 PROCEDURE — 73560 X-RAY EXAM OF KNEE 1 OR 2: CPT

## 2022-09-14 PROCEDURE — 74011250637 HC RX REV CODE- 250/637: Performed by: NURSE PRACTITIONER

## 2022-09-14 PROCEDURE — 77030031140 HC SUT VCRL3 J&J -A: Performed by: ORTHOPAEDIC SURGERY

## 2022-09-14 PROCEDURE — 74011000250 HC RX REV CODE- 250: Performed by: NURSE ANESTHETIST, CERTIFIED REGISTERED

## 2022-09-14 PROCEDURE — 64450 NJX AA&/STRD OTHER PN/BRANCH: CPT | Performed by: NURSE ANESTHETIST, CERTIFIED REGISTERED

## 2022-09-14 PROCEDURE — 76060000035 HC ANESTHESIA 2 TO 2.5 HR: Performed by: ORTHOPAEDIC SURGERY

## 2022-09-14 PROCEDURE — 77030011266 HC ELECTRD BLD INSL COVD -A: Performed by: ORTHOPAEDIC SURGERY

## 2022-09-14 PROCEDURE — 77030040393 HC DRSG OPTIFOAM GENT MDII -B: Performed by: ORTHOPAEDIC SURGERY

## 2022-09-14 PROCEDURE — 77030031139 HC SUT VCRL2 J&J -A: Performed by: ORTHOPAEDIC SURGERY

## 2022-09-14 PROCEDURE — 77030007866 HC KT SPN ANES BBMI -B: Performed by: NURSE ANESTHETIST, CERTIFIED REGISTERED

## 2022-09-14 PROCEDURE — 74011000272 HC RX REV CODE- 272: Performed by: ORTHOPAEDIC SURGERY

## 2022-09-14 PROCEDURE — 77030039147 HC PWDR HEMSTS SURGICEL JNJ -D: Performed by: ORTHOPAEDIC SURGERY

## 2022-09-14 PROCEDURE — 74011250636 HC RX REV CODE- 250/636: Performed by: NURSE ANESTHETIST, CERTIFIED REGISTERED

## 2022-09-14 PROCEDURE — 76010000131 HC OR TIME 2 TO 2.5 HR: Performed by: ORTHOPAEDIC SURGERY

## 2022-09-14 PROCEDURE — 77030013079 HC BLNKT BAIR HGGR 3M -A: Performed by: NURSE ANESTHETIST, CERTIFIED REGISTERED

## 2022-09-14 PROCEDURE — 77030018673: Performed by: ORTHOPAEDIC SURGERY

## 2022-09-14 PROCEDURE — 97116 GAIT TRAINING THERAPY: CPT

## 2022-09-14 PROCEDURE — 76942 ECHO GUIDE FOR BIOPSY: CPT | Performed by: NURSE ANESTHETIST, CERTIFIED REGISTERED

## 2022-09-14 PROCEDURE — 77030013708 HC HNDPC SUC IRR PULS STRY –B: Performed by: ORTHOPAEDIC SURGERY

## 2022-09-14 PROCEDURE — 77030029372 HC ADH SKN CLSR PRINEO J&J -C: Performed by: ORTHOPAEDIC SURGERY

## 2022-09-14 PROCEDURE — 77030003601 HC NDL NRV BLK BBMI -A: Performed by: NURSE ANESTHETIST, CERTIFIED REGISTERED

## 2022-09-14 PROCEDURE — 77030038692 HC WND DEB SYS IRMX -B: Performed by: ORTHOPAEDIC SURGERY

## 2022-09-14 PROCEDURE — 77030006835 HC BLD SAW SAG STRY -B: Performed by: ORTHOPAEDIC SURGERY

## 2022-09-14 PROCEDURE — 77030041690 HC SYS PINNING KN JNJ -D: Performed by: ORTHOPAEDIC SURGERY

## 2022-09-14 PROCEDURE — 77030000032 HC CUF TRNQT ZIMM -B: Performed by: ORTHOPAEDIC SURGERY

## 2022-09-14 PROCEDURE — 74011250636 HC RX REV CODE- 250/636: Performed by: NURSE PRACTITIONER

## 2022-09-14 PROCEDURE — 97161 PT EVAL LOW COMPLEX 20 MIN: CPT

## 2022-09-14 PROCEDURE — 2709999900 HC NON-CHARGEABLE SUPPLY: Performed by: ORTHOPAEDIC SURGERY

## 2022-09-14 PROCEDURE — 74011250637 HC RX REV CODE- 250/637: Performed by: NURSE ANESTHETIST, CERTIFIED REGISTERED

## 2022-09-14 PROCEDURE — 77030006812 HC BLD SAW RECIP STRY -B: Performed by: ORTHOPAEDIC SURGERY

## 2022-09-14 PROCEDURE — 74011000258 HC RX REV CODE- 258: Performed by: NURSE ANESTHETIST, CERTIFIED REGISTERED

## 2022-09-14 PROCEDURE — 77030040361 HC SLV COMPR DVT MDII -B: Performed by: ORTHOPAEDIC SURGERY

## 2022-09-14 PROCEDURE — 76210000063 HC OR PH I REC FIRST 0.5 HR: Performed by: ORTHOPAEDIC SURGERY

## 2022-09-14 PROCEDURE — 77030041614 HC WRP CLD THER BREG -B: Performed by: ORTHOPAEDIC SURGERY

## 2022-09-14 DEVICE — KNEE K1 TOT HEMI STD CEM IMPL CAPPED SYNTHES: Type: IMPLANTABLE DEVICE | Site: KNEE | Status: FUNCTIONAL

## 2022-09-14 DEVICE — ATTUNE PATELLA MEDIALIZED DOME 41MM CEMENTED AOX
Type: IMPLANTABLE DEVICE | Site: KNEE | Status: FUNCTIONAL
Brand: ATTUNE

## 2022-09-14 DEVICE — CEMENT BNE GENTAMC HV R+G 40GM -- PALACOS R+G 5036964: Type: IMPLANTABLE DEVICE | Site: KNEE | Status: FUNCTIONAL

## 2022-09-14 DEVICE — ATTUNE KNEE SYSTEM REVISION ROTATING PLATFORM TIBIAL BASE CEMENTED SIZE 8
Type: IMPLANTABLE DEVICE | Site: KNEE | Status: FUNCTIONAL
Brand: ATTUNE

## 2022-09-14 DEVICE — ATTUNE KNEE SYSTEM TIBIAL INSERT ROTATING PLATFORM POSTERIOR STABILIZED 8 5MM AOX
Type: IMPLANTABLE DEVICE | Site: KNEE | Status: FUNCTIONAL
Brand: ATTUNE

## 2022-09-14 DEVICE — ATTUNE KNEE SYSTEM FEMORAL POSTERIOR STABILIZED SIZE 8 RIGHT CEMENTED
Type: IMPLANTABLE DEVICE | Site: KNEE | Status: FUNCTIONAL
Brand: ATTUNE

## 2022-09-14 RX ORDER — MAGNESIUM SULFATE 100 %
4 CRYSTALS MISCELLANEOUS AS NEEDED
Status: CANCELLED | OUTPATIENT
Start: 2022-09-14

## 2022-09-14 RX ORDER — FLUMAZENIL 0.1 MG/ML
0.2 INJECTION INTRAVENOUS
Status: DISCONTINUED | OUTPATIENT
Start: 2022-09-14 | End: 2022-09-14 | Stop reason: HOSPADM

## 2022-09-14 RX ORDER — SODIUM CHLORIDE 0.9 % (FLUSH) 0.9 %
5-40 SYRINGE (ML) INJECTION AS NEEDED
Status: DISCONTINUED | OUTPATIENT
Start: 2022-09-14 | End: 2022-09-14 | Stop reason: HOSPADM

## 2022-09-14 RX ORDER — DEXTROSE 50 % IN WATER (D50W) INTRAVENOUS SYRINGE
25-50 AS NEEDED
Status: CANCELLED | OUTPATIENT
Start: 2022-09-14

## 2022-09-14 RX ORDER — FACIAL-BODY WIPES
10 EACH TOPICAL DAILY PRN
Status: CANCELLED | OUTPATIENT
Start: 2022-09-14

## 2022-09-14 RX ORDER — SODIUM CHLORIDE 0.9 % (FLUSH) 0.9 %
5-40 SYRINGE (ML) INJECTION EVERY 8 HOURS
Status: CANCELLED | OUTPATIENT
Start: 2022-09-14

## 2022-09-14 RX ORDER — GABAPENTIN 300 MG/1
300 CAPSULE ORAL
Status: COMPLETED | OUTPATIENT
Start: 2022-09-14 | End: 2022-09-14

## 2022-09-14 RX ORDER — ONDANSETRON 2 MG/ML
4 INJECTION INTRAMUSCULAR; INTRAVENOUS ONCE
Status: DISCONTINUED | OUTPATIENT
Start: 2022-09-14 | End: 2022-09-14 | Stop reason: HOSPADM

## 2022-09-14 RX ORDER — PROPOFOL 10 MG/ML
VIAL (ML) INTRAVENOUS
Status: DISCONTINUED | OUTPATIENT
Start: 2022-09-14 | End: 2022-09-14 | Stop reason: HOSPADM

## 2022-09-14 RX ORDER — ONDANSETRON 2 MG/ML
4 INJECTION INTRAMUSCULAR; INTRAVENOUS
Status: DISCONTINUED | OUTPATIENT
Start: 2022-09-14 | End: 2022-09-14 | Stop reason: HOSPADM

## 2022-09-14 RX ORDER — SODIUM CHLORIDE 0.9 % (FLUSH) 0.9 %
5-40 SYRINGE (ML) INJECTION AS NEEDED
Status: CANCELLED | OUTPATIENT
Start: 2022-09-14

## 2022-09-14 RX ORDER — GABAPENTIN 300 MG/1
300 CAPSULE ORAL ONCE
Status: DISCONTINUED | OUTPATIENT
Start: 2022-09-15 | End: 2022-09-14

## 2022-09-14 RX ORDER — FENTANYL CITRATE 50 UG/ML
50 INJECTION, SOLUTION INTRAMUSCULAR; INTRAVENOUS AS NEEDED
Status: DISCONTINUED | OUTPATIENT
Start: 2022-09-14 | End: 2022-09-14 | Stop reason: HOSPADM

## 2022-09-14 RX ORDER — FENTANYL CITRATE 50 UG/ML
50 INJECTION, SOLUTION INTRAMUSCULAR; INTRAVENOUS
Status: DISCONTINUED | OUTPATIENT
Start: 2022-09-14 | End: 2022-09-14 | Stop reason: HOSPADM

## 2022-09-14 RX ORDER — ALBUTEROL SULFATE 0.83 MG/ML
2.5 SOLUTION RESPIRATORY (INHALATION)
Status: DISCONTINUED | OUTPATIENT
Start: 2022-09-14 | End: 2022-09-14 | Stop reason: HOSPADM

## 2022-09-14 RX ORDER — CLINDAMYCIN PHOSPHATE 900 MG/50ML
900 INJECTION INTRAVENOUS EVERY 8 HOURS
Status: CANCELLED | OUTPATIENT
Start: 2022-09-14 | End: 2022-09-15

## 2022-09-14 RX ORDER — BUPIVACAINE HYDROCHLORIDE 5 MG/ML
INJECTION, SOLUTION EPIDURAL; INTRACAUDAL
Status: SHIPPED | OUTPATIENT
Start: 2022-09-14 | End: 2022-09-14

## 2022-09-14 RX ORDER — NALOXONE HYDROCHLORIDE 0.4 MG/ML
0.2 INJECTION, SOLUTION INTRAMUSCULAR; INTRAVENOUS; SUBCUTANEOUS AS NEEDED
Status: DISCONTINUED | OUTPATIENT
Start: 2022-09-14 | End: 2022-09-14 | Stop reason: HOSPADM

## 2022-09-14 RX ORDER — DEXAMETHASONE SODIUM PHOSPHATE 4 MG/ML
INJECTION, SOLUTION INTRA-ARTICULAR; INTRALESIONAL; INTRAMUSCULAR; INTRAVENOUS; SOFT TISSUE
Status: SHIPPED | OUTPATIENT
Start: 2022-09-14 | End: 2022-09-14

## 2022-09-14 RX ORDER — SODIUM CHLORIDE 0.9 % (FLUSH) 0.9 %
5-40 SYRINGE (ML) INJECTION EVERY 8 HOURS
Status: DISCONTINUED | OUTPATIENT
Start: 2022-09-14 | End: 2022-09-14 | Stop reason: HOSPADM

## 2022-09-14 RX ORDER — BUPIVACAINE HYDROCHLORIDE 2.5 MG/ML
INJECTION, SOLUTION EPIDURAL; INFILTRATION; INTRACAUDAL AS NEEDED
Status: DISCONTINUED | OUTPATIENT
Start: 2022-09-14 | End: 2022-09-14 | Stop reason: HOSPADM

## 2022-09-14 RX ORDER — SODIUM CHLORIDE, SODIUM LACTATE, POTASSIUM CHLORIDE, CALCIUM CHLORIDE 600; 310; 30; 20 MG/100ML; MG/100ML; MG/100ML; MG/100ML
25 INJECTION, SOLUTION INTRAVENOUS CONTINUOUS
Status: DISCONTINUED | OUTPATIENT
Start: 2022-09-14 | End: 2022-09-14 | Stop reason: HOSPADM

## 2022-09-14 RX ORDER — SENNOSIDES 8.6 MG/1
1 TABLET ORAL 2 TIMES DAILY
Status: CANCELLED | OUTPATIENT
Start: 2022-09-14

## 2022-09-14 RX ORDER — ACETAMINOPHEN 325 MG/1
650 TABLET ORAL
Status: CANCELLED | OUTPATIENT
Start: 2022-09-14

## 2022-09-14 RX ORDER — BUPIVACAINE HYDROCHLORIDE 7.5 MG/ML
INJECTION, SOLUTION EPIDURAL; RETROBULBAR
Status: SHIPPED | OUTPATIENT
Start: 2022-09-14 | End: 2022-09-14

## 2022-09-14 RX ORDER — CELECOXIB 200 MG/1
400 CAPSULE ORAL
Status: COMPLETED | OUTPATIENT
Start: 2022-09-14 | End: 2022-09-14

## 2022-09-14 RX ORDER — MIDAZOLAM HYDROCHLORIDE 1 MG/ML
INJECTION, SOLUTION INTRAMUSCULAR; INTRAVENOUS
Status: SHIPPED | OUTPATIENT
Start: 2022-09-14 | End: 2022-09-14

## 2022-09-14 RX ORDER — ACETAMINOPHEN 500 MG
1000 TABLET ORAL ONCE
Status: DISCONTINUED | OUTPATIENT
Start: 2022-09-15 | End: 2022-09-14 | Stop reason: HOSPADM

## 2022-09-14 RX ORDER — DIPHENHYDRAMINE HYDROCHLORIDE 50 MG/ML
12.5 INJECTION, SOLUTION INTRAMUSCULAR; INTRAVENOUS
Status: DISCONTINUED | OUTPATIENT
Start: 2022-09-14 | End: 2022-09-14 | Stop reason: HOSPADM

## 2022-09-14 RX ORDER — ASPIRIN 325 MG
325 TABLET, DELAYED RELEASE (ENTERIC COATED) ORAL 2 TIMES DAILY
Status: CANCELLED | OUTPATIENT
Start: 2022-09-15

## 2022-09-14 RX ORDER — KETOROLAC TROMETHAMINE 30 MG/ML
15 INJECTION, SOLUTION INTRAMUSCULAR; INTRAVENOUS
Status: CANCELLED | OUTPATIENT
Start: 2022-09-14 | End: 2022-09-15

## 2022-09-14 RX ORDER — ONDANSETRON 2 MG/ML
INJECTION INTRAMUSCULAR; INTRAVENOUS AS NEEDED
Status: DISCONTINUED | OUTPATIENT
Start: 2022-09-14 | End: 2022-09-14 | Stop reason: HOSPADM

## 2022-09-14 RX ORDER — DIPHENHYDRAMINE HYDROCHLORIDE 50 MG/ML
12.5 INJECTION, SOLUTION INTRAMUSCULAR; INTRAVENOUS
Status: CANCELLED | OUTPATIENT
Start: 2022-09-14

## 2022-09-14 RX ORDER — ACETAMINOPHEN 325 MG/1
325 TABLET ORAL
COMMUNITY

## 2022-09-14 RX ORDER — OXYCODONE AND ACETAMINOPHEN 10; 325 MG/1; MG/1
1 TABLET ORAL
Status: DISCONTINUED | OUTPATIENT
Start: 2022-09-14 | End: 2022-09-14 | Stop reason: HOSPADM

## 2022-09-14 RX ORDER — OXYCODONE AND ACETAMINOPHEN 5; 325 MG/1; MG/1
2 TABLET ORAL
Status: DISCONTINUED | OUTPATIENT
Start: 2022-09-14 | End: 2022-09-14 | Stop reason: HOSPADM

## 2022-09-14 RX ORDER — NALOXONE HYDROCHLORIDE 0.4 MG/ML
0.4 INJECTION, SOLUTION INTRAMUSCULAR; INTRAVENOUS; SUBCUTANEOUS AS NEEDED
Status: CANCELLED | OUTPATIENT
Start: 2022-09-14

## 2022-09-14 RX ORDER — CLINDAMYCIN HYDROCHLORIDE 300 MG/1
300 CAPSULE ORAL EVERY 6 HOURS
Qty: 28 CAPSULE | Refills: 0 | Status: SHIPPED | OUTPATIENT
Start: 2022-09-14 | End: 2022-09-21

## 2022-09-14 RX ADMIN — GABAPENTIN 300 MG: 300 CAPSULE ORAL at 07:19

## 2022-09-14 RX ADMIN — OXYCODONE AND ACETAMINOPHEN 2 TABLET: 5; 325 TABLET ORAL at 14:07

## 2022-09-14 RX ADMIN — ONDANSETRON HYDROCHLORIDE 4 MG: 2 INJECTION, SOLUTION INTRAMUSCULAR; INTRAVENOUS at 09:23

## 2022-09-14 RX ADMIN — MIDAZOLAM HYDROCHLORIDE 4 MG: 2 INJECTION, SOLUTION INTRAMUSCULAR; INTRAVENOUS at 08:18

## 2022-09-14 RX ADMIN — SODIUM CHLORIDE, POTASSIUM CHLORIDE, SODIUM LACTATE AND CALCIUM CHLORIDE 25 ML/HR: 600; 310; 30; 20 INJECTION, SOLUTION INTRAVENOUS at 07:32

## 2022-09-14 RX ADMIN — BUPIVACAINE HYDROCHLORIDE 25 ML: 5 INJECTION, SOLUTION EPIDURAL; INTRACAUDAL; PERINEURAL at 08:23

## 2022-09-14 RX ADMIN — BUPIVACAINE HYDROCHLORIDE 15 MG: 7.5 INJECTION, SOLUTION EPIDURAL; RETROBULBAR at 09:12

## 2022-09-14 RX ADMIN — PROPOFOL 29.73 MCG/KG/MIN: 10 INJECTION, EMULSION INTRAVENOUS at 09:15

## 2022-09-14 RX ADMIN — VANCOMYCIN HYDROCHLORIDE 1500 MG: 1.5 INJECTION, POWDER, LYOPHILIZED, FOR SOLUTION INTRAVENOUS at 07:25

## 2022-09-14 RX ADMIN — CELECOXIB 400 MG: 200 CAPSULE ORAL at 07:19

## 2022-09-14 RX ADMIN — MIDAZOLAM HYDROCHLORIDE 2 MG: 2 INJECTION, SOLUTION INTRAMUSCULAR; INTRAVENOUS at 09:27

## 2022-09-14 RX ADMIN — TRANEXAMIC ACID 1 G: 1 INJECTION, SOLUTION INTRAVENOUS at 09:23

## 2022-09-14 RX ADMIN — DEXAMETHASONE SODIUM PHOSPHATE 4 MG: 4 INJECTION, SOLUTION INTRAMUSCULAR; INTRAVENOUS at 08:23

## 2022-09-14 NOTE — OP NOTES
Operative Note    Patient: Bobby Benito MRN: [id number]  Surgery Date: 9/14/2022  [unfilled]          Procedure  Primary Surgeon    RIGHT TKA [facility]  Felix Stock MD    * Panel 2 does not exist *  * Panel 2 does not exist *    * Panel 3 does not exist *  * Panel 3 does not exist *     Surgeon(s) and Role:     * Felix Stock MD - Primary    Other OR Staff/Assistants:  Circ-1: Sharon Charles  Scrub Tech-1: Robbin Jarvis  Scrub Tech-2: Ruby Blas  Surg Asst-1: Mima Velásquez    1st Assistant Tasks:  Closing    Pre-operative Diagnosis:  Osteoarthritis of right knee, unspecified osteoarthritis type [M17.11]    Post-operative Diagnosis: same as preop diagnosis    Anesthesia Type: Spinal     Findings: djd    Complications: No    EBL: 50 cc    Specimens: None    Implants       Implant    Cement [facility] Hv R+G 40gm -- Palacos R+G [id number] - [id number] - Implanted   (Right) Knee      Inventory item: CEMENT E [facility] HV R+G 40GM -- PALACOS R+G [id number] Model/Cat number: [id number]    : [employer] Lot number: [id number]      As of 9/14/2022       Status: Implanted                      Baseplate Tib Sz 8 Rot Platfrm Co Chrom Molybdenum Ti [employer] - [id number] - Implanted   (Right) Knee      Inventory item: BASEPLATE TIB SZ 8 ROT PLATFRM CO CHROM MOLYBDENUM TI ALLY Model/Cat number: [id number]    : [employer]_WD Lot number: [id number]    Device identifier: [id number] Device identifier type: GS1      GUDID Information       Request status Successful        Brand name: ATTUNE Version/Model: 1506-    Company name: Andreia Atkinson (AY[employer]) MRI safety info as of 9/14/22: Labeling does not contain MRI Safety Information    Contains dry or latex rubber: No      GMDN P.T. name: Uncoated knee tibia prosthesis, metallic                As of 9/14/2022       Status: Implanted                      Insert Tib Sz 8 Thk5mm Knee Post Stbl Rot Platfrm Attune - [id number] - Implanted   (Right) Knee      Inventory item: INSERT TIB SZ 8 THK5MM KNEE POST STBL ROT PLATFRM ATTUNE Model/Cat number: [id number]    : [employer] ORTH[employer]ED[employer]_PanX Lot number: [id number]    Device identifier: [id number] Device identifier type: GS1      GUDID Information       Request status Successful        Brand name: [employer] Software Version/Model: 2425-    Company name: Dickie Opitz (AY[employer]) MRI safety info as of 9/14/22: Labeling does not contain MRI Safety Information    Contains dry or latex rubber: No      GMDN P.T. name: Tibial insert                As of 9/14/2022       Status: Implanted                      Component Fem Sz 8 R Knee Post Stbl Federico Attune - [id number] - Implanted   (Right) Knee      Inventory item: COMPONENT FEM SZ 8 R KNEE POST STBL FEDERICO ATTUNE Model/Cat number: [id number]    : [employer]_PanX Lot number: [id number]    Device identifier: [id number] Device identifier type: StudentboxDID Information       Request status Successful        Brand name: [employer] Software Version/Model: 1504-    Company name: Dickie Opitz (AY[employer]) MRI safety info as of 9/14/22: Labeling does not contain MRI Safety Information    Contains dry or latex rubber: No      GMDN P.T. name: Uncoated knee femur prosthesis, metallic                As of 9/14/2022       Status: Implanted                      Component Pat Pea56rl Polyeth Dome Federico Medialized Attune - [id number] - Implanted   (Right) Knee      Inventory item: COMPONENT PAT ZUH15EP POLYETH DOME FEDERICO MEDIALIZED ATTUNE Model/Cat number: [id number]    : [employer] ORTHOPEDICS_PanX Lot number: [id number]    Device identifier: [id number] Device identifier type: GS1      GUDID Information       Request status Successful        Brand name: CoreObjects Software Version/Model: 1518-    Company name: Dickie Opitz [employer]) MRI safety info as of 9/14/22: Labeling does not contain MRI Safety Information    Contains dry or latex rubber: No CALLIE CRABTREE name: Polyethylene patella prosthesis                As of 9/14/2022       Status: Implanted                               Operative procedure: Total knee replacement    OPERATIVE PROCEDURE:  Please note the first assistant role was to help in patient positioning and draping of the extremity in a sterile fashion. Also during the surgery the assistant's responsibilities included but not limited to extremity positioning during critical portions of the surgery. Assisting in using and placement of retractors during surgery. Lower extremity was prepped and draped in a sterile fashion. After adequate anesthesia was given, the patient was placed in a well-padded supine position. Subvastus arthrotomy from the tibial tubercle to the superior pole of the patella was made. Knee was hyperflexed. Intramedullary reaming of distal femur and proximal tibia was performed. 10 mm of distal femur was cut. Anterior-posterior sizing guide was used. Anterior, posterior, chamfer cuts, and box cuts were made next. Proximal tibial cut and preparation performed. Posterior osteophyte meniscal remnants were removed, and also patella was everted. Free-hand cut of the patella was made. Trial components were placed. The patient was found to have excellent range of motion and stability with all trial components. All the trial components removed. Copious irrigation performed. Distal femur, proximal tibia, and patella were impacted in place. Excessive cement was removed. After the cement was hard, Subvastus arthrotomy closed with Vicryl stitch. Compressive dressing was applied. The patient was taken to PACU in stable condition. Please note due to the patient's BMI of greater than 30 significant surgical effort was required compared to the standard patient with a BMI lower than 30. Surgical time increased approximately 30% from the normal surgical time due to the patient's high BMI.   Because of the high BMI patient's knee would be considered a complex total knee replacement rather than a standard total knee replacement.       Trinidad Auguste MD

## 2022-09-14 NOTE — DISCHARGE INSTRUCTIONS
TOTAL KNEE REPLACEMENT DISCHARGE INFORMATION    You have undergone a Total Knee Replacement. The following list is to provide you with some expectations over the next week upon your discharge from the hospital.     Please begin Aspirin 325mg every 12 hours (twice daily) starting tomorrow as directed until Dr. Cheikh Marin instructs you to discontinue it. If you are not sure which blood thinner to take please contact Dr. Foster [place] office next business day for clarification. Please be sure to continue your thigh-high compression stockings on both sides until instructed to discontinue them. Over the course of the next week, you should continue thigh high stockings on the operative leg, DO NOT GET THE INCISION WET until instructed to do so. Please make sure the stockings on the operative leg are pulled up all the way to the thigh to prevent any creases which may result in abrasions or creases in the skin. If the stockings are creating creases resulting in abrasions or blistering on the operative leg please remove the stockings. You may take the stockings off on the nonoperative leg once you arrive home. You may notice some bruising on your thigh and it may extend all the way to the ankles. That is perfectly normal early on. You may experience a clicking noise in your knee and that is normal because of the artificial knee. It is important to remember if you have any surgical procedure including dental procedures which may result in bleeding that an antibiotic 1 hour before the procedure will be required. Please let the provider performing the procedure know that you have artificial joint. If an antibiotic is not given by them please call our office and give us at least 5 business days to get you the appropriate antibiotics if needed. This rule applies indefinitely. If an Ace wrap is placed on your knee you may remove the Ace wrap only 48 hours after your surgery. We will leave the stockings on.   During the course of your  over the next week, should you experience fevers of 101.5 F, a white drainage from the incision, extreme redness around the incision, or the incision begins to have a pungent smell; Please call our office or page Dr. Cheikh Marin whose numbers are provided in your discharge paperwork. To Page Dr. Cheikh Marin please call [phone] and dial 0. Have the  page whomever is on call for Orthopedics. These are signs of infection and it should be addressed immediately. Please do not drive until instructed to do so. If you need a refill on pain medication please allow at least 2 business days notice for any refills. Immediate refill request may not be possible. Medication refill requests will not be addressed during non-business hours. Please do not page the on-call provider for pain medication refills after hours. It is very important for you to begin your Outpatient Physical Therapy within a couple days of the day of your discharge and your appointment should have been set up. If your physical therapy has not been set up please call our office the next business day for assistance. Details provided in a separate sheet. Remove ace wrap in 48 hrs after surgery but keep stockings on. You may remove the stocking and keep the stocking off on the nonoperative leg. Finish all antibiotics, start the antibiotics as soon as you go home if you have prescribed antibiotics. 14.  You should perform your daily home exercises at least 4 times a day 30 minutes each time. Perform foot pumps on both feet at least 10 times every 15 minutes while awake. This helps prevent swelling in the leg and can help prevent blood clots in the leg. On the operative leg if you have significant swelling you can also lay down flat and put 3 pillows under the heel so the heel is above the heart level and then perform foot pumps 4 times a day for 10 minutes to help bring the swelling down.   15.  Do not place anything under your knee while sleeping at night. Elevate your heel so your  is straight while sleeping at night. 16.  Perform deep breathing exercises 10 times every hour while awake. 17.  If you had a nerve block and you are not having pain the day of the surgery, at nighttime it is okay to take 1 pain medication before going to sleep to help prevent excruciating pain when the nerve block wears off. 18.  You may be given an ice pack machine use that to help prevent swelling. Do not apply heat to the incision area. 19.  While you are awake at least 10 times every 30 minutes move your foot up and down as if you are pumping gas from both feet to help prevent swelling and to promote blood circulation in the calf. 20.  If you develop sudden onset of shortness of breath or severe calf pain please go to closest emergency room. 21. Your pain medicine is a Narcotic and may cause constipation. You may take an over the counter stool softener while taking pain medicine. 25.  You will get surveys either via text message or email after your surgery on a periodic basis. Please participate in the surveys as it helps to track your progress. ICE THERAPY WRAP:    Keep ice therapy wrap on when resting. DO not wear when moving or walking. Ice packs are reusable. Ice Therapy wrap holds two ice packs at a time. Things to watch for:             Increased swelling of the surgical site             Spreading of redness around the incision site             Drainage of pus from the incision site             Developing a fever of 101.5 °F or higher             If any of these symptoms occur you have any questions please contact our office at [phone]. If you need to talk to Dr. Holland Lenz or his staff after hours please call the office and have the on-call service get in touch with the provider on call that day. Please note pain medications are not refilled after hours or on weekends.   If Dr. Holland Lenz or his staff do not call you back within 30 minutes. Please tell the  to try again. Phone: [phone]  www. [employer]

## 2022-09-14 NOTE — ANESTHESIA POSTPROCEDURE EVALUATION
Procedure(s):  RIGHT TKA (STEMMABLE). spinal, general - backup    Anesthesia Post Evaluation      Multimodal analgesia: multimodal analgesia used between 6 hours prior to anesthesia start to PACU discharge  Patient location during evaluation: bedside  Patient participation: complete - patient cannot participate  Level of consciousness: awake and alert  Pain management: adequate  Airway patency: patent  Anesthetic complications: no  Cardiovascular status: stable  Respiratory status: acceptable  Hydration status: acceptable  Comments: DC when criteria met. Post anesthesia nausea and vomiting:  none  Final Post Anesthesia Temperature Assessment:  Normothermia (36.0-37.5 degrees C)      INITIAL Post-op Vital signs: No vitals data found for the desired time range.

## 2022-09-14 NOTE — PERIOP NOTES
Spoke with Julio Rosario regarding antibiotic for after surgery - states she will send the antibiotic to pts pharmacy for him to  today. Pt aware.

## 2022-09-14 NOTE — ANESTHESIA PROCEDURE NOTES
Peripheral Block    Start time: 9/14/2022 8:18 AM  End time: 9/14/2022 8:26 AM  Performed by: Charlie Irby CRNA  Authorized by: Charlie Irby CRNA       Pre-procedure: Indications: at surgeon's request and post-op pain management    Preanesthetic Checklist: patient identified, risks and benefits discussed, site marked, timeout performed, anesthesia consent given, patient being monitored and fire risk safety assessment completed and verbalized    Timeout Time: 08:18 EDT Venessa Lipoma RN)      Block Type:   Block Type:   Adductor canal block  Laterality:  Right  Monitoring:  Responsive to questions, standard ASA monitoring, continuous pulse ox, oxygen, frequent vital sign checks and heart rate  Injection Technique:  Single shot  Procedures: ultrasound guided    Patient Position: supine  Prep: chlorhexidine    Location:  Mid thigh  Needle Type:  Ultraplex  Needle Gauge:  20 G  Needle Localization:  Ultrasound guidance  Medication Injected:  Midazolam (VERSED) injection - IntraVENous   4 mg - 9/14/2022 8:18:00 AM  bupivacaine (PF) (MARCAINE) 0.5% injection - Peripheral Nerve Block   25 mL - 9/14/2022 8:23:00 AM  dexamethasone (DECADRON) 4 mg/mL injection - Peripheral Nerve Block   4 mg - 9/14/2022 8:23:00 AM  Med Admin Time: 9/14/2022 8:23 AM    Assessment:  Number of attempts:  1  Injection Assessment:  Incremental injection every 5 mL, local visualized surrounding nerve on ultrasound, negative aspiration for blood, no paresthesia, no intravascular symptoms and ultrasound image on chart  Patient tolerance:  Patient tolerated the procedure well with no immediate complications

## 2022-09-14 NOTE — INTERVAL H&P NOTE
Update History & Physical    The Patient's History and Physical was reviewed with the patient. The patient was examined. There was no change. The surgical site was confirmed by the patient and me. Patient understands and wants to proceed with the procedure. If applicable, I have discussed with the patient / power of  the rationale for blood component transfusion; its benefits in treating or preventing fatigue, organ damage, or death; and its risk which includes mild transfusion reactions, rare risk of blood borne infection, or more serious but rare reactions. I have discussed the alternatives to transfusion, including the risk and consequences of not receiving transfusion. The patient / Kody Carranzaots of  had an opportunity to ask questions and had agreed to proceed with transfusion of blood components. Plan:  The risk, benefits, expected outcome, and alternative to the recommended procedure have been discussed with the patient.       Electronically signed by SUNNY Obando on 9/14/2022 at 7:36 AM

## 2022-09-14 NOTE — PROGRESS NOTES
Problem: Mobility Impaired (Adult and Pediatric)  Goal: *Acute Goals and Plan of Care (Insert Text)  Description: Pt ambulates with MOD (I) and navigates stairs with MOD (I) . PLOF: Community ambulator, no AD, (I) ADLs    Outcome: Resolved/Met     Problem: Patient Education: Go to Patient Education Activity  Goal: Patient/Family Education  Outcome: Resolved/Met   PHYSICAL THERAPY EVALUATION AND DISCHARGE    Patient: Charis Levi (9 y.o. male)  Date: 2022   Start Time: 1336   Stop Time: 0289  $$ Initial PT Evaluation: Low Complex 21 Min  $$ Gait Trainin-22 mins    Primary Diagnosis: Osteoarthritis of right knee, unspecified osteoarthritis type [M17.11]  OA (osteoarthritis) of knee [M17.10]  Procedure(s) (LRB):  RIGHT TKA (STEMMABLE) (Right) Day of Surgery   Precautions:  WBAT    ASSESSMENT :  Based on the objective data described below, the patient presents s/p R TKA and he is WBAT. He is able to ambulate with a RW/no AD with MOD (I) and he is able to navigate stairs with MOD (I). He is educated on a HEP and tolerates well. HE can return home with outpatient P.T. Patient does not require further skilled intervention at this level of care. PLAN :  Recommendations and Planned Interventions:   No formal PT needs identified at this time. Discharge Recommendations: Outpatient  AM-PAC:   Further Equipment Recommendations for Discharge: N/A     SUBJECTIVE:   Patient states I think I'm ready.     OBJECTIVE DATA SUMMARY:     Past Medical History:   Diagnosis Date    Skin cancer    History reviewed. No pertinent surgical history.   Barriers to Learning/Limitations: None  Compensate with: N/A  Home Situation:   Home Situation  Home Environment: Private residence  # Steps to Enter: 3  Rails to Enter: Yes  Hand Rails : Bilateral  One/Two Story Residence: One story  Living Alone: Yes  Support Systems: Other Family Member(s), Friend/Neighbor  Patient Expects to be Discharged to[de-identified] Home with outpatient services  Current DME Used/Available at Home: Walker, rolling, Cane, straight  Critical Behavior:  Neurologic State: Alert  Orientation Level: Oriented X4    Skin Integrity: Incision (comment)  Skin Integumentary  Skin Integrity: Incision (comment)     Strength:    Strength: Generally decreased, functional (R knee 4/5, SLR 1245, 3.5 hours after spinal)    Tone & Sensation:   Tone: Normal  Sensation: Intact    Range Of Motion:   AROM: Generally decreased, functional (R knee 4-73)  PROM: Generally decreased, functional (R knee 2-83)    Posture:  Posture (WDL): Within defined limits      Functional Mobility:  Bed Mobility:  Rolling: Modified independent  Supine to Sit: Modified independent  Sit to Supine: Modified independent  Scooting: Modified independent  Transfers:  Sit to Stand: Modified independent  Stand to Sit: Modified independent    Balance:   Sitting: Intact  Standing: Intact  Ambulation/Gait Training:  Distance (ft): 1005 Feet (ft)  Assistive Device: Walker, rolling; Other (comment) (no AD for the last 30')  Ambulation - Level of Assistance: Modified independent     Gait Description (WDL): Exceptions to WDL  Gait Abnormalities: Other (R knee hyperextends at times)  Right Side Weight Bearing: As tolerated    Stairs:  Number of Stairs Trained: 5 (reciprocating)  Stairs - Level of Assistance: Modified independent  Rail Use: Both      AM-PAC:  24/24; Current research shows that an AM-PAC score of 17 or less is typically not associated with a discharge to the patient's home setting, whereas a score of 18 or greater is typically associated with a discharge to the patient's home setting. Today's TX:   Pt is able to ambulate with MOD (I)  with a RW/no AD. He is able to navigate stairs with MOD (I). He is educated on a HEP and states understanding. Pain:  Pain level pre-treatment: 0/10   Pain level post-treatment: 5/10  Pain Location: R knee  Pain Intervention(s): Medication (see MAR);  Rest, Ice, Repositioning   Response to intervention: Nurse notified, See doc flow    Activity Tolerance:   Excellent  Please refer to the flowsheet for vital signs taken during this treatment. After treatment:   []         Patient left in no apparent distress sitting up in chair  [x]         Patient left in no apparent distress in bed  []         Call bell left within reach  [x]         Nursing notified  []         Caregiver present  []         Bed alarm activated  []         SCDs applied    COMMUNICATION/EDUCATION:   [x]         Role of Physical Therapy in the acute care setting. [x]         Fall prevention education was provided and the patient/caregiver indicated understanding. [x]         Patient/family have participated as able in goal setting and plan of care. [x]         Patient/family agree to work toward stated goals and plan of care. []         Patient understands intent and goals of therapy, but is neutral about his/her participation. []         Patient is unable to participate in goal setting/plan of care: ongoing with therapy staff.  []         Other:     Thank you for this referral.  Ildefonso Alanis, PT, DPT   Time Calculation: 28 mins

## 2022-09-14 NOTE — ANESTHESIA PREPROCEDURE EVALUATION
Relevant Problems   No relevant active problems       Anesthetic History   No history of anesthetic complications            Review of Systems / Medical History  Patient summary reviewed, nursing notes reviewed and pertinent labs reviewed    Pulmonary  Within defined limits                 Neuro/Psych   Within defined limits           Cardiovascular  Within defined limits                Exercise tolerance: >4 METS     GI/Hepatic/Renal  Within defined limits              Endo/Other        Obesity     Other Findings              Physical Exam    Airway  Mallampati: II  TM Distance: 4 - 6 cm  Neck ROM: normal range of motion   Mouth opening: Normal     Cardiovascular  Regular rate and rhythm,  S1 and S2 normal,  no murmur, click, rub, or gallop  Rhythm: regular  Rate: normal         Dental  No notable dental hx       Pulmonary  Breath sounds clear to auscultation               Abdominal  GI exam deferred       Other Findings            Anesthetic Plan    ASA: 2  Anesthesia type: spinal and general - backup      Post-op pain plan if not by surgeon: peripheral nerve block single    Induction: Intravenous  Anesthetic plan and risks discussed with: Patient

## 2022-09-14 NOTE — ANESTHESIA PROCEDURE NOTES
Spinal Block    Start time: 9/14/2022 9:02 AM  End time: 9/14/2022 9:12 AM  Performed by: Mary Garza CRNA  Authorized by: Mary Garza CRNA     Pre-procedure: Indications: at surgeon's request and primary anesthetic  Preanesthetic Checklist: patient identified, risks and benefits discussed, anesthesia consent, site marked, patient being monitored, timeout performed and fire risk safety assessment completed and verbalized    Timeout Time: 09:02 EDT Keith Casas RN)      Spinal Block:   Patient Position:  Seated  Prep Region:  Lumbar  Prep: Betadine      Location:  L3-4  Technique:  Single shot  Local: bupivacaine (PF) (MARCAINE) 0.75 % (7.5 mg/mL) Intrathecal - Intrathecal   15 mg - 9/14/2022 9:12:00 AM  Local Dose (mL):  2  Med Admin Time: 9/14/2022 9:12 AM    Needle:   Needle Type:   Shailesh  Needle Gauge:  22 G  Attempts:  1      Events: CSF confirmed, no blood with aspiration and no paresthesia        Assessment:  Insertion:  Uncomplicated  Patient tolerance:  Patient tolerated the procedure well with no immediate complications

## 2022-09-15 ENCOUNTER — ANESTHESIA (OUTPATIENT)
Dept: SURGERY | Age: 67
End: 2022-09-15
Payer: MEDICARE

## 2022-09-16 ENCOUNTER — OFFICE VISIT (OUTPATIENT)
Dept: ORTHOPEDIC SURGERY | Age: 67
End: 2022-09-16
Payer: MEDICARE

## 2022-09-16 DIAGNOSIS — M25.561 RIGHT KNEE PAIN, UNSPECIFIED CHRONICITY: Primary | ICD-10-CM

## 2022-09-16 PROCEDURE — 99024 POSTOP FOLLOW-UP VISIT: CPT | Performed by: ORTHOPAEDIC SURGERY

## 2022-09-16 NOTE — PROGRESS NOTES
Name: Eyal Tony    :      Service Dept: [address] [facility]    Chief Complaint   Patient presents with    Surgical Follow-up    Knee Pain        There were no vitals taken for this visit. Allergies   Allergen Reactions    Amoxicillin Hives    Other Medication Other (comments)     No narcotics/hx addiction    Penicillins Hives        Current Outpatient Medications   Medication Sig Dispense Refill    acetaminophen (TylenoL) 325 mg tablet Take 325 mg by mouth every four (4) hours as needed for Pain. clindamycin (CLEOCIN) 300 mg capsule Take 1 Capsule by mouth every six (6) hours for 7 days. 28 Capsule 0    oxyCODONE-acetaminophen (Percocet) 5-325 mg per tablet Take 1 Tablet by mouth every four to six (4-6) hours as needed for Pain for up to 14 days. Max Daily Amount: 6 Tablets. 30 Tablet 0    ondansetron (ZOFRAN ODT) 4 mg disintegrating tablet Take 1 Tablet by mouth every eight (8) hours as needed for Nausea, Vomiting or Nausea or Vomiting for up to 20 doses. 20 Tablet 0    omeprazole (PRILOSEC OTC) 20 mg tablet Take 20 mg by mouth daily. Indications: gastroesophageal reflux disease        Patient Active Problem List   Diagnosis Code    Cellulitis of right leg L03.115    Infected wound T14. 8XXA, L08.9    Tobacco abuse Z72.0    OA (osteoarthritis) of knee M17.10      History reviewed. No pertinent family history. Social History     Socioeconomic History    Marital status: SINGLE   Tobacco Use    Smoking status: Some Days    Smokeless tobacco: Current      History reviewed. No pertinent surgical history. Past Medical History:   Diagnosis Date    Skin cancer         I have reviewed and agree with [address] [place] [address] Rohini and ODILIA and intake form in chart and the record furthermore I have reviewed prior medical record(s) regarding this patients care during this appointment.      Review of Systems:   Patient is a pleasant appearing individual, appropriately dressed, well hydrated, well nourished, who is alert, appropriately oriented for age, and in no acute distress with a walker gait and normal affect who does not appear to be in any significant pain. Physical Exam:  Right knee - Neurovascularly intact with good cap refill, full range of motion and full strength, well healed incision noted, no swelling, no erythema, no instability. Left knee - Decrease range of motion with flexion, Some crepitation, Grossly neurovascularly intact, Good cap refill, No skin lesion, Moderate swelling, No gross instability, Some quadriceps weakness     Encounter Diagnoses     ICD-10-CM ICD-9-CM   1. Right knee pain, unspecified chronicity  M25.561 719.46       HPI:  The patient is here with a chief complaint of right knee pain, status post right total knee replacement. Surgery was on 09/14/2022. Doing well, has no complaints, just a little bit of soreness. Assessment/Plan:  Plan at this point, activities as tolerated started. His knee replacement was on September 14. He had a little bit of spotting and blood. We will see the patient back in 1 week for a virtual appointment and go from there. As part of continued conservative pain management options the patient was advised to utilize Tylenol or OTC NSAIDS as long as it is not medically contraindicated. Return to Office: Follow-up and Dispositions    Return in about 1 week (around 9/23/2022) for VIRTUAL VISIT, Tin Allison. Scribed by Giovany Siu as dictated by Laury Whitlock. Sandra Hall MD.  Documentation True and Accepted Vasiliy Hall MD

## 2022-09-16 NOTE — LETTER
9/19/2022    Patient: Alirio Stephen   YOB: 1955   Date of Visit: 9/16/2022     Niko Stuart, [facility] [address] [place] [address] [address] [facility] [place] [address]  Via Fax: [phone]    Dear Niko Stuart MD,      Thank you for referring Mr. Corona Willams to [address] [place] [address] for evaluation. My notes for this consultation are attached. If you have questions, please do not hesitate to call me. I look forward to following your patient along with you.       Sincerely,    Brittany Joyner MD

## 2022-09-16 NOTE — PATIENT INSTRUCTIONS
Knee Pain or Injury: Care Instructions  Your Care Instructions     Injuries are a common cause of knee problems. Sudden (acute) injuries may be caused by a direct blow to the knee. They can also be caused by abnormal twisting, bending, or falling on the knee. Pain, bruising, or swelling may be severe, and may start within minutes of the injury. Overuse is another cause of knee pain. Other causes are climbing stairs, kneeling, and other activities that use the knee. Everyday wear and tear, especially as you get older, also can cause knee pain. Rest, along with home treatment, often relieves pain and allows your knee to heal. If you have a serious knee injury, you may need tests and treatment. Follow-up care is a key part of your treatment and safety. Be sure to make and go to all appointments, and call your doctor if you are having problems. It's also a good idea to know your test results and keep a list of the medicines you take. How can you care for yourself at home? Be safe with medicines. Read and follow all instructions on the label. If the doctor gave you a prescription medicine for pain, take it as prescribed. If you are not taking a prescription pain medicine, ask your doctor if you can take an over-the-counter medicine. Rest and protect your knee. Take a break from any activity that may cause pain. Put ice or a cold pack on your knee for 10 to 20 minutes at a time. Put a thin cloth between the ice and your skin. Prop up a sore knee on a pillow when you ice it or anytime you sit or lie down for the next 3 days. Try to keep it above the level of your heart. This will help reduce swelling. If your knee is not swollen, you can put moist heat, a heating pad, or a warm cloth on your knee. If your doctor recommends an elastic bandage, sleeve, or other type of support for your knee, wear it as directed. Follow your doctor's instructions about how much weight you can put on your leg.  Use a cane, crutches, or a walker as instructed. Follow your doctor's instructions about activity during your healing process. If you can do mild exercise, slowly increase your activity. Reach and stay at a healthy weight. Extra weight can strain the joints, especially the knees and hips, and make the pain worse. Losing even a few pounds may help. When should you call for help? Call 911 anytime you think you may need emergency care. For example, call if:    You have symptoms of a blood clot in your lung (called a pulmonary embolism). These may include:  Sudden chest pain. Trouble breathing. Coughing up blood. Call your doctor now or seek immediate medical care if:    You have severe or increasing pain. Your leg or foot turns cold or changes color. You cannot stand or put weight on your knee. Your knee looks twisted or bent out of shape. You cannot move your knee. You have signs of infection, such as: Increased pain, swelling, warmth, or redness. Red streaks leading from the knee. Pus draining from a place on your knee. A fever. You have signs of a blood clot in your leg (called a deep vein thrombosis), such as:  Pain in your calf, back of the knee, thigh, or groin. Redness and swelling in your leg or groin. Watch closely for changes in your health, and be sure to contact your doctor if:    You have tingling, weakness, or numbness in your knee. You have any new symptoms, such as swelling. You have bruises from a knee injury that last longer than 2 weeks. You do not get better as expected. Where can you learn more? Go to http://www.gray.com/  Enter K195 in the search box to learn more about \"Knee Pain or Injury: Care Instructions. \"  Current as of: July 1, 2021               Content Version: 13.2  © 2006-2022 [employer].    Care instructions adapted under license by [employer] (which disclaims liability or warranty for this information). If you have questions about a medical condition or this instruction, always ask your healthcare professional. [place] [address] any warranty or liability for your use of this information.

## 2022-09-20 NOTE — OP NOTES
Operative Note    Patient: Bobby Benito MRN: [id number]  Surgery Date: 9/14/2022  [unfilled]          Procedure  Primary Surgeon    RIGHT TKA [facility])  Felix Stock MD    * Panel 2 does not exist *  * Panel 2 does not exist *    * Panel 3 does not exist *  * Panel 3 does not exist *     Surgeon(s) and Role:     * Felix Stock MD - Primary    Other OR Staff/Assistants:  Circ-1: Meg Rom Tech-1: Robbin Jarvis  Scrub Tech-2: Ruby Blas  Surg Asst-1: Mima Velásquez    1st Assistant Tasks:  Closing    Pre-operative Diagnosis:  Osteoarthritis of right knee, unspecified osteoarthritis type [M17.11]    Post-operative Diagnosis: same as preop diagnosis    Anesthesia Type: Spinal     Findings: djd    Complications: No    EBL: 50 cc    Body mass index is 37.59 kg/m².     Specimens: None    Implants       Implant    Cement [facility] Hv R+G 40gm -- Palacos R+G [id number] - [id number] - Implanted   (Right) Knee      Inventory item: CEMENT [facility] HV R+G 40GM -- PALACOS R+G [id number] Model/Cat number: [id number]    : [employer] Lot number: [id number]      As of 9/14/2022       Status: Implanted                      Baseplate Tib Sz 8 Rot Platfrm Co Chrom Molybdenum Ti Mcarthur - [id number] - Implanted   (Right) Knee      Inventory item: BASEPLATE TIB SZ 8 ROT PLATFRM CO CHROM MOLYBDENUM TI ALLY Model/Cat number: [id number]    : [employer] Lot number: [id number]    Device identifier: [id number] Device identifier type: GS1      GUDID Information       Request status Successful        Brand name: ATTAdenios Version/Model: 1506-    Company name: Andreia Atkinson ([employer]) MRI safety info as of 9/14/22: Labeling does not contain MRI Safety Information    Contains dry or latex rubber: No      GMDN P.T. name: Uncoated knee tibia prosthesis, metallic                As of 9/14/2022       Status: Implanted                      Insert Tib Sz 8 Thk5mm Knee Post Stbl Rot Platfrm Attune - [id number] - Implanted   (Right) Knee      Inventory item: INSERT TIB SZ 8 THK5MM KNEE POST STBL ROT PLATFRM ATTUNE Model/Cat number: [id number]    : [employer] Lot number: [id number]    Device identifier: [id number] Device identifier type: GS1      GUDID Information       Request status Successful        Brand name: GC Aesthetics Version/Model: 6980-    Company name: Mikki Ramirez [employer]) MRI safety info as of 9/14/22: Labeling does not contain MRI Safety Information    Contains dry or latex rubber: No      GMDN P.T. name: Tibial insert                As of 9/14/2022       Status: Implanted                      Component Fem Sz 8 R Knee Post Stbl Joseph Attune - [id number] - Implanted   (Right) Knee      Inventory item: COMPONENT FEM SZ 8 R KNEE POST STBL JOSEPH ATTUNE Model/Cat number: [id number]    : [employer] Lot number: [id number]    Device identifier: [id number] Device identifier type: GS1      GUDID Information       Request status Successful        Brand name: GC Aesthetics Version/Model: 1504-    Company name: Mikki Ramirez [employer]) MRI safety info as of 9/14/22: Labeling does not contain MRI Safety Information    Contains dry or latex rubber: No      GMDN P.T. name: Uncoated knee femur prosthesis, metallic                As of 9/14/2022       Status: Implanted                      Component Pat Uzj43zn Polyeth Dome Joseph Medialized Attune - [id number] - Implanted   (Right) Knee      Inventory item: COMPONENT PAT NVB50MB POLYETH DOME JOSEPH MEDIALIZED ATTUNE Model/Cat number: [id number]    : [employer] Lot number: [id number]    Device identifier: [id number] Device identifier type: GS1      GUDID Information       Request status Successful        Brand name: GC Aesthetics Version/Model: 1518-    Company name: Mikki Ramirez [employer]) MRI safety info as of 9/14/22: Labeling does not contain MRI Safety Information Contains dry or latex rubber: No      GMDN P.T. name: Polyethylene patella prosthesis                As of 9/14/2022       Status: Implanted                      Knee K1 Tot Yasemin Std Federico Impl Capped Synthes - [id number] - Implanted   (Right) Knee      Inventory item: KNEE K1 TOT YASEMIN STD FEDERICO IMPL CAPPED SYNTHES Model/Cat number: [id number]    : [employer]        As of 9/14/2022       Status: Implanted                               Operative procedure: Total knee replacement    OPERATIVE PROCEDURE:  Please note the first assistant role was to help in patient positioning and draping of the extremity in a sterile fashion. Also during the surgery the assistant's responsibilities included but not limited to extremity positioning during critical portions of the surgery. Assisting in using and placement of retractors during surgery. Lower extremity was prepped and draped in a sterile fashion. After adequate anesthesia was given, the patient was placed in a well-padded supine position. Subvastus arthrotomy from the tibial tubercle to the superior pole of the patella was made. Knee was hyperflexed. Intramedullary reaming of distal femur and proximal tibia was performed. 10 mm of distal femur was cut. Anterior-posterior sizing guide was used. Anterior, posterior, chamfer cuts, and box cuts were made next. Proximal tibial cut and preparation performed. Posterior osteophyte meniscal remnants were removed, and also patella was everted. Free-hand cut of the patella was made. Trial components were placed. The patient was found to have excellent range of motion and stability with all trial components. All the trial components removed. Copious irrigation performed. Distal femur, proximal tibia, and patella were impacted in place. Excessive cement was removed. After the cement was hard, Subvastus arthrotomy closed with Vicryl stitch. Compressive dressing was applied.   The patient was taken to PACU in stable condition. Please note due to the patient's BMI of greater than 30 significant surgical effort was required compared to the standard patient with a BMI lower than 30. Surgical time increased approximately 30% from the normal surgical time due to the patient's high BMI. Because of the high BMI patient's knee would be considered a complex total knee replacement rather than a standard total knee replacement.       Sherine Meadows MD

## 2022-09-21 ENCOUNTER — TELEPHONE (OUTPATIENT)
Dept: ORTHOPEDIC SURGERY | Age: 67
End: 2022-09-21

## 2022-09-21 DIAGNOSIS — Z96.651 STATUS POST RIGHT KNEE REPLACEMENT: Primary | ICD-10-CM

## 2022-09-21 RX ORDER — OXYCODONE AND ACETAMINOPHEN 5; 325 MG/1; MG/1
1 TABLET ORAL
Qty: 30 TABLET | Refills: 0 | Status: SHIPPED | OUTPATIENT
Start: 2022-09-21 | End: 2022-10-03 | Stop reason: SDUPTHER

## 2022-09-22 ENCOUNTER — VIRTUAL VISIT (OUTPATIENT)
Dept: ORTHOPEDIC SURGERY | Age: 67
End: 2022-09-22
Payer: MEDICARE

## 2022-09-22 DIAGNOSIS — Z96.651 STATUS POST RIGHT KNEE REPLACEMENT: Primary | ICD-10-CM

## 2022-09-22 PROCEDURE — 99024 POSTOP FOLLOW-UP VISIT: CPT | Performed by: NURSE PRACTITIONER

## 2022-09-22 NOTE — PROGRESS NOTES
Subjective:      Patient presents for postop care following right TKA. Surgery was on 9/14/2022. Ambulating with a cane. Pain is controlled with current analgesics. Medication(s) being used: acetaminophen. .    Objective: There were no vitals taken for this visit. General:  alert, cooperative, no distress, appears stated age   ROM: -5/95; good quad strength on extension   Incision:   healing well, no drainage, no erythema, incision well approximated, mild swelling     Assessment:     Doing well postoperatively. Plan:     1. Continue PT. 2. Wound care/showering discussed. 3. Continue DVT prophylaxis as directed. 4. Follow up in 3 weeks to reassess progress. Celio Sullivan, who was evaluated through a synchronous (real-time) audio-video encounter, and/or his healthcare decision maker, is aware that it is a billable service, with coverage as determined by his insurance carrier. He provided verbal consent to proceed: Yes, and patient identification was verified. It was conducted pursuant to the emergency declaration under the [address] [place] [address] authority and the [employer] and [place] [employer] Act. A caregiver was present when appropriate. Ability to conduct physical exam was limited. I was in the office. The patient was at home.

## 2022-09-22 NOTE — LETTER
9/22/2022 10:20 AM    Mr. Stephanie Mendenhall  [address] [address]      Jiffy Knee Replacement: What to Expect at [address] [place] [address] Drive had a Jiffy Knee replacement. The doctor replaced the worn ends of the bones that connect to your knee (thighbone and lower leg bone) with plastic and metal parts. Your knee will continue to improve for up to a year. You will need to do weeks/months of physical rehabilitation (rehab) after a knee replacement. Rehab will help you strengthen the muscles of the knee and help you regain movement. After you recover, your artificial knee will allow you to do normal daily activities with less pain or no pain at all. You may be able to hike, dance, or ride a bike. Talk to your doctor about whether you can do more strenuous activities. Always tell your caregivers that you have an artificial knee. How long it will take to walk on your own, return to normal activities, and go back to work depends on your health and how well your rehabilitation (rehab) program goes. The better you do with your rehab exercises, the quicker you will get your strength and movement back. This care sheet gives you a general idea about how long it will take for you to recover. But each person recovers at a different pace. Follow the steps below to get better as quickly as possible. How can you care for yourself at home? Activity    You will be participating in an outpatient physical therapy program. Your goal is progress from a walker to a cane to nothing at all while walking. You should be doing your home exercises 3-4 times daily. The therapist will determine when you are ready to stop the physical therapy program.     If you require a work note, please call our office when you are ready to go back to work. You may drive when you are no longer using a walker or daytime narcotic pain medications.      Some clicking or clunking in the knee is normal.     If you have any traumas or falls, contact our office immediately. Some bruising in the leg and foot is normal.     If you experience any significant calf pain or swelling or shortness of breath, please call Dr. Savage Forward or go to the ER if it is urgent. Diet    Drink plenty of fluids (unless your doctor tells you not to). You may notice that your bowel movements are not regular right after your surgery. This is common. Try to avoid constipation and straining with bowel movements. Drinking enough fluids, taking a stool softener, and eating foods that are good sources of fiber can help you avoid constipation. If you have not had a bowel movement after a couple of days, talk to your doctor. Medicines    You should take aspirin 325 mg twice daily until you are 1 month out from surgery. If you take a prescription blood thinner, continue that as directed. If you think your pain medicine is making you sick to your stomach:  ? Take your medicine after meals (unless your doctor has told you not to). ? Take the prescribed nausea pills as directed. ? Ask your doctor for a different pain medicine. If your doctor prescribed antibiotics, take them as directed. If you require a refill on narcotic pain medication, please let us know at the time of today's appointment or give at least 2 business days for refill for future dates. Incision care    You can shower and get your incision wet with soap and water. Pat it dry and no further dressing changes will be required. You will see a clear surgical mesh tape on your knee. You may remove that tape two weeks after the surgery. If you notice any redness around the incision site or fluid from the knee incision, call Dr. Carrero Morning office immediately. Ice    For pain and swelling, put ice or a cold pack on the area for 10 to 20 minutes at a time. Put a thin cloth between the ice and your skin.  If your doctor recommended cold therapy using a portable machine, follow the instructions that came with the machine. Other instructions    Wear compression stockings if your doctor told you to. These may help to prevent blood clots. Your doctor will tell you how long you need to keep wearing the compression stockings. If you have any procedures or dental work where there may be bleeding, it is recommended that you get antibiotics to take 1 hour prior to that procedure. Please call our office at least 2 business ahead of the procedure. Follow-up care is a key part of your treatment and safety. Be sure to make and go to all appointments, and call your doctor if you are having problems. It's also a good idea to know your test results and keep a list of the medicines you take. When should you call for help? Call 911 anytime you think you may need emergency care. For example, call if:    · You passed out (lost consciousness). · You have severe trouble breathing. · You have sudden chest pain and shortness of breath, or you cough up blood. Call your doctor now or seek immediate medical care if:    1. You have signs of infection, such as:  ? Increased pain, swelling, warmth, or redness. ? Red streaks leading from the incision. ? Pus draining from the incision. ? A fever. 2. You have signs of a blood clot, such as:  ? Pain in your calf, back of the knee, thigh, or groin. ? Redness and swelling in your leg or groin. · Your incision comes open and begins to bleed, or the bleeding increases. · You have pain that does not get better after you take pain medicine. Watch closely for changes in your health, and be sure to contact your doctor if:    · You do not have a bowel movement after taking a laxative. Where can you learn more? Go to http://tiffanie-elliot.info/  Enter T054 in the search box to learn more about \"Total Knee Replacement: What to Expect at Home. \"  Current as of: July 1, 2021               Content Version: 13.2  © 3792-2561 Healthwise, [employer]. Care instructions adapted under license by [employer] (which disclaims liability or warranty for this information). If you have questions about a medical condition or this instruction, always ask your healthcare professional. [address] any warranty or liability for your use of this information.             Sincerely,      SUNNY Medina

## 2022-10-03 ENCOUNTER — TELEPHONE (OUTPATIENT)
Dept: ORTHOPEDIC SURGERY | Age: 67
End: 2022-10-03

## 2022-10-03 DIAGNOSIS — Z96.651 STATUS POST RIGHT KNEE REPLACEMENT: ICD-10-CM

## 2022-10-03 RX ORDER — OXYCODONE AND ACETAMINOPHEN 5; 325 MG/1; MG/1
1 TABLET ORAL
Qty: 30 TABLET | Refills: 0 | Status: SHIPPED | OUTPATIENT
Start: 2022-10-03 | End: 2022-10-17 | Stop reason: SDUPTHER

## 2022-10-17 ENCOUNTER — VIRTUAL VISIT (OUTPATIENT)
Dept: ORTHOPEDIC SURGERY | Age: 67
End: 2022-10-17
Payer: MEDICARE

## 2022-10-17 DIAGNOSIS — Z96.651 STATUS POST RIGHT KNEE REPLACEMENT: ICD-10-CM

## 2022-10-17 PROCEDURE — 99024 POSTOP FOLLOW-UP VISIT: CPT | Performed by: NURSE PRACTITIONER

## 2022-10-17 RX ORDER — OXYCODONE AND ACETAMINOPHEN 5; 325 MG/1; MG/1
1 TABLET ORAL
Qty: 30 TABLET | Refills: 0 | Status: SHIPPED | OUTPATIENT
Start: 2022-10-17 | End: 2022-10-27

## 2022-10-17 NOTE — PROGRESS NOTES
Subjective:      Patient presents for postop care following right TKA. Surgery was on 9/14/2022. Ambulating independently. Pain is controlled with current analgesics. Medication(s) being used: Percocet. Reports having twisted his knee and fallen at home a few night ago. Objective: There were no vitals taken for this visit. General:  alert, cooperative, no distress, appears stated age   ROM: 0/100; extensor mechanism intact on SLR   Incision:   no erythema, well-healed, mild swelling     Assessment:     Doing well postoperatively. Recent fall    Plan:     1. Continue PT. 2. May discontinue DVT prophylaxis and begin daily NSAID's for pain/swelling. 3. Follow up at 1 yr with Dr. Akshat Dove for xray's of the right knee and as needed. Koffi Nuñez, who was evaluated through a synchronous (real-time) audio-video encounter, and/or his healthcare decision maker, is aware that it is a billable service, with coverage as determined by his insurance carrier. He provided verbal consent to proceed: Yes, and patient identification was verified. It was conducted pursuant to the emergency declaration under the [facility], [address] [place] [address] authority and the [employer] and [employer] General Act. A caregiver was present when appropriate. Ability to conduct physical exam was limited. I was in the office. The patient was at home.

## 2022-11-03 ENCOUNTER — TELEPHONE (OUTPATIENT)
Dept: ORTHOPEDIC SURGERY | Age: 67
End: 2022-11-03

## 2022-11-03 DIAGNOSIS — Z96.651 STATUS POST RIGHT KNEE REPLACEMENT: Primary | ICD-10-CM

## 2022-11-03 RX ORDER — OXYCODONE AND ACETAMINOPHEN 5; 325 MG/1; MG/1
1 TABLET ORAL
Qty: 30 TABLET | Refills: 0 | Status: SHIPPED | OUTPATIENT
Start: 2022-11-03 | End: 2022-11-11

## 2022-11-03 NOTE — TELEPHONE ENCOUNTER
Pt is requesting if he can get one last refill on his pain mediation.      Pharmacy-- [address]

## (undated) DEVICE — KERLIX BANDAGE ROLL: Brand: KERLIX

## (undated) DEVICE — GLOVE SURG SZ 7 L12IN FNGR THK79MIL GRN LTX FREE

## (undated) DEVICE — BLADE SAW W12.5XL70MM THK1MM RECIP DBL SIDE OFFSET

## (undated) DEVICE — 450 ML BOTTLE OF 0.05% CHLORHEXIDINE GLUCONATE IN 99.95% STERILE WATER FOR IRRIGATION, USP AND APPLICATOR.: Brand: IRRISEPT ANTIMICROBIAL WOUND LAVAGE

## (undated) DEVICE — DRESSING ANTIMIC FOAM OPTIFOAM POSTOP ADH 4X14 IN

## (undated) DEVICE — HOOD WITH PEEL AWAY FACE SHIELD: Brand: T7PLUS

## (undated) DEVICE — GARMENT COMPR M FOR 13IN FT INTMIT SGL BLDR HEM FORC II

## (undated) DEVICE — POWDER SURG CELLERATE RX 1 GM HYDROL COLLEGEN

## (undated) DEVICE — GLOVE ORANGE PI 8 1/2   MSG9085

## (undated) DEVICE — TRAY PREP DRY W/ PREM GLV 2 APPL 6 SPNG 2 UNDPD 1 OVERWRAP

## (undated) DEVICE — BOWL MIXING VAC PALABOWL PALACOS

## (undated) DEVICE — SKIN CLOS DERMABND PRINEO 60CM -- DERMABOUND PRINEO

## (undated) DEVICE — SUTURE VCRL SZ 3-0 L27IN ABSRB UD L24MM PS-1 3/8 CIR PRIM J936H

## (undated) DEVICE — GOWN,AURORA,FABRIC-REINFORCED,X-LARGE: Brand: MEDLINE

## (undated) DEVICE — KIT PROC EXTRM HND FT CUST LF --

## (undated) DEVICE — ZIMMER® STERILE DISPOSABLE TOURNIQUET CUFF WITH PLC, DUAL PORT, SINGLE BLADDER, 34 IN. (86 CM)

## (undated) DEVICE — TIBURON SPLIT SHEET: Brand: CONVERTORS

## (undated) DEVICE — X-RAY DETECTABLE SPONGES,12 PLY: Brand: VISTEC

## (undated) DEVICE — SHEET,DRAPE,70X100,STERILE: Brand: MEDLINE

## (undated) DEVICE — TOTAL KNEE PACK: Brand: MEDLINE INDUSTRIES, INC.

## (undated) DEVICE — GOWN,PRECEPT,XLNG/XXLARGE,STRL: Brand: MEDLINE

## (undated) DEVICE — DRAPE,TOP,102X53,STERILE: Brand: MEDLINE

## (undated) DEVICE — 4-PORT MANIFOLD: Brand: NEPTUNE 2

## (undated) DEVICE — GLOVE SURG SZ 75 L12IN FNGR THK79MIL GRN LTX FREE

## (undated) DEVICE — WRAP KNEE 4 GEL PK ELASTIC STRP PLR CARE

## (undated) DEVICE — DRSG VAC ASST CLSR GRNUFM SM --

## (undated) DEVICE — (D)HANDPIECE IRR W/HI FLO TIP -- DUPLICATE USE ITEM 121586

## (undated) DEVICE — CURITY IDOFORM PACKING STRIP: Brand: CURITY

## (undated) DEVICE — DERMACEA GAUZE ROLL: Brand: DERMACEA

## (undated) DEVICE — GLOVE SURG SZ 65 THK91MIL LTX FREE SYN POLYISOPRENE

## (undated) DEVICE — BNDG,ELSTC,MATRIX,STRL,6"X5YD,LF,HOOK&LP: Brand: MEDLINE

## (undated) DEVICE — STRYKER PERFORMANCE SERIES SAGITTAL BLADE: Brand: STRYKER PERFORMANCE SERIES

## (undated) DEVICE — CANISTER VAC 500ML TBNG RESVR FOR INFOVAC W/O GEL CLMP CONN

## (undated) DEVICE — SPONGE LAP W18XL18IN WHT COT 4 PLY FLD STRUNG RADPQ DISP ST

## (undated) DEVICE — TOWEL,OR,DSP,ST,BLUE,STD,4/PK,20PK/CS: Brand: MEDLINE

## (undated) DEVICE — SUTURE VCRL + SZ 0 L27IN ANTIBACTERIAL POLYGLACTIN 910 W VCP280H

## (undated) DEVICE — BANDAGE COBAN 4 IN COMPR W4INXL5YD FOAM COHESIVE QUIK STK SELF ADH SFT

## (undated) DEVICE — OCCLUSIVE GAUZE STRIP,3% BISMUTH TRIBROMOPHENATE IN PETROLATUM BLEND: Brand: XEROFORM

## (undated) DEVICE — (D)GLOVE SURG TRIFLX 8 PWD LTX -- DISC BY MFR USE ITEM 302994

## (undated) DEVICE — ATTUNE SOLO PINNING SYSTEM

## (undated) DEVICE — 3M™ TEGADERM™ HP TRANSPARENT FILM DRESSING FRAME STYLE, 9546HP, 4 IN X 4-1/2 IN (10 CM X 11.5 CM), 50/CT 4CT/CASE: Brand: 3M™ TEGADERM™

## (undated) DEVICE — SUTURE VCRL + SZ 1 L27IN ANTIBACTERIAL POLYGLACTIN 910 W VCP281H

## (undated) DEVICE — CULTURETTE SGL EVAC TUBE PALL -- 100/CA

## (undated) DEVICE — POWDER HEMOSTAT GEL 3.0GR -- SURGICEL

## (undated) DEVICE — (D)GLOVE SURG TRIFLX 8.5 PWD L -- DISC BY MFR USE ITEM 302995

## (undated) DEVICE — TUBE PORT-A-CUL SWAB 11ML ST

## (undated) DEVICE — COVER,TABLE,HEAVY DUTY,77"X90",STRL: Brand: MEDLINE

## (undated) DEVICE — GLOVE SURG 7 BIOGEL PI ULTRATOUCH G

## (undated) DEVICE — SUT VCRL + 2-0 27IN CT2 UD -- 36/BX

## (undated) DEVICE — GOWN,PRECEPT, XLNG/XLRG ,STRL: Brand: MEDLINE

## (undated) DEVICE — GLOVE SURG SZ 65 L12IN FNGR THK79MIL GRN LTX FREE

## (undated) DEVICE — BLADE ELECTRODE: Brand: VALLEYLAB

## (undated) DEVICE — PREP SKN CHLRAPRP APL 26ML STR --

## (undated) DEVICE — STOCKINETTE IMPERV REG 9X48IN --

## (undated) DEVICE — SKIN MARKER,REGULAR TIP WITH RULER AND LABELS: Brand: DEVON

## (undated) DEVICE — MEDI-VAC SUCTION HANDLE REGULAR CAPACITY: Brand: CARDINAL HEALTH

## (undated) DEVICE — GLOVE SURG UNDERGLOVE 7.5 PF BLU BIOGEL PI MIC LF

## (undated) DEVICE — INTENDED FOR TISSUE SEPARATION, AND OTHER PROCEDURES THAT REQUIRE A SHARP SURGICAL BLADE TO PUNCTURE OR CUT.: Brand: BARD-PARKER SAFETY BLADES SIZE 10, STERILE

## (undated) DEVICE — HYPODERMIC SAFETY NEEDLE: Brand: MAGELLAN

## (undated) DEVICE — ELASTIC BANDAGE: Brand: DEROYAL

## (undated) DEVICE — SPONGE LAP 18X18IN STRL -- 5/PK

## (undated) DEVICE — GLOVE ORANGE PI 8   MSG9080

## (undated) DEVICE — 3M™ IOBAN™ 2 ANTIMICROBIAL INCISE DRAPE 6650EZ: Brand: IOBAN™ 2

## (undated) DEVICE — SOLUTION IV 1000ML 0.9% SOD CHL

## (undated) DEVICE — SOL IRR NACL 0.9% 500ML POUR --

## (undated) DEVICE — GLOVE ORANGE PI 7   MSG9070